# Patient Record
Sex: FEMALE | Race: WHITE | NOT HISPANIC OR LATINO | URBAN - METROPOLITAN AREA
[De-identification: names, ages, dates, MRNs, and addresses within clinical notes are randomized per-mention and may not be internally consistent; named-entity substitution may affect disease eponyms.]

---

## 2022-04-10 ENCOUNTER — HISTORICAL (OUTPATIENT)
Dept: ADMINISTRATIVE | Facility: HOSPITAL | Age: 52
End: 2022-04-10

## 2022-04-28 VITALS
DIASTOLIC BLOOD PRESSURE: 106 MMHG | WEIGHT: 158 LBS | SYSTOLIC BLOOD PRESSURE: 146 MMHG | BODY MASS INDEX: 29.83 KG/M2 | HEIGHT: 61 IN

## 2023-01-29 ENCOUNTER — HOSPITAL ENCOUNTER (INPATIENT)
Facility: HOSPITAL | Age: 53
LOS: 9 days | Discharge: HOME | DRG: 643 | End: 2023-02-07
Attending: SPECIALIST | Admitting: STUDENT IN AN ORGANIZED HEALTH CARE EDUCATION/TRAINING PROGRAM
Payer: MEDICAID

## 2023-01-29 DIAGNOSIS — I10 ACCELERATED HYPERTENSION: Primary | ICD-10-CM

## 2023-01-29 DIAGNOSIS — R07.9 CHEST PAIN: ICD-10-CM

## 2023-01-29 DIAGNOSIS — E03.9 HYPOTHYROIDISM, UNSPECIFIED TYPE: ICD-10-CM

## 2023-01-29 DIAGNOSIS — N28.9 ACUTE KIDNEY INSUFFICIENCY: ICD-10-CM

## 2023-01-29 DIAGNOSIS — L03.116 CELLULITIS OF LEFT FOOT: ICD-10-CM

## 2023-01-29 LAB
ALBUMIN SERPL-MCNC: 3.5 G/DL (ref 3.5–5)
ALBUMIN/GLOB SERPL: 0.8 RATIO (ref 1.1–2)
ALP SERPL-CCNC: 105 UNIT/L (ref 40–150)
ALT SERPL-CCNC: 36 UNIT/L (ref 0–55)
AST SERPL-CCNC: 31 UNIT/L (ref 5–34)
BASOPHILS # BLD AUTO: 0.03 X10(3)/MCL (ref 0–0.2)
BASOPHILS NFR BLD AUTO: 0.3 %
BILIRUBIN DIRECT+TOT PNL SERPL-MCNC: 0.5 MG/DL
BUN SERPL-MCNC: 24.7 MG/DL (ref 9.8–20.1)
CALCIUM SERPL-MCNC: 9.1 MG/DL (ref 8.4–10.2)
CHLORIDE SERPL-SCNC: 103 MMOL/L (ref 98–107)
CO2 SERPL-SCNC: 25 MMOL/L (ref 22–29)
CREAT SERPL-MCNC: 1.98 MG/DL (ref 0.55–1.02)
EOSINOPHIL # BLD AUTO: 0.45 X10(3)/MCL (ref 0–0.9)
EOSINOPHIL NFR BLD AUTO: 4.3 %
ERYTHROCYTE [DISTWIDTH] IN BLOOD BY AUTOMATED COUNT: 16.1 % (ref 11.5–17)
GFR SERPLBLD CREATININE-BSD FMLA CKD-EPI: 30 MLS/MIN/1.73/M2
GLOBULIN SER-MCNC: 4.2 GM/DL (ref 2.4–3.5)
GLUCOSE SERPL-MCNC: 131 MG/DL (ref 74–100)
HCT VFR BLD AUTO: 39.3 % (ref 37–47)
HGB BLD-MCNC: 11.8 GM/DL (ref 12–16)
IMM GRANULOCYTES # BLD AUTO: 0.09 X10(3)/MCL (ref 0–0.04)
IMM GRANULOCYTES NFR BLD AUTO: 0.9 %
LYMPHOCYTES # BLD AUTO: 2.18 X10(3)/MCL (ref 0.6–4.6)
LYMPHOCYTES NFR BLD AUTO: 21 %
MCH RBC QN AUTO: 25.8 PG
MCHC RBC AUTO-ENTMCNC: 30 MG/DL (ref 33–36)
MCV RBC AUTO: 86 FL (ref 80–94)
MONOCYTES # BLD AUTO: 0.59 X10(3)/MCL (ref 0.1–1.3)
MONOCYTES NFR BLD AUTO: 5.7 %
NEUTROPHILS # BLD AUTO: 7.06 X10(3)/MCL (ref 2.1–9.2)
NEUTROPHILS NFR BLD AUTO: 67.8 %
PLATELET # BLD AUTO: 324 X10(3)/MCL (ref 130–400)
PMV BLD AUTO: 9.6 FL (ref 7.4–10.4)
POTASSIUM SERPL-SCNC: 3.2 MMOL/L (ref 3.5–5.1)
PROT SERPL-MCNC: 7.7 GM/DL (ref 6.4–8.3)
RBC # BLD AUTO: 4.57 X10(6)/MCL (ref 4.2–5.4)
SODIUM SERPL-SCNC: 141 MMOL/L (ref 136–145)
T4 FREE SERPL-MCNC: <0.42 NG/DL (ref 0.7–1.48)
TSH SERPL-ACNC: 138.49 UIU/ML (ref 0.35–4.94)
WBC # SPEC AUTO: 10.4 X10(3)/MCL (ref 4.5–11.5)

## 2023-01-29 PROCEDURE — 25000003 PHARM REV CODE 250: Performed by: SPECIALIST

## 2023-01-29 PROCEDURE — 96375 TX/PRO/DX INJ NEW DRUG ADDON: CPT

## 2023-01-29 PROCEDURE — 63600175 PHARM REV CODE 636 W HCPCS: Performed by: SPECIALIST

## 2023-01-29 PROCEDURE — 82533 TOTAL CORTISOL: CPT | Performed by: SPECIALIST

## 2023-01-29 PROCEDURE — 96365 THER/PROPH/DIAG IV INF INIT: CPT

## 2023-01-29 PROCEDURE — 80053 COMPREHEN METABOLIC PANEL: CPT | Performed by: SPECIALIST

## 2023-01-29 PROCEDURE — 85025 COMPLETE CBC W/AUTO DIFF WBC: CPT | Performed by: SPECIALIST

## 2023-01-29 PROCEDURE — 84443 ASSAY THYROID STIM HORMONE: CPT | Performed by: SPECIALIST

## 2023-01-29 PROCEDURE — 84481 FREE ASSAY (FT-3): CPT | Performed by: SPECIALIST

## 2023-01-29 PROCEDURE — 12000002 HC ACUTE/MED SURGE SEMI-PRIVATE ROOM

## 2023-01-29 PROCEDURE — 96376 TX/PRO/DX INJ SAME DRUG ADON: CPT

## 2023-01-29 PROCEDURE — 84439 ASSAY OF FREE THYROXINE: CPT | Performed by: SPECIALIST

## 2023-01-29 PROCEDURE — G0378 HOSPITAL OBSERVATION PER HR: HCPCS

## 2023-01-29 PROCEDURE — 99285 EMERGENCY DEPT VISIT HI MDM: CPT | Mod: 25

## 2023-01-29 RX ORDER — ALPRAZOLAM 0.5 MG/1
0.5 TABLET ORAL
Status: COMPLETED | OUTPATIENT
Start: 2023-01-29 | End: 2023-01-29

## 2023-01-29 RX ORDER — CEFAZOLIN SODIUM 1 G/3ML
1 INJECTION, POWDER, FOR SOLUTION INTRAMUSCULAR; INTRAVENOUS
Status: DISCONTINUED | OUTPATIENT
Start: 2023-01-29 | End: 2023-01-29

## 2023-01-29 RX ORDER — CEFAZOLIN SODIUM 1 G/3ML
1 INJECTION, POWDER, FOR SOLUTION INTRAMUSCULAR; INTRAVENOUS
Status: DISCONTINUED | OUTPATIENT
Start: 2023-01-30 | End: 2023-01-31

## 2023-01-29 RX ORDER — HYDRALAZINE HYDROCHLORIDE 20 MG/ML
20 INJECTION INTRAMUSCULAR; INTRAVENOUS
Status: COMPLETED | OUTPATIENT
Start: 2023-01-29 | End: 2023-01-29

## 2023-01-29 RX ORDER — CLONIDINE HYDROCHLORIDE 0.1 MG/1
0.1 TABLET ORAL
Status: DISCONTINUED | OUTPATIENT
Start: 2023-01-30 | End: 2023-01-30

## 2023-01-29 RX ORDER — LISINOPRIL AND HYDROCHLOROTHIAZIDE 12.5; 2 MG/1; MG/1
1 TABLET ORAL DAILY
Status: DISCONTINUED | OUTPATIENT
Start: 2023-01-30 | End: 2023-01-30

## 2023-01-29 RX ORDER — TALC
6 POWDER (GRAM) TOPICAL NIGHTLY PRN
Status: DISCONTINUED | OUTPATIENT
Start: 2023-01-30 | End: 2023-01-30

## 2023-01-29 RX ORDER — SODIUM CHLORIDE 0.9 % (FLUSH) 0.9 %
2 SYRINGE (ML) INJECTION EVERY 6 HOURS PRN
Status: DISCONTINUED | OUTPATIENT
Start: 2023-01-30 | End: 2023-01-30

## 2023-01-29 RX ORDER — ONDANSETRON 4 MG/1
8 TABLET, ORALLY DISINTEGRATING ORAL EVERY 8 HOURS PRN
Status: DISCONTINUED | OUTPATIENT
Start: 2023-01-30 | End: 2023-01-30

## 2023-01-29 RX ORDER — FAMOTIDINE 20 MG/1
20 TABLET, FILM COATED ORAL DAILY
Status: DISCONTINUED | OUTPATIENT
Start: 2023-01-30 | End: 2023-02-07 | Stop reason: HOSPADM

## 2023-01-29 RX ORDER — ACETAMINOPHEN 325 MG/1
650 TABLET ORAL EVERY 8 HOURS PRN
Status: DISCONTINUED | OUTPATIENT
Start: 2023-01-30 | End: 2023-01-30

## 2023-01-29 RX ORDER — HYDRALAZINE HYDROCHLORIDE 20 MG/ML
10 INJECTION INTRAMUSCULAR; INTRAVENOUS
Status: COMPLETED | OUTPATIENT
Start: 2023-01-29 | End: 2023-01-29

## 2023-01-29 RX ORDER — CLONIDINE HYDROCHLORIDE 0.2 MG/1
0.2 TABLET ORAL
Status: COMPLETED | OUTPATIENT
Start: 2023-01-29 | End: 2023-01-29

## 2023-01-29 RX ADMIN — SODIUM CHLORIDE 1000 ML: 9 INJECTION, SOLUTION INTRAVENOUS at 11:01

## 2023-01-29 RX ADMIN — CLONIDINE HYDROCHLORIDE 0.2 MG: 0.2 TABLET ORAL at 09:01

## 2023-01-29 RX ADMIN — DEXTROSE MONOHYDRATE 1 G: 50 INJECTION, SOLUTION INTRAVENOUS at 08:01

## 2023-01-29 RX ADMIN — ALPRAZOLAM 0.5 MG: 0.5 TABLET ORAL at 10:01

## 2023-01-29 RX ADMIN — HYDRALAZINE HYDROCHLORIDE 10 MG: 20 INJECTION, SOLUTION INTRAMUSCULAR; INTRAVENOUS at 09:01

## 2023-01-29 RX ADMIN — HYDRALAZINE HYDROCHLORIDE 20 MG: 20 INJECTION, SOLUTION INTRAMUSCULAR; INTRAVENOUS at 08:01

## 2023-01-30 PROBLEM — I10 ESSENTIAL HYPERTENSION: Status: ACTIVE | Noted: 2023-01-30

## 2023-01-30 PROBLEM — E03.9 HYPOTHYROIDISM: Status: ACTIVE | Noted: 2023-01-30

## 2023-01-30 PROBLEM — N28.9 ACUTE KIDNEY INSUFFICIENCY: Status: ACTIVE | Noted: 2023-01-30

## 2023-01-30 PROBLEM — I63.9 STROKE: Status: ACTIVE | Noted: 2023-01-30

## 2023-01-30 PROBLEM — R41.82 AMS (ALTERED MENTAL STATUS): Status: ACTIVE | Noted: 2023-01-30

## 2023-01-30 PROBLEM — L03.116 CELLULITIS OF LEFT FOOT: Status: ACTIVE | Noted: 2023-01-30

## 2023-01-30 LAB
AMPHET UR QL SCN: POSITIVE
ANION GAP SERPL CALC-SCNC: 11 MEQ/L
BARBITURATE SCN PRESENT UR: NEGATIVE
BASOPHILS # BLD AUTO: 0.03 X10(3)/MCL (ref 0–0.2)
BASOPHILS NFR BLD AUTO: 0.3 %
BENZODIAZ UR QL SCN: NEGATIVE
BUN SERPL-MCNC: 21.5 MG/DL (ref 9.8–20.1)
CALCIUM SERPL-MCNC: 8.5 MG/DL (ref 8.4–10.2)
CANNABINOIDS UR QL SCN: NEGATIVE
CHLORIDE SERPL-SCNC: 106 MMOL/L (ref 98–107)
CO2 SERPL-SCNC: 25 MMOL/L (ref 22–29)
COCAINE UR QL SCN: NEGATIVE
CORTIS SERPL-SCNC: 13.9 UG/DL
CREAT SERPL-MCNC: 1.46 MG/DL (ref 0.55–1.02)
CREAT/UREA NIT SERPL: 15
EOSINOPHIL # BLD AUTO: 0.29 X10(3)/MCL (ref 0–0.9)
EOSINOPHIL NFR BLD AUTO: 2.6 %
ERYTHROCYTE [DISTWIDTH] IN BLOOD BY AUTOMATED COUNT: 16.3 % (ref 11.5–17)
FOLATE SERPL-MCNC: 5.2 NG/ML (ref 7–31.4)
GFR SERPLBLD CREATININE-BSD FMLA CKD-EPI: 43 MLS/MIN/1.73/M2
GLUCOSE SERPL-MCNC: 126 MG/DL (ref 74–100)
HCT VFR BLD AUTO: 36.6 % (ref 37–47)
HGB BLD-MCNC: 11.3 GM/DL (ref 12–16)
IMM GRANULOCYTES # BLD AUTO: 0.07 X10(3)/MCL (ref 0–0.04)
IMM GRANULOCYTES NFR BLD AUTO: 0.6 %
LYMPHOCYTES # BLD AUTO: 2.13 X10(3)/MCL (ref 0.6–4.6)
LYMPHOCYTES NFR BLD AUTO: 19.4 %
MAGNESIUM SERPL-MCNC: 1.8 MG/DL (ref 1.6–2.6)
MCH RBC QN AUTO: 26.3 PG
MCHC RBC AUTO-ENTMCNC: 30.9 MG/DL (ref 33–36)
MCV RBC AUTO: 85.1 FL (ref 80–94)
MONOCYTES # BLD AUTO: 0.59 X10(3)/MCL (ref 0.1–1.3)
MONOCYTES NFR BLD AUTO: 5.4 %
NEUTROPHILS # BLD AUTO: 7.88 X10(3)/MCL (ref 2.1–9.2)
NEUTROPHILS NFR BLD AUTO: 71.7 %
OPIATES UR QL SCN: NEGATIVE
PCP UR QL: NEGATIVE
PH UR: 7 [PH] (ref 3–11)
PLATELET # BLD AUTO: 315 X10(3)/MCL (ref 130–400)
PMV BLD AUTO: 9.9 FL (ref 7.4–10.4)
POTASSIUM SERPL-SCNC: 3.3 MMOL/L (ref 3.5–5.1)
RBC # BLD AUTO: 4.3 X10(6)/MCL (ref 4.2–5.4)
SODIUM SERPL-SCNC: 142 MMOL/L (ref 136–145)
SPECIFIC GRAVITY, URINE AUTO (.000) (OHS): 1.02 (ref 1–1.03)
T3FREE SERPL-MCNC: 1.35 PG/ML (ref 1.57–3.91)
VIT B12 SERPL-MCNC: 226 PG/ML (ref 213–816)
WBC # SPEC AUTO: 11 X10(3)/MCL (ref 4.5–11.5)

## 2023-01-30 PROCEDURE — 80048 BASIC METABOLIC PNL TOTAL CA: CPT | Performed by: STUDENT IN AN ORGANIZED HEALTH CARE EDUCATION/TRAINING PROGRAM

## 2023-01-30 PROCEDURE — 83735 ASSAY OF MAGNESIUM: CPT | Performed by: STUDENT IN AN ORGANIZED HEALTH CARE EDUCATION/TRAINING PROGRAM

## 2023-01-30 PROCEDURE — 25000003 PHARM REV CODE 250: Performed by: SPECIALIST

## 2023-01-30 PROCEDURE — 85025 COMPLETE CBC W/AUTO DIFF WBC: CPT | Performed by: STUDENT IN AN ORGANIZED HEALTH CARE EDUCATION/TRAINING PROGRAM

## 2023-01-30 PROCEDURE — 82607 VITAMIN B-12: CPT | Performed by: STUDENT IN AN ORGANIZED HEALTH CARE EDUCATION/TRAINING PROGRAM

## 2023-01-30 PROCEDURE — G0378 HOSPITAL OBSERVATION PER HR: HCPCS

## 2023-01-30 PROCEDURE — 87389 HIV-1 AG W/HIV-1&-2 AB AG IA: CPT | Performed by: STUDENT IN AN ORGANIZED HEALTH CARE EDUCATION/TRAINING PROGRAM

## 2023-01-30 PROCEDURE — 63600175 PHARM REV CODE 636 W HCPCS: Performed by: SPECIALIST

## 2023-01-30 PROCEDURE — 86780 TREPONEMA PALLIDUM: CPT | Performed by: STUDENT IN AN ORGANIZED HEALTH CARE EDUCATION/TRAINING PROGRAM

## 2023-01-30 PROCEDURE — 92523 SPEECH SOUND LANG COMPREHEN: CPT

## 2023-01-30 PROCEDURE — 82746 ASSAY OF FOLIC ACID SERUM: CPT | Performed by: STUDENT IN AN ORGANIZED HEALTH CARE EDUCATION/TRAINING PROGRAM

## 2023-01-30 PROCEDURE — 97162 PT EVAL MOD COMPLEX 30 MIN: CPT

## 2023-01-30 PROCEDURE — 63600175 PHARM REV CODE 636 W HCPCS: Performed by: STUDENT IN AN ORGANIZED HEALTH CARE EDUCATION/TRAINING PROGRAM

## 2023-01-30 PROCEDURE — 80307 DRUG TEST PRSMV CHEM ANLYZR: CPT | Performed by: STUDENT IN AN ORGANIZED HEALTH CARE EDUCATION/TRAINING PROGRAM

## 2023-01-30 PROCEDURE — 25000003 PHARM REV CODE 250: Performed by: STUDENT IN AN ORGANIZED HEALTH CARE EDUCATION/TRAINING PROGRAM

## 2023-01-30 PROCEDURE — 97166 OT EVAL MOD COMPLEX 45 MIN: CPT

## 2023-01-30 PROCEDURE — 11000001 HC ACUTE MED/SURG PRIVATE ROOM

## 2023-01-30 PROCEDURE — 96372 THER/PROPH/DIAG INJ SC/IM: CPT | Performed by: STUDENT IN AN ORGANIZED HEALTH CARE EDUCATION/TRAINING PROGRAM

## 2023-01-30 PROCEDURE — A4216 STERILE WATER/SALINE, 10 ML: HCPCS | Performed by: SPECIALIST

## 2023-01-30 PROCEDURE — A4216 STERILE WATER/SALINE, 10 ML: HCPCS | Performed by: STUDENT IN AN ORGANIZED HEALTH CARE EDUCATION/TRAINING PROGRAM

## 2023-01-30 PROCEDURE — 94760 N-INVAS EAR/PLS OXIMETRY 1: CPT

## 2023-01-30 RX ORDER — HYDROCHLOROTHIAZIDE 12.5 MG/1
12.5 TABLET ORAL DAILY
Status: DISCONTINUED | OUTPATIENT
Start: 2023-01-30 | End: 2023-02-01

## 2023-01-30 RX ORDER — ENOXAPARIN SODIUM 100 MG/ML
40 INJECTION SUBCUTANEOUS EVERY 24 HOURS
Status: DISCONTINUED | OUTPATIENT
Start: 2023-01-30 | End: 2023-02-07 | Stop reason: HOSPADM

## 2023-01-30 RX ORDER — ONDANSETRON 2 MG/ML
4 INJECTION INTRAMUSCULAR; INTRAVENOUS EVERY 8 HOURS PRN
Status: DISCONTINUED | OUTPATIENT
Start: 2023-01-30 | End: 2023-02-07 | Stop reason: HOSPADM

## 2023-01-30 RX ORDER — ATORVASTATIN CALCIUM 40 MG/1
40 TABLET, FILM COATED ORAL NIGHTLY
Status: DISCONTINUED | OUTPATIENT
Start: 2023-01-30 | End: 2023-02-07 | Stop reason: HOSPADM

## 2023-01-30 RX ORDER — MAGNESIUM SULFATE HEPTAHYDRATE 40 MG/ML
2 INJECTION, SOLUTION INTRAVENOUS ONCE
Status: COMPLETED | OUTPATIENT
Start: 2023-01-30 | End: 2023-01-30

## 2023-01-30 RX ORDER — NALOXONE HCL 0.4 MG/ML
0.02 VIAL (ML) INJECTION
Status: DISCONTINUED | OUTPATIENT
Start: 2023-01-30 | End: 2023-02-07 | Stop reason: HOSPADM

## 2023-01-30 RX ORDER — IPRATROPIUM BROMIDE AND ALBUTEROL SULFATE 2.5; .5 MG/3ML; MG/3ML
3 SOLUTION RESPIRATORY (INHALATION) EVERY 6 HOURS PRN
Status: DISCONTINUED | OUTPATIENT
Start: 2023-01-30 | End: 2023-02-07 | Stop reason: HOSPADM

## 2023-01-30 RX ORDER — POLYETHYLENE GLYCOL 3350 17 G/17G
17 POWDER, FOR SOLUTION ORAL 3 TIMES DAILY PRN
Status: DISCONTINUED | OUTPATIENT
Start: 2023-01-30 | End: 2023-02-07 | Stop reason: HOSPADM

## 2023-01-30 RX ORDER — LABETALOL HCL 20 MG/4 ML
10 SYRINGE (ML) INTRAVENOUS 4 TIMES DAILY PRN
Status: DISCONTINUED | OUTPATIENT
Start: 2023-01-30 | End: 2023-02-07 | Stop reason: HOSPADM

## 2023-01-30 RX ORDER — SIMETHICONE 80 MG
1 TABLET,CHEWABLE ORAL 4 TIMES DAILY PRN
Status: DISCONTINUED | OUTPATIENT
Start: 2023-01-30 | End: 2023-02-07 | Stop reason: HOSPADM

## 2023-01-30 RX ORDER — TALC
9 POWDER (GRAM) TOPICAL NIGHTLY PRN
Status: DISCONTINUED | OUTPATIENT
Start: 2023-01-30 | End: 2023-02-07 | Stop reason: HOSPADM

## 2023-01-30 RX ORDER — SODIUM CHLORIDE 0.9 % (FLUSH) 0.9 %
10 SYRINGE (ML) INJECTION
Status: DISCONTINUED | OUTPATIENT
Start: 2023-01-30 | End: 2023-02-07 | Stop reason: HOSPADM

## 2023-01-30 RX ORDER — ONDANSETRON 4 MG/1
8 TABLET, ORALLY DISINTEGRATING ORAL EVERY 8 HOURS PRN
Status: DISCONTINUED | OUTPATIENT
Start: 2023-01-30 | End: 2023-02-07 | Stop reason: HOSPADM

## 2023-01-30 RX ORDER — ACETAMINOPHEN 325 MG/1
650 TABLET ORAL EVERY 4 HOURS PRN
Status: DISCONTINUED | OUTPATIENT
Start: 2023-01-30 | End: 2023-02-07 | Stop reason: HOSPADM

## 2023-01-30 RX ORDER — MAG HYDROX/ALUMINUM HYD/SIMETH 200-200-20
30 SUSPENSION, ORAL (FINAL DOSE FORM) ORAL 4 TIMES DAILY PRN
Status: DISCONTINUED | OUTPATIENT
Start: 2023-01-30 | End: 2023-02-07 | Stop reason: HOSPADM

## 2023-01-30 RX ORDER — HYDROCODONE BITARTRATE AND ACETAMINOPHEN 5; 325 MG/1; MG/1
1 TABLET ORAL EVERY 6 HOURS PRN
Status: DISCONTINUED | OUTPATIENT
Start: 2023-01-30 | End: 2023-02-07 | Stop reason: HOSPADM

## 2023-01-30 RX ORDER — IBUPROFEN 200 MG
16 TABLET ORAL
Status: DISCONTINUED | OUTPATIENT
Start: 2023-01-30 | End: 2023-02-07 | Stop reason: HOSPADM

## 2023-01-30 RX ORDER — BISACODYL 10 MG
10 SUPPOSITORY, RECTAL RECTAL DAILY PRN
Status: DISCONTINUED | OUTPATIENT
Start: 2023-01-30 | End: 2023-02-07 | Stop reason: HOSPADM

## 2023-01-30 RX ORDER — MORPHINE SULFATE 4 MG/ML
2 INJECTION, SOLUTION INTRAMUSCULAR; INTRAVENOUS EVERY 6 HOURS PRN
Status: DISCONTINUED | OUTPATIENT
Start: 2023-01-30 | End: 2023-02-07 | Stop reason: HOSPADM

## 2023-01-30 RX ORDER — IBUPROFEN 200 MG
24 TABLET ORAL
Status: DISCONTINUED | OUTPATIENT
Start: 2023-01-30 | End: 2023-02-07 | Stop reason: HOSPADM

## 2023-01-30 RX ORDER — LISINOPRIL 10 MG/1
20 TABLET ORAL DAILY
Status: DISCONTINUED | OUTPATIENT
Start: 2023-01-30 | End: 2023-02-07 | Stop reason: HOSPADM

## 2023-01-30 RX ORDER — GLUCAGON 1 MG
1 KIT INJECTION
Status: DISCONTINUED | OUTPATIENT
Start: 2023-01-30 | End: 2023-02-07 | Stop reason: HOSPADM

## 2023-01-30 RX ORDER — ASPIRIN 81 MG/1
81 TABLET ORAL DAILY
Status: DISCONTINUED | OUTPATIENT
Start: 2023-01-31 | End: 2023-02-07 | Stop reason: HOSPADM

## 2023-01-30 RX ADMIN — CEFAZOLIN 1 G: 330 INJECTION, POWDER, FOR SOLUTION INTRAMUSCULAR; INTRAVENOUS at 03:01

## 2023-01-30 RX ADMIN — MAGNESIUM SULFATE HEPTAHYDRATE 2 G: 40 INJECTION, SOLUTION INTRAVENOUS at 11:01

## 2023-01-30 RX ADMIN — ENOXAPARIN SODIUM 40 MG: 40 INJECTION SUBCUTANEOUS at 04:01

## 2023-01-30 RX ADMIN — ATORVASTATIN CALCIUM 40 MG: 40 TABLET, FILM COATED ORAL at 08:01

## 2023-01-30 RX ADMIN — CEFAZOLIN 1 G: 330 INJECTION, POWDER, FOR SOLUTION INTRAMUSCULAR; INTRAVENOUS at 06:01

## 2023-01-30 RX ADMIN — CEFAZOLIN 1 G: 330 INJECTION, POWDER, FOR SOLUTION INTRAMUSCULAR; INTRAVENOUS at 10:01

## 2023-01-30 RX ADMIN — FAMOTIDINE 20 MG: 20 TABLET, FILM COATED ORAL at 08:01

## 2023-01-30 RX ADMIN — Medication 2 ML: at 06:01

## 2023-01-30 RX ADMIN — LABETALOL HYDROCHLORIDE 10 MG: 5 INJECTION, SOLUTION INTRAVENOUS at 08:01

## 2023-01-30 RX ADMIN — LISINOPRIL 20 MG: 10 TABLET ORAL at 08:01

## 2023-01-30 RX ADMIN — LEVOTHYROXINE SODIUM 150 MCG: 0.1 TABLET ORAL at 06:01

## 2023-01-30 RX ADMIN — HYDROCHLOROTHIAZIDE 12.5 MG: 12.5 TABLET ORAL at 08:01

## 2023-01-30 NOTE — ASSESSMENT & PLAN NOTE
-metabolic encephalopathy secondary to profound hypothyroidism   -record review reveals patient has had an inpatient psychiatric admission previously however no notes available  -pending HIV, syphilis, B12, folate

## 2023-01-30 NOTE — ASSESSMENT & PLAN NOTE
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

## 2023-01-30 NOTE — PROGRESS NOTES
Nursing and PT tried to wake pt up, but she continued to fall asleep during eating and conversation. Unable to arouse for participation. PT to return in AM

## 2023-01-30 NOTE — ASSESSMENT & PLAN NOTE
-continue with lisinopril, hydrochlorothiazide  -patient had permissive hypertension for 24 hours, okay to control now

## 2023-01-30 NOTE — PLAN OF CARE
Problem: Adult Inpatient Plan of Care  Goal: Plan of Care Review  Outcome: Ongoing, Progressing  Goal: Patient-Specific Goal (Individualized)  Outcome: Ongoing, Progressing  Flowsheets (Taken 1/30/2023 1658)  Anxieties, Fears or Concerns: being in the hospital  Individualized Care Needs: controlling blood pressure  Goal: Absence of Hospital-Acquired Illness or Injury  Outcome: Ongoing, Progressing  Intervention: Identify and Manage Fall Risk  Flowsheets (Taken 1/30/2023 1658)  Safety Promotion/Fall Prevention:   assistive device/personal item within reach   bed alarm set   nonskid shoes/socks when out of bed   side rails raised x 2  Intervention: Prevent Skin Injury  Flowsheets (Taken 1/30/2023 1658)  Body Position: supine  Skin Protection:   adhesive use limited   incontinence pads utilized   tubing/devices free from skin contact  Intervention: Prevent and Manage VTE (Venous Thromboembolism) Risk  Flowsheets (Taken 1/30/2023 1658)  Activity Management: Rolling - L1  VTE Prevention/Management:   bleeding risk assessed   fluids promoted  Goal: Optimal Comfort and Wellbeing  Outcome: Ongoing, Progressing  Goal: Readiness for Transition of Care  Outcome: Ongoing, Progressing     Problem: Skin Injury Risk Increased  Goal: Skin Health and Integrity  Outcome: Ongoing, Progressing     Problem: Fall Injury Risk  Goal: Absence of Fall and Fall-Related Injury  Outcome: Ongoing, Progressing  Intervention: Identify and Manage Contributors  Flowsheets (Taken 1/30/2023 1658)  Medication Review/Management: medications reviewed     Problem: Infection  Goal: Absence of Infection Signs and Symptoms  Outcome: Ongoing, Progressing  Intervention: Prevent or Manage Infection  Flowsheets (Taken 1/30/2023 1658)  Isolation Precautions: protective     Problem: Fluid and Electrolyte Imbalance (Acute Kidney Injury/Impairment)  Goal: Fluid and Electrolyte Balance  Outcome: Ongoing, Progressing  Intervention: Monitor and Manage Fluid and  Electrolyte Balance  Flowsheets (Taken 1/30/2023 1658)  Fluid/Electrolyte Management: fluids provided     Problem: Oral Intake Inadequate (Acute Kidney Injury/Impairment)  Goal: Optimal Nutrition Intake  Outcome: Ongoing, Progressing  Intervention: Promote and Optimize Nutrition  Flowsheets (Taken 1/30/2023 1658)  Oral Nutrition Promotion: safe use of adaptive equipment encouraged     Problem: Renal Function Impairment (Acute Kidney Injury/Impairment)  Goal: Effective Renal Function  Outcome: Ongoing, Progressing  Intervention: Monitor and Support Renal Function  Flowsheets (Taken 1/30/2023 1658)  Medication Review/Management: medications reviewed

## 2023-01-30 NOTE — PLAN OF CARE
Problem: Occupational Therapy  Goal: Occupational Therapy Goal  Description: Goals to be met by: 2/6/23     Patient will increase functional independence with ADLs by performing:    LE Dressing with Minimal Assistance.  Grooming while standing at sink with Stand-by Assistance.  Toileting from toilet with Minimal Assistance for hygiene and clothing management.   Bathing from  shower chair/bench with Minimal Assistance.  Toilet transfer to toilet with Contact Guard Assistance.  Increased functional strength to 5/5 for ADL.    Outcome: Ongoing, Progressing

## 2023-01-30 NOTE — SUBJECTIVE & OBJECTIVE
History reviewed. No pertinent past medical history.    History reviewed. No pertinent surgical history.    Review of patient's allergies indicates:  No Known Allergies    No current facility-administered medications on file prior to encounter.     No current outpatient medications on file prior to encounter.     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   Objective:     Vital Signs (Most Recent):  Temp: 97.7 °F (36.5 °C) (01/30/23 1138)  Pulse: 79 (01/30/23 1138)  Resp: 18 (01/30/23 0327)  BP: (!) 163/83 (01/30/23 1138)  SpO2: 98 % (01/30/23 1138)   Vital Signs (24h Range):  Temp:  [96.2 °F (35.7 °C)-98.3 °F (36.8 °C)] 97.7 °F (36.5 °C)  Pulse:  [69-92] 79  Resp:  [18-29] 18  SpO2:  [96 %-98 %] 98 %  BP: (103-265)/() 163/83     Weight: 85.5 kg (188 lb 7.9 oz)  Body mass index is 30.42 kg/m².    Physical Exam  Constitutional:       General: She is not in acute distress.     Appearance: Normal appearance. She is obese.   HENT:      Mouth/Throat:      Mouth: Mucous membranes are moist.      Pharynx: Oropharynx is clear. No oropharyngeal exudate or posterior oropharyngeal erythema.   Eyes:      Extraocular Movements: Extraocular movements intact.      Conjunctiva/sclera: Conjunctivae normal.      Pupils: Pupils are equal, round, and reactive to light.   Neck:      Thyroid: No thyromegaly.   Cardiovascular:      Rate and Rhythm: Normal rate and regular rhythm.      Heart sounds: No murmur heard.    No friction rub. No gallop.   Pulmonary:      Breath sounds: Normal breath sounds.   Abdominal:      General: Bowel sounds are normal. There is no distension.      Palpations: Abdomen is soft.      Tenderness: There is no abdominal tenderness.   Lymphadenopathy:       Cervical: No cervical adenopathy.   Skin:     General: Skin is warm.      Findings: No rash.   Neurological:      General: No focal deficit present.      Mental Status: She is oriented to person, place, and time.      Cranial Nerves: No cranial nerve deficit.   Psychiatric:         Mood and Affect: Mood is not anxious or depressed. Affect is not inappropriate.         Speech: She is noncommunicative. Speech is delayed.         Behavior: Behavior is slowed.      Comments: Pseudobulbar affect         CRANIAL NERVES     CN III, IV, VI   Pupils are equal, round, and reactive to light.     Significant Labs: All pertinent labs within the past 24 hours have been reviewed.    Significant Imaging: I have reviewed all pertinent imaging results/findings within the past 24 hours.

## 2023-01-30 NOTE — ED PROVIDER NOTES
Encounter Date: 1/29/2023       History     Chief Complaint   Patient presents with    Foot Swelling     Presents with redness, swelling and pain to L foot that is worsening over the last few months.       Patient presents complaining of redness, swelling and pain to her left foot that has worsened over the last few  months; patient is here with her boyfriend who states she has not been taking her medication for years; patient's blood pressure with significant elevation; she denies chest pain or shortness of breath but her boyfriend notes that she is been acting differently lately with sluggishness and sometimes seems confused    The history is provided by the patient and a friend.   Review of patient's allergies indicates:  No Known Allergies  History reviewed. No pertinent past medical history.  History reviewed. No pertinent surgical history.  History reviewed. No pertinent family history.     Review of Systems   Constitutional:  Positive for fatigue.   HENT: Negative.     Respiratory: Negative.     Cardiovascular: Negative.    Gastrointestinal: Negative.    Musculoskeletal: Negative.    Skin: Negative.    Neurological: Negative.    All other systems reviewed and are negative.    Physical Exam     Initial Vitals [01/29/23 1955]   BP Pulse Resp Temp SpO2   (!) 214/142 89 20 98.3 °F (36.8 °C) 96 %      MAP       --         Physical Exam    Nursing note and vitals reviewed.  Constitutional:   Disheveled, well-developed; patient is oriented to person and place but has trouble stating the date or year and is slow to answer questions   HENT:   Head: Normocephalic and atraumatic.   Eyes: EOM are normal. Pupils are equal, round, and reactive to light.   Neck: Neck supple.   Normal range of motion.  Cardiovascular:  Normal rate, regular rhythm, normal heart sounds and intact distal pulses.           Pulmonary/Chest: Breath sounds normal.   Abdominal: Abdomen is soft. She exhibits no distension.   Protuberant    Musculoskeletal:         General: Normal range of motion.      Cervical back: Normal range of motion and neck supple.      Comments: Swelling and redness to the dorsum of the right foot     Neurological: She is alert. She has normal strength. No cranial nerve deficit. GCS score is 15. GCS eye subscore is 4. GCS verbal subscore is 5. GCS motor subscore is 6.   Skin: Skin is warm and dry.       ED Course   Procedures  Labs Reviewed   COMPREHENSIVE METABOLIC PANEL - Abnormal; Notable for the following components:       Result Value    Potassium Level 3.2 (*)     Glucose Level 131 (*)     Blood Urea Nitrogen 24.7 (*)     Creatinine 1.98 (*)     Globulin 4.2 (*)     Albumin/Globulin Ratio 0.8 (*)     All other components within normal limits   TSH - Abnormal; Notable for the following components:    Thyroid Stimulating Hormone 138.490 (*)     All other components within normal limits   CBC WITH DIFFERENTIAL - Abnormal; Notable for the following components:    Hgb 11.8 (*)     MCHC 30.0 (*)     IG# 0.09 (*)     All other components within normal limits   T4, FREE - Abnormal; Notable for the following components:    Thyroxine Free <0.42 (*)     All other components within normal limits   CBC W/ AUTO DIFFERENTIAL    Narrative:     The following orders were created for panel order CBC auto differential.  Procedure                               Abnormality         Status                     ---------                               -----------         ------                     CBC with Differential[[id number]]        Abnormal            Final result                 Please view results for these tests on the individual orders.   T3,FREE ([facility] ONLY)   CORTISOL, RANDOM          Imaging Results              CT Head Without Contrast (Preliminary result)  Result time 01/29/23 23:54:32      Preliminary result by Ortiz Vazquez MD (01/29/23 23:54:32)                   Narrative:    START OF REPORT:  Technique: CT of the head was  performed without intravenous contrast with axial as well as coronal and sagittal images.    Comparison: None.    Dosage Information: Automated Exposure Control was utilized 1562.06 mGy.cm.    Clinical history: Altered mental status-swelling and pain to foot.    Findings:  Hemorrhage: No acute intracranial hemorrhage is seen.  CSF spaces: The ventricles, sulci and basal cisterns all appear somewhat prominent suggesting an element of global cerebral atrophy.  Brain parenchyma: Pronounced microvascular change is seen in portions of the periventricular and deep white matter tracts.  Vascular territory infarcts:  Lacunar infarcts: There is a â5.7 mmâ lacunar infarct seen on âImage 24, Series 601â in the right. Corona radiata and adjacent capsuloganglionic region.  Cerebellum: Unremarkable.  Sella and skull base: The sella appears to be within normal limits for age.  Intracranial calcifications: Incidental note is made of bilateral choroid plexus calcification. Incidental note is made of some pineal region calcification.  Calvarium: No acute linear or depressed skull fracture is seen.    Maxillofacial Structures:  Paranasal sinuses: The visualized paranasal sinuses appear clear with no significant mucoperiosteal thickening or air fluid levels identified.  Orbits: The orbits appear unremarkable.  Zygomatic arches: The zygomatic arches are intact and unremarkable.  Temporal bones and mastoids: The temporal bones and mastoids appear unremarkable.  TMJ: The mandibular condyles appear normally placed with respect to the mandibular fossa.      Impression:  1. Pronounced microvascular change is seen in portions of the periventricular and deep white matter tracts. Correlate clinically as regards additional evaluation and follow-up possibly with MRI.  2. No acute intracranial process identified. Details and other findings as noted above.                          Preliminary result by Interface, Rad Results In (01/29/23  23:54:32)                   Narrative:    START OF REPORT:  Technique: CT of the head was performed without intravenous contrast with axial as well as coronal and sagittal images.    Comparison: None.    Dosage Information: Automated Exposure Control was utilized 1562.06 mGy.cm.    Clinical history: Altered mental status-swelling and pain to foot.    Findings:  Hemorrhage: No acute intracranial hemorrhage is seen.  CSF spaces: The ventricles, sulci and basal cisterns all appear somewhat prominent suggesting an element of global cerebral atrophy.  Brain parenchyma: Pronounced microvascular change is seen in portions of the periventricular and deep white matter tracts.  Vascular territory infarcts:  Lacunar infarcts: There is a â5.7 mmâ lacunar infarct seen on âImage 24, Series 601â in the right. Corona radiata and adjacent capsuloganglionic region.  Cerebellum: Unremarkable.  Sella and skull base: The sella appears to be within normal limits for age.  Intracranial calcifications: Incidental note is made of bilateral choroid plexus calcification. Incidental note is made of some pineal region calcification.  Calvarium: No acute linear or depressed skull fracture is seen.    Maxillofacial Structures:  Paranasal sinuses: The visualized paranasal sinuses appear clear with no significant mucoperiosteal thickening or air fluid levels identified.  Orbits: The orbits appear unremarkable.  Zygomatic arches: The zygomatic arches are intact and unremarkable.  Temporal bones and mastoids: The temporal bones and mastoids appear unremarkable.  TMJ: The mandibular condyles appear normally placed with respect to the mandibular fossa.      Impression:  1. Pronounced microvascular change is seen in portions of the periventricular and deep white matter tracts. Correlate clinically as regards additional evaluation and follow-up possibly with MRI.  2. No acute intracranial process identified. Details and other findings as noted  above.                                         Medications   sodium chloride 0.9% bolus 1,000 mL 1,000 mL (1,000 mLs Intravenous New Bag 1/29/23 2349)   sodium chloride 0.9% flush 2 mL (has no administration in time range)   melatonin tablet 6 mg (has no administration in time range)   acetaminophen tablet 650 mg (has no administration in time range)   famotidine tablet 20 mg (has no administration in time range)   ondansetron disintegrating tablet 8 mg (has no administration in time range)   ceFAZolin injection 1 g (has no administration in time range)   levothyroxine tablet 150 mcg (has no administration in time range)   cloNIDine tablet 0.1 mg (has no administration in time range)   lisinopriL tablet 20 mg (has no administration in time range)     And   hydroCHLOROthiazide tablet 12.5 mg (has no administration in time range)   hydrALAZINE injection 20 mg (20 mg Intravenous Given 1/29/23 2046)   ceFAZolin (ANCEF) 1 g in dextrose 5 % in water (D5W) 5 % 50 mL IVPB (MB+) (0 g Intravenous Stopped 1/29/23 2134)   hydrALAZINE injection 10 mg (10 mg Intravenous Given 1/29/23 2139)   cloNIDine tablet 0.2 mg (0.2 mg Oral Given 1/29/23 2117)   ALPRAZolam tablet 0.5 mg (0.5 mg Oral Given 1/29/23 2238)     Medical Decision Making:   Initial Assessment:    Patient appears unkempt in somewhat disheveled answering questions slowly; she has a very high blood pressure; she is been noncompliant with her medicatio  Differential Diagnosis:   Anemia; Viral illness; Dehydration; Electrolyte abnormality, cellulitis of foot, hypothyroidism, hyperthyroidism   Clinical Tests:   Lab Tests: Ordered and Reviewed  Radiological Study: Ordered and Reviewed  ED Management:   Patient was given hydralazine 20 mg IV for her blood pressure and Ancef 1 g for the cellulitis in her  right foot; patient had a TSH which was above 100 and I added a T4 level as well as cortisol; also ordered CT of the head due to the confusion; CT of the head revealed  "pronounce microvascular changes and will order MRI for the morning; patient will be started on Synthroid for her hypothyroidism and possibly early signs of myxedema               Patient Vitals for the past 24 hrs:   BP Temp Temp src Pulse Resp SpO2 Height Weight   01/30/23 0018 (!) 173/106 97.7 °F (36.5 °C) Oral 88 18 97 % 5' 6" (1.676 m) 85.5 kg (188 lb 7.9 oz)   01/29/23 2300 (!) 164/74 -- -- 85 -- 96 % -- --   01/29/23 2142 (!) 184/108 -- -- 92 20 97 % -- --   01/29/23 2110 (!) 175/141 -- -- 90 -- -- -- --   01/29/23 2057 (!) 185/145 -- -- 81 (!) 27 -- -- --   01/29/23 2018 (!) 265/154 -- -- 90 -- 97 % -- --   01/29/23 2016 -- -- -- 81 (!) 29 -- -- --   01/29/23 1955 (!) 214/142 98.3 °F (36.8 °C) Oral 89 20 96 % 5' 6" (1.676 m) 68 kg (150 lb)    Discussed admission with Dr. Reyes           Clinical Impression:   Final diagnoses:  [L03.116] Cellulitis of left foot  [E03.9] Hypothyroidism, unspecified type  [N28.9] Acute kidney insufficiency  [I10] Accelerated hypertension (Primary)        ED Disposition Condition    Observation Stable                Kang Martin MD  01/30/23 0109    "

## 2023-01-30 NOTE — ASSESSMENT & PLAN NOTE
-elevated TSH, low T4   -started on levothyroxine 150 mcg   -repeat TSH tomorrow, Q 2 days for 4 days and then subsequently would need repeat TSH in 6 weeks

## 2023-01-30 NOTE — PT/OT/SLP EVAL
Speech Language Pathology Evaluation  Cognitive Communication    Patient Name:  Alysia Washington   MRN:  [id number]  Admitting Diagnosis: Hypothyroidism    Recommendations:     Recommendations:                General Recommendations:  Cognitive-linguistic therapy  Diet recommendations:   , Thin liquids - IDDSI Level 0   Aspiration Precautions: Feed only when awake/alert and HOB to 90 degrees   General Precautions: Standard, fall  Communication strategies:  none    History:     History reviewed. No pertinent past medical history.    History reviewed. No pertinent surgical history.    Social History: Patient lives with boy friend.    Prior Intubation HX:      Modified Barium Swallow:     Chest X-Rays: no recent CXR on file    Prior diet: regular/thin    Occupation/hobbies/homemaking: pt denies driving or working. Pt reports she sometimes cooks and does manage her own medications.    Subjective     Pt sitting up in bed following PT and OT evals. Pt awake, though w/ flat affect and slow processing and initiation. Pt did not answer to first few Qs SLP presented.    Patient goals: none stated     Pain/Comfort:       Respiratory Status: Room air    Objective:   Cognitive Status:    Arousal/Alertness Delayed responses    Attention Sustained attention deficit    Orientation Person, Place, and Situation  Memory Immediate Recall WFL, Delayedpt I'ly recalled 2/3 words following a 3 minute filled delay, and long term recall 50% acc  Problem Solving Sequencing impaired, Solutions 75% acc, and Compare/contrast impaired       Receptive Language:   Comprehension:      Questions Complex yes/no 100% acc  Commands  two step basic commands 100% acc  multistep basic commands 0% acc    Pragmatics:    Abnormal affect flat, monotone  , initiation decreased, and Eye contact decreased    Expressive Language:  Verbal:    WH Qs: 100% acc  Automatic Speech  Phrase completion 100% acc        Motor Speech:  WFL    Voice:   WFL    Visual-Spatial:  Did  not assess    Reading:   Did not assess      Written Expression:   Did not assess    Pt falling asleep towards end of eval and unable to answer additional Qs or continue eval despite frequent verbal cueing provided.  Unable to perform bedside swallow eval this date due to pt falling fast asleep during cognitive linguistic eval.Of note, Pt was observed drinking coca cola via straw across eval w/o overt s/s of aspiration.     Treatment: skilled SLP services are warranted to target cognitive linguistic deficits noted.     Assessment:   Alysia Washington is a 52 y.o. female with an SLP diagnosis of Cognitive-Linguistic Impairment.  She presents with impaired long term memory, orientation, multi step command following, sequencing, initiation, and problem solving. Pt would benefit from skilled SLP services to target above stated deficits and promote a return to PLOF.    Goals:   Multidisciplinary Problems       SLP Goals          Problem: SLP    Goal Priority Disciplines Outcome   SLP Goal     SLP Ongoing, Progressing   Description: LTG: Pt will improve cognitive linguistic skills to SBA level to ensure safety upon discharge home.    STG:  Pt will orient x4 [place].  Pt will attend to structured task for 3 minutes given 3 or less redirections/cues.  Pt will follow 3-step commands w/ 80% acc modA.  Pt will provide solutions to common situations w/ 90% acc [place].  Pt will complete 3-4 step sequencing tasks w/ 80% acc modA.                       Plan:   Patient to be seen:   (PRN)   Plan of Care expires:  02/10/23  Plan of Care reviewed with:   (unable as pt fell asleep towards end of eval.)   SLP Follow-Up:  Yes    Pt would benefit from continuing therapy via acute rehab facility.      Discharge recommendations:      Barriers to Discharge:  Level of Skilled Assistance Needed see PT/Ot notes and Safety Awareness impaired    Time Tracking:   SLP Treatment Date:      Speech Start Time:  0935  Speech Stop Time:  1000     Speech Total  Time (min):  25 min    Billable Minutes: Eval 25 speech/language/cognition     Taty Bella M.S., CCC-SLP   01/30/2023

## 2023-01-30 NOTE — PT/OT/SLP EVAL
"Physical Therapy Acute Care Evaluation    Patient Name:  Alysia Washington   MRN:  [id number]    History:     History reviewed. No pertinent past medical history.    History reviewed. No pertinent surgical history.      Subjective     Chief Complaint: Fatigue, "fat" left foot  Patient/Family Comments/goals: Go home  Pain/Comfort:         Living Environment:  Lives with: Alone  Home Environment: No steps to enter, SSH. Tub shower combo.   Previous level of function: MOD I  Equipment used at home: none.  DME owned (not currently used): single point cane.        Objective:     Communicated with Nursing and OT prior to session.  Patient found HOB elevated with PureWick  upon PT entry to room.    General Precautions: Standard,     Orthopedic Precautions:    Braces:    Respiratory Status: Room air     Vitals Value    Room air      Oxygen (L)     Blood pressure     Heart rate         Exams:  Cognitive Exam:  Patient is oriented to Person  RLE Strength: WFL  LLE Strength: Deficits: 3+/5 globally     Functional Mobility:  Bed Mobility:     Rolling Left:  minimum assistance  Supine to Sit: moderate assistance  Sit to Supine: minimum assistance  Transfers:     Sit to Stand:  minimum assistance with rolling walker  Bed to Chair: minimum assistance with  rolling walker  using  Stand Pivot  Gait: x 15 feet using RW at MIN A. Poor coordination and response times demonstrated needing max cues for completion. Pt needed re-direction for attention to task.       Additional information: Nursing asked about L calf size, with reports it is dx'd as cellulitis. No pain was reported from pt, but that it just feels "fat".     Patient left HOB elevated with call button in reach and bed alarm on.      Assessment:     Alysia Washington is a 52 y.o. female admitted with a medical diagnosis of Hypothyroidism.  She presents with the following impairments/functional limitations:  weakness, impaired endurance, decreased safety awareness, impaired " coordination, impaired cognition, impaired functional mobility, edema .    Rehab Prognosis: Good; patient would benefit from acute skilled PT services to address these deficits and reach maximum level of function.    Recent Surgery: * No surgery found *        Recommendations:     Discharge Recommendations:  rehabilitation facility   Discharge Equipment Recommendations: walker, rolling       Plan:     During this hospitalization, patient to be seen 6 x/week to address the identified rehab impairments via gait training, therapeutic activities, therapeutic exercises and progress toward the following goals:    GOALS:   Multidisciplinary Problems       Physical Therapy Goals          Problem: Physical Therapy    Goal Priority Disciplines Outcome Goal Variances Interventions   Physical Therapy Goal     PT, PT/OT Ongoing, Progressing     Description: Goals to be met by: Discharge     Patient will increase functional independence with mobility by performin. Supine to sit with Modified [place] and Sit to supine with Modified [place]  2. Sit to stand transfer with Modified [place]  3. Bed to chair transfer with Modified [place] using Rolling Walker  4. Gait  x 175 feet with Modified [place] using Rolling Walker.   5. Increased functional strength to 5/5 for LLE.                         Time Tracking:       PT Start Time: 0900     PT Stop Time: 0930  PT Total Time (min): 30 min     Billable Minutes: Evaluation Moderate complexity       2023

## 2023-01-30 NOTE — H&P
[facility]  [facility] Medicine  History & Physical    Patient Name: Alysia Washington  MRN: [id number]  Patient Class: OP- Observation  Admission Date: 1/29/2023  Attending Physician: Thairy G Reyes, DO   Primary Care Provider: No primary care provider on file.         Patient information was obtained from past medical records and ER records.     Subjective:     Principal Problem:Hypothyroidism    Chief Complaint:   Chief Complaint   Patient presents with    Foot Swelling     Presents with redness, swelling and pain to L foot that is worsening over the last few months.         HPI: Pt exhibiting selective mutism. No records available thus history obtained from record review. Pt presented to the ED complaining of redness, swelling and pain to her left foot that has worsened over the last few months. Boyfriend stated she has not been taking her medication for years and notes that she is been acting differently lately with sluggishness and sometimes seems confused.  In the ED patient with hypertensive urgency as high as 265/154, and profound hypothyroidism with a TSH of 138, T4 less than 0.42 and T3 1.35.  Patient with no concomitant hemodynamic instability or hyponatremia.  Low suspicion for myxedema coma given cortisol level. CT head found pronounced microvascular change and MRI showed left parietal lobe acute to subacute nonhemorrhagic infarct and old lacunar infarcts and severe microangiopathic edema.      History reviewed. No pertinent past medical history.    History reviewed. No pertinent surgical history.    Review of patient's allergies indicates:  No Known Allergies    No current facility-administered medications on file prior to encounter.     No current outpatient medications on file prior to encounter.     Family History    None       Tobacco Use    Smoking status: Not on file    Smokeless tobacco: Not on file   Substance and Sexual Activity    Alcohol use: Not on file     Drug use: Not on file    Sexual activity: Not on file     Review of Systems   Reason unable to perform ROS: selective mutism.   Objective:     Vital Signs (Most Recent):  Temp: 97.7 °F (36.5 °C) (01/30/23 1138)  Pulse: 79 (01/30/23 1138)  Resp: 18 (01/30/23 0327)  BP: (!) 163/83 (01/30/23 1138)  SpO2: 98 % (01/30/23 1138)   Vital Signs (24h Range):  Temp:  [96.2 °F (35.7 °C)-98.3 °F (36.8 °C)] 97.7 °F (36.5 °C)  Pulse:  [69-92] 79  Resp:  [18-29] 18  SpO2:  [96 %-98 %] 98 %  BP: (103-265)/() 163/83     Weight: 85.5 kg (188 lb 7.9 oz)  Body mass index is 30.42 kg/m².    Physical Exam  Constitutional:       General: She is not in acute distress.     Appearance: Normal appearance. She is obese.   HENT:      Mouth/Throat:      Mouth: Mucous membranes are moist.      Pharynx: Oropharynx is clear. No oropharyngeal exudate or posterior oropharyngeal erythema.   Eyes:      Extraocular Movements: Extraocular movements intact.      Conjunctiva/sclera: Conjunctivae normal.      Pupils: Pupils are equal, round, and reactive to light.   Neck:      Thyroid: No thyromegaly.   Cardiovascular:      Rate and Rhythm: Normal rate and regular rhythm.      Heart sounds: No murmur heard.    No friction rub. No gallop.   Pulmonary:      Breath sounds: Normal breath sounds.   Abdominal:      General: Bowel sounds are normal. There is no distension.      Palpations: Abdomen is soft.      Tenderness: There is no abdominal tenderness.   Lymphadenopathy:      Cervical: No cervical adenopathy.   Skin:     General: Skin is warm.      Findings: No rash.   Neurological:      General: No focal deficit present.      Mental Status: She is oriented to person, place, and time.      Cranial Nerves: No cranial nerve deficit.   Psychiatric:         Mood and Affect: Mood is not anxious or depressed. Affect is not inappropriate.         Speech: She is noncommunicative. Speech is delayed.         Behavior: Behavior is slowed.      Comments:  Pseudobulbar affect         CRANIAL NERVES     CN III, IV, VI   Pupils are equal, round, and reactive to light.     Significant Labs: All pertinent labs within the past 24 hours have been reviewed.    Significant Imaging: I have reviewed all pertinent imaging results/findings within the past 24 hours.    Assessment/Plan:     * Hypothyroidism  -elevated TSH, low T4   -started on levothyroxine 150 mcg   -repeat TSH tomorrow, Q 2 days for 4 days and then subsequently would need repeat TSH in 6 weeks    AMS (altered mental status)  -metabolic encephalopathy secondary to profound hypothyroidism   -record review reveals patient has had an inpatient psychiatric admission previously however no notes available  -pending HIV, syphilis, B12, folate      Acute kidney insufficiency  -likely prerenal   -repeat BMP    Essential hypertension  -continue with lisinopril, hydrochlorothiazide  -patient had permissive hypertension for 24 hours, okay to control now    Stroke  -CT head found pronounced microvascular change  -MRI found small parietal lobe acute to subacute nonhemorrhagic infarct  -aspirin, atorvastatin, BP control    Admit to observation status under my care  VTE Risk Mitigation (From admission, onward)         Ordered     enoxaparin injection 40 mg  Daily         01/30/23 0718     IP VTE HIGH RISK PATIENT  Once         01/30/23 0718     Place sequential compression device  Until discontinued         01/29/23 4206                   Thairy G Reyes, DO  Department of Hospital Medicine   [facility]

## 2023-01-30 NOTE — PLAN OF CARE
Goals to be met by: Discharge     Patient will increase functional independence with mobility by performin. Supine to sit with Modified [place]  . Sit to supine with Modified [place]  2. Sit to stand transfer with Modified [place]  3. Bed to chair transfer with Modified [place] using Rolling Walker  4. Gait  x 175 feet with Modified [place] using Rolling Walker.   5. Increased functional strength to 5/5 for LLE.

## 2023-01-30 NOTE — PLAN OF CARE
Problem: SLP  Goal: SLP Goal  Description: LTG: Pt will improve cognitive linguistic skills to SBA level to ensure safety upon discharge home.    STG:  Pt will orient x4 [place].  Pt will attend to structured task for 3 minutes given 3 or less redirections/cues.  Pt will follow 3-step commands w/ 80% acc modA.  Pt will provide solutions to common situations w/ 90% acc [place].  Pt will complete 3-4 step sequencing tasks w/ 80% acc modA.  Outcome: Ongoing, Progressing

## 2023-01-30 NOTE — PT/OT/SLP EVAL
Occupational Therapy Evaluation and Treatment    Name: Alysia Washington  MRN: [id number]  Admitting Diagnosis: Hypothyroidism  Recent Surgery: * No surgery found *      Recommendations:     Discharge Recommendations: nursing facility, skilled, rehabilitation facility  Level of Assistance Recommended: 24 hours significant assistance  Discharge Equipment Recommendations: walker, rolling, bedside commode, bath bench  Barriers to discharge: Decreased caregiver support    Assessment:     Alysia Washington is a 52 y.o. female with a medical diagnosis of Hypothyroidism. She presents with performance deficits affecting function including weakness, impaired endurance, impaired self care skills, impaired functional mobility, impaired balance, decreased lower extremity function, decreased safety awareness, impaired coordination, edema. Pt noted with possible slow processing and coordination with functional mobility in room and when responding to questions.     Rehab Prognosis: Good; patient would benefit from acute skilled OT services to address these deficits and reach maximum level of function.    Plan:     Patient to be seen 6 x/week to address the above listed problems via therapeutic exercises, therapeutic activities, self-care/home management  Plan of Care Expires: 02/06/23  Plan of Care Reviewed with: patient    Subjective     Chief Complaint: Heaviness of L foot  Patient Comments/Goals: Return home at [facility]  Pain/Comfort:  Pain Rating 1: 0/10    Patients cultural, spiritual, Sikhism conflicts given the current situation: no    Social History:  Living Environment: Patient  lives alone but has a friend that stops by  in a single story home, number of outside stairs: 0, tub-shower combo.  Prior Level of Function: Prior to admission, patient was independent with ADLs.  Roles and Routines:   Equipment Used at Home: cane, straight  DME owned (not currently used):  cane  Assistance Upon Discharge: friend.    Objective:      Communicated with PT prior to session. Patient found HOB elevated with bed alarm, blood pressure cuff, PureWick, telemetry upon OT entry to room.    General Precautions: Standard, fall   Orthopedic Precautions:    Braces: N/A  Respiratory Status: Room air    Occupational Performance    Bed Mobility:  Rolling/Turning to Right with moderate assistance  Scooting anteriorly to EOB to have both feet planted on floor: contact guard assistance  Supine to sit from right side of bed with moderate assistance    Functional Mobility/Transfers:  Sit <> Stand Transfer with minimum assistance with rolling walker  Functional Mobility: 5-10ft in room using RW for support and requiring moderate verbal cues for guidance    Activities of Daily Living:  Feeding:  set up assistance    Grooming: minimum assistance in standing  Bathing: moderate assistance    Upper Body Dressing: set up assistance    Lower Body Dressing: moderate assistance    Toileting: moderate assistance      Cognitive/Visual Perceptual:  Cognitive/Psychosocial Skills:     -       Oriented to: Person, Place, Time, and Situation   -       Follows Commands/attention:possible slow processing requiring extra time to carry out commands    Physical Exam:  Upper Extremity Range of Motion:     -       Right Upper Extremity: WFL  -       Left Upper Extremity: WFL  Upper Extremity Strength:    -       Right Upper Extremity: Deficits: 3+/5  -       Left Upper Extremity: Deficits: 3+/5   Strength:    -       Right Upper Extremity: WFL  -       Left Upper Extremity: WFL  Gross motor coordination:   WFL    AMPAC 6 Click ADL:  AMPAC Total Score: 20    Treatment & Education:  Patient educated on role of OT, POC and goals for therapy      Patient clear to ambulate to/from bathroom with RN/PCT, assist xmin A, gait belt, and RW .    Patient left HOB elevated with all lines intact, call button in reach, and bed alarm on.    GOALS:   Multidisciplinary Problems       Occupational  Therapy Goals          Problem: Occupational Therapy    Goal Priority Disciplines Outcome Interventions   Occupational Therapy Goal     OT, PT/OT Ongoing, Progressing    Description: Goals to be met by: 2/6/23     Patient will increase functional independence with ADLs by performing:    LE Dressing with Minimal Assistance.  Grooming while standing at sink with Stand-by Assistance.  Toileting from toilet with Minimal Assistance for hygiene and clothing management.   Bathing from  shower chair/bench with Minimal Assistance.  Toilet transfer to toilet with Contact Guard Assistance.  Increased functional strength to 5/5 for ADL.                         History:     History reviewed. No pertinent past medical history.    History reviewed. No pertinent surgical history.    Time Tracking:     OT Date of Treatment: 01/30/23  OT Start Time: 0902  OT Stop Time: 0930  OT Total Time (min): 28 min    Billable Minutes: Evaluation 28 1/30/2023

## 2023-01-30 NOTE — ED NOTES
Assumed care from Libertad SHELLEY. Patient has bizarre behavior and trying to get out of the bed. She is able to follow simple commands. She has swelling and redness to left foot.

## 2023-01-30 NOTE — PLAN OF CARE
Problem: Fall Injury Risk  Goal: Absence of Fall and Fall-Related Injury  Outcome: Ongoing, Progressing  Intervention: Identify and Manage Contributors  Flowsheets (Taken 1/30/2023 0434)  Self-Care Promotion: independence encouraged  Medication Review/Management: infusion initiated     Problem: Infection  Goal: Absence of Infection Signs and Symptoms  Outcome: Ongoing, Progressing  Intervention: Prevent or Manage Infection  Flowsheets (Taken 1/30/2023 0434)  Fever Reduction/Comfort Measures: fluid intake increased  Infection Management: aseptic technique maintained  Isolation Precautions: protective     Problem: Renal Function Impairment (Acute Kidney Injury/Impairment)  Goal: Effective Renal Function  Outcome: Ongoing, Progressing  Intervention: Monitor and Support Renal Function  Flowsheets (Taken 1/30/2023 0434)  Medication Review/Management: infusion initiated

## 2023-01-30 NOTE — PLAN OF CARE
[facility]  Initial Discharge Assessment       Primary Care Provider: No primary care provider on file.    Admission Diagnosis: Accelerated hypertension [I10]  Acute kidney insufficiency [N28.9]  Cellulitis of left foot [L03.116]  Hypothyroidism, unspecified type [E03.9]    Admission Date: 1/29/2023  Expected Discharge Date:     Discharge Barriers Identified: None    Payor: MEDICAID / Plan: [place]UE [place] / Product Type: Managed Medicaid /     No emergency contact information on file.    Discharge Plan A: Home with family, Home Health         [id number] - [place], [place] - [address] AT [facility] [address]  [address]  [facility] [address]  Phone: [phone] Fax: [phone]      Initial Assessment (most recent)       Adult Discharge Assessment - 01/30/23 1717          Discharge Assessment    Assessment Type Discharge Planning Assessment     Confirmed/corrected address, phone number and insurance Yes     Confirmed Demographics Correct on Facesheet     Source of Information patient     If unable to respond/provide information was family/caregiver contacted? No Contact Information Available     Does patient/caregiver understand observation status Yes     Communicated LUIS ANTONIO with patient/caregiver Date not available/Unable to determine     Reason For Admission AMS     People in Home alone     Facility Arrived From: Home     Do you expect to return to your current living situation? Yes     Do you have help at home or someone to help you manage your care at home? No     Prior to hospitilization cognitive status: Alert/Oriented     Current cognitive status: Not Oriented to Person     Home Layout Able to live on 1st floor     Equipment Currently Used at Home none     Readmission within 30 days? No     Patient currently being followed by outpatient case management? No     Do you currently have service(s) that help you manage your care at home? No      Do you take prescription medications? Yes     Do you have prescription coverage? Yes     Coverage Medicaid     Do you have any problems affording any of your prescribed medications? TBD     How do you get to doctors appointments? family or friend will provide     Are you on dialysis? No     Do you take coumadin? No     Discharge Plan A Home with family;Home Health     DME Needed Upon Discharge  none     Discharge Barriers Identified None        Physical Activity    On average, how many days per week do you engage in moderate to strenuous exercise (like a brisk walk)? 0 days     On average, how many minutes do you engage in exercise at this level? 0 min        Financial Resource Strain    How hard is it for you to pay for the very basics like food, housing, medical care, and heating? Not hard at all        Housing Stability    In the last 12 months, was there a time when you were not able to pay the mortgage or rent on time? No     In the last 12 months, how many places have you lived? 1     In the last 12 months, was there a time when you did not have a steady place to sleep or slept in a shelter (including now)? No        Transportation Needs    In the past 12 months, has lack of transportation kept you from medical appointments or from getting medications? No     In the past 12 months, has lack of transportation kept you from meetings, work, or from getting things needed for daily living? No        Food Insecurity    Within the past 12 months, you worried that your food would run out before you got the money to buy more. Never true     Within the past 12 months, the food you bought just didn't last and you didn't have money to get more. Never true        Stress    Do you feel stress - tense, restless, nervous, or anxious, or unable to sleep at night because your mind is troubled all the time - these days? Only a little        Social Connections    In a typical week, how many times do you talk on the phone with  family, friends, or neighbors? More than three times a week     How often do you get together with friends or relatives? More than three times a week     How often do you attend Protestant or Gnosticist services? More than 4 times per year     Do you belong to any clubs or organizations such as Protestant groups, unions, fraternal or athletic groups, or school groups? No     How often do you attend meetings of the clubs or organizations you belong to? Never     Are you , , , , never , or living with a partner? Never         Alcohol Use    Q1: How often do you have a drink containing alcohol? Never     Q2: How many drinks containing alcohol do you have on a typical day when you are drinking? Patient does not drink     Q3: How often do you have six or more drinks on one occasion? Never

## 2023-01-30 NOTE — PROGRESS NOTES
[facility]  Wound Care    Patient Name:  Alysia Washington   MRN:  [id number]  Date: 1/30/2023  Diagnosis: Hypothyroidism    History:     History reviewed. No pertinent past medical history.    Social History     Socioeconomic History    Marital status: Unknown       Precautions:     Allergies as of 01/29/2023    (No Known Allergies)       WO Assessment Details/Treatment   Consult obtained for L foot cellulitis.  Assessment performed.   No erythema, no warmth or pain present with palpation.  Recommend pressure prevention measures while she is hospitalized due to altered mental status at time of assessment.         01/30/23 1204   WOCN Assessment   WOCN Total Time (mins) 10   Visit Date 01/30/23   Visit Time 1204   Consult Type New   WOCN Speciality Wound   Number of Wounds 0   Intervention assessed         Recommendations made to primary team for pressure prevention measures while hospitalized and assistance with hygiene.  Orders placed.     01/30/2023

## 2023-01-30 NOTE — HPI
Pt exhibiting selective mutism. No records available thus history obtained from record review. Pt presented to the ED complaining of redness, swelling and pain to her left foot that has worsened over the last few months. Boyfriend stated she has not been taking her medication for years and notes that she is been acting differently lately with sluggishness and sometimes seems confused.  In the ED patient with hypertensive urgency as high as 265/154, and profound hypothyroidism with a TSH of 138, T4 less than 0.42 and T3 1.35.  Patient with no concomitant hemodynamic instability or hyponatremia.  Low suspicion for myxedema coma given cortisol level. CT head found pronounced microvascular change and MRI showed left parietal lobe acute to subacute nonhemorrhagic infarct and old lacunar infarcts and severe microangiopathic edema.

## 2023-01-31 PROBLEM — F19.90 ILLICIT DRUG USE: Status: ACTIVE | Noted: 2023-01-31

## 2023-01-31 PROBLEM — E87.8 ELECTROLYTE ABNORMALITY: Status: ACTIVE | Noted: 2023-01-31

## 2023-01-31 LAB
ANION GAP SERPL CALC-SCNC: 12 MEQ/L
BASOPHILS # BLD AUTO: 0.05 X10(3)/MCL (ref 0–0.2)
BASOPHILS NFR BLD AUTO: 0.5 %
BUN SERPL-MCNC: 22.6 MG/DL (ref 9.8–20.1)
CALCIUM SERPL-MCNC: 8.8 MG/DL (ref 8.4–10.2)
CHLORIDE SERPL-SCNC: 106 MMOL/L (ref 98–107)
CHOLEST SERPL-MCNC: 280 MG/DL
CHOLEST/HDLC SERPL: 7 {RATIO} (ref 0–5)
CO2 SERPL-SCNC: 24 MMOL/L (ref 22–29)
CREAT SERPL-MCNC: 1.64 MG/DL (ref 0.55–1.02)
CREAT/UREA NIT SERPL: 14
EOSINOPHIL # BLD AUTO: 0.42 X10(3)/MCL (ref 0–0.9)
EOSINOPHIL NFR BLD AUTO: 3.8 %
ERYTHROCYTE [DISTWIDTH] IN BLOOD BY AUTOMATED COUNT: 16.8 % (ref 11.5–17)
EST. AVERAGE GLUCOSE BLD GHB EST-MCNC: 151.3 MG/DL
GFR SERPLBLD CREATININE-BSD FMLA CKD-EPI: 37 MLS/MIN/1.73/M2
GLUCOSE SERPL-MCNC: 122 MG/DL (ref 74–100)
HBA1C MFR BLD: 6.9 %
HCT VFR BLD AUTO: 37.5 % (ref 37–47)
HDLC SERPL-MCNC: 42 MG/DL (ref 35–60)
HGB BLD-MCNC: 11.3 GM/DL (ref 12–16)
HIV 1+2 AB+HIV1 P24 AG SERPL QL IA: NONREACTIVE
IMM GRANULOCYTES # BLD AUTO: 0.09 X10(3)/MCL (ref 0–0.04)
IMM GRANULOCYTES NFR BLD AUTO: 0.8 %
LDLC SERPL CALC-MCNC: 205 MG/DL (ref 50–140)
LYMPHOCYTES # BLD AUTO: 2.88 X10(3)/MCL (ref 0.6–4.6)
LYMPHOCYTES NFR BLD AUTO: 26.3 %
MAGNESIUM SERPL-MCNC: 2.3 MG/DL (ref 1.6–2.6)
MCH RBC QN AUTO: 26 PG
MCHC RBC AUTO-ENTMCNC: 30.1 MG/DL (ref 33–36)
MCV RBC AUTO: 86.2 FL (ref 80–94)
MONOCYTES # BLD AUTO: 0.6 X10(3)/MCL (ref 0.1–1.3)
MONOCYTES NFR BLD AUTO: 5.5 %
NEUTROPHILS # BLD AUTO: 6.92 X10(3)/MCL (ref 2.1–9.2)
NEUTROPHILS NFR BLD AUTO: 63.1 %
PHOSPHATE SERPL-MCNC: 4.2 MG/DL (ref 2.3–4.7)
PLATELET # BLD AUTO: 329 X10(3)/MCL (ref 130–400)
PMV BLD AUTO: 10.7 FL (ref 7.4–10.4)
POTASSIUM SERPL-SCNC: 3.3 MMOL/L (ref 3.5–5.1)
RBC # BLD AUTO: 4.35 X10(6)/MCL (ref 4.2–5.4)
SODIUM SERPL-SCNC: 142 MMOL/L (ref 136–145)
T PALLIDUM AB SER QL: NONREACTIVE
TRIGL SERPL-MCNC: 163 MG/DL (ref 37–140)
TSH SERPL-ACNC: 149.63 UIU/ML (ref 0.35–4.94)
VLDLC SERPL CALC-MCNC: 33 MG/DL
WBC # SPEC AUTO: 11 X10(3)/MCL (ref 4.5–11.5)

## 2023-01-31 PROCEDURE — 11000001 HC ACUTE MED/SURG PRIVATE ROOM

## 2023-01-31 PROCEDURE — 85025 COMPLETE CBC W/AUTO DIFF WBC: CPT | Performed by: STUDENT IN AN ORGANIZED HEALTH CARE EDUCATION/TRAINING PROGRAM

## 2023-01-31 PROCEDURE — 63600175 PHARM REV CODE 636 W HCPCS: Performed by: STUDENT IN AN ORGANIZED HEALTH CARE EDUCATION/TRAINING PROGRAM

## 2023-01-31 PROCEDURE — 97130 THER IVNTJ EA ADDL 15 MIN: CPT

## 2023-01-31 PROCEDURE — 84443 ASSAY THYROID STIM HORMONE: CPT | Performed by: STUDENT IN AN ORGANIZED HEALTH CARE EDUCATION/TRAINING PROGRAM

## 2023-01-31 PROCEDURE — 25000003 PHARM REV CODE 250: Performed by: STUDENT IN AN ORGANIZED HEALTH CARE EDUCATION/TRAINING PROGRAM

## 2023-01-31 PROCEDURE — 80048 BASIC METABOLIC PNL TOTAL CA: CPT | Performed by: STUDENT IN AN ORGANIZED HEALTH CARE EDUCATION/TRAINING PROGRAM

## 2023-01-31 PROCEDURE — 83735 ASSAY OF MAGNESIUM: CPT | Performed by: STUDENT IN AN ORGANIZED HEALTH CARE EDUCATION/TRAINING PROGRAM

## 2023-01-31 PROCEDURE — 97129 THER IVNTJ 1ST 15 MIN: CPT

## 2023-01-31 PROCEDURE — 84100 ASSAY OF PHOSPHORUS: CPT | Performed by: STUDENT IN AN ORGANIZED HEALTH CARE EDUCATION/TRAINING PROGRAM

## 2023-01-31 PROCEDURE — 83036 HEMOGLOBIN GLYCOSYLATED A1C: CPT | Performed by: STUDENT IN AN ORGANIZED HEALTH CARE EDUCATION/TRAINING PROGRAM

## 2023-01-31 PROCEDURE — 94760 N-INVAS EAR/PLS OXIMETRY 1: CPT

## 2023-01-31 PROCEDURE — 80061 LIPID PANEL: CPT | Performed by: STUDENT IN AN ORGANIZED HEALTH CARE EDUCATION/TRAINING PROGRAM

## 2023-01-31 RX ORDER — NIFEDIPINE 30 MG/1
60 TABLET, EXTENDED RELEASE ORAL DAILY
Status: DISCONTINUED | OUTPATIENT
Start: 2023-02-01 | End: 2023-02-07 | Stop reason: HOSPADM

## 2023-01-31 RX ORDER — NIFEDIPINE 30 MG/1
30 TABLET, EXTENDED RELEASE ORAL DAILY
Status: DISCONTINUED | OUTPATIENT
Start: 2023-01-31 | End: 2023-01-31

## 2023-01-31 RX ORDER — POTASSIUM CHLORIDE 20 MEQ/1
40 TABLET, EXTENDED RELEASE ORAL 2 TIMES DAILY
Status: COMPLETED | OUTPATIENT
Start: 2023-01-31 | End: 2023-02-01

## 2023-01-31 RX ORDER — FOLIC ACID 1 MG/1
1 TABLET ORAL DAILY
Status: DISCONTINUED | OUTPATIENT
Start: 2023-01-31 | End: 2023-02-07 | Stop reason: HOSPADM

## 2023-01-31 RX ORDER — SODIUM CHLORIDE, SODIUM LACTATE, POTASSIUM CHLORIDE, CALCIUM CHLORIDE 600; 310; 30; 20 MG/100ML; MG/100ML; MG/100ML; MG/100ML
INJECTION, SOLUTION INTRAVENOUS CONTINUOUS
Status: ACTIVE | OUTPATIENT
Start: 2023-01-31 | End: 2023-02-01

## 2023-01-31 RX ADMIN — HYDROCHLOROTHIAZIDE 12.5 MG: 12.5 TABLET ORAL at 09:01

## 2023-01-31 RX ADMIN — NIFEDIPINE 30 MG: 30 TABLET, FILM COATED, EXTENDED RELEASE ORAL at 09:01

## 2023-01-31 RX ADMIN — LEVOTHYROXINE SODIUM 150 MCG: 0.1 TABLET ORAL at 05:01

## 2023-01-31 RX ADMIN — LABETALOL HYDROCHLORIDE 10 MG: 5 INJECTION, SOLUTION INTRAVENOUS at 03:01

## 2023-01-31 RX ADMIN — POTASSIUM CHLORIDE 40 MEQ: 1500 TABLET, EXTENDED RELEASE ORAL at 09:01

## 2023-01-31 RX ADMIN — CEFAZOLIN 1 G: 330 INJECTION, POWDER, FOR SOLUTION INTRAMUSCULAR; INTRAVENOUS at 06:01

## 2023-01-31 RX ADMIN — ENOXAPARIN SODIUM 40 MG: 40 INJECTION SUBCUTANEOUS at 04:01

## 2023-01-31 RX ADMIN — LABETALOL HYDROCHLORIDE 10 MG: 5 INJECTION, SOLUTION INTRAVENOUS at 12:01

## 2023-01-31 RX ADMIN — FOLIC ACID 1 MG: 1 TABLET ORAL at 03:01

## 2023-01-31 RX ADMIN — FAMOTIDINE 20 MG: 20 TABLET, FILM COATED ORAL at 09:01

## 2023-01-31 RX ADMIN — ATORVASTATIN CALCIUM 40 MG: 40 TABLET, FILM COATED ORAL at 09:01

## 2023-01-31 RX ADMIN — LABETALOL HYDROCHLORIDE 10 MG: 5 INJECTION, SOLUTION INTRAVENOUS at 11:01

## 2023-01-31 RX ADMIN — SODIUM CHLORIDE, POTASSIUM CHLORIDE, SODIUM LACTATE AND CALCIUM CHLORIDE: 600; 310; 30; 20 INJECTION, SOLUTION INTRAVENOUS at 03:01

## 2023-01-31 RX ADMIN — LISINOPRIL 20 MG: 10 TABLET ORAL at 09:01

## 2023-01-31 RX ADMIN — ASPIRIN 81 MG: 81 TABLET, COATED ORAL at 09:01

## 2023-01-31 NOTE — PT/OT/SLP PROGRESS
Name: Alysia Washington    : 1970 (52 y.o.)  MRN: [id number]           Patient Unavailable      Patient unable to be seen at this time secondary to: pt refused treatment at this time- will attempt again later

## 2023-01-31 NOTE — ASSESSMENT & PLAN NOTE
-elevated TSH, low T4   -started on levothyroxine 150 mcg   -repeat TSH Q 2 days for 4 days and then subsequently would need repeat TSH in 6 weeks

## 2023-01-31 NOTE — HOSPITAL COURSE
Patient admitted for altered mentation secondary to toxic and metabolic causes. She was started on levothyroxine for profound hypothyrodism. Also exhibiting selective mutism. She was evaluated by tele psych who recommend risperidone, sertraline and inpatient psych. Dose of risperidone decreased 2/2 due to somnolence. Poorly controlled hypertension, titrating medications.

## 2023-01-31 NOTE — ASSESSMENT & PLAN NOTE
-continue with lisinopril, hydrochlorothiazide, added nifedipine  -patient had permissive hypertension for 24 hours, okay to control now

## 2023-01-31 NOTE — PLAN OF CARE
Problem: Adult Inpatient Plan of Care  Goal: Plan of Care Review  Outcome: Ongoing, Progressing  Flowsheets (Taken 1/30/2023 2316)  Plan of Care Reviewed With: patient  Goal: Patient-Specific Goal (Individualized)  Outcome: Ongoing, Progressing  Flowsheets (Taken 1/30/2023 2316)  Anxieties, Fears or Concerns: length of hospital stay  Individualized Care Needs: continue to monitor and tx bp     Problem: Fluid and Electrolyte Imbalance (Acute Kidney Injury/Impairment)  Goal: Fluid and Electrolyte Balance  Outcome: Ongoing, Progressing  Intervention: Monitor and Manage Fluid and Electrolyte Balance  Flowsheets (Taken 1/30/2023 2316)  Fluid/Electrolyte Management: electrolyte supplement initiated

## 2023-01-31 NOTE — PLAN OF CARE
Patient requesting acute rehab services. Referral sent to [facility], [place] [facility]

## 2023-01-31 NOTE — PROGRESS NOTES
Upon arrival to room, pt was laying in bed. Pt stated she was not ready to get up and wanted to rest longer. Will attempt to continue with POC later this AM if schedule allows.

## 2023-01-31 NOTE — PROGRESS NOTES
"Inpatient Nutrition Evaluation    Admit Date: 1/29/2023   Total duration of encounter: 2 days    Nutrition Recommendation/Prescription     Continue low sodium diet.     Nutrition Assessment     Chart Review    Reason Seen: length of stay    Malnutrition Screening Tool Results   Have you recently lost weight without trying?: No  Have you been eating poorly because of a decreased appetite?: No   MST Score: 0     Diagnosis:  Hypothyroidism 1/30/2023      Acute kidney insufficiency 1/30/2023      Essential hypertension 1/30/2023      AMS (altered mental status) 1/30/2023      Stroke        Relevant Medical History:     Nutrition-Related Medications: atorvastatin, levothyroxine, hydrochlorothiazide,     Nutrition-Related Labs:   Latest Reference Range & Units 01/31/23 03:35   Sodium 136 - 145 mmol/L 142   Potassium 3.5 - 5.1 mmol/L 3.3 (L)   Chloride 98 - 107 mmol/L 106   CO2 22 - 29 mmol/L 24   Anion Gap mEq/L 12.0   BUN 9.8 - 20.1 mg/dL 22.6 (H)   Creatinine 0.55 - 1.02 mg/dL 1.64 (H)   BUN/CREAT RATIO  14   eGFR mls/min/1.73/m2 37   Glucose 74 - 100 mg/dL 122 (H)   Calcium 8.4 - 10.2 mg/dL 8.8   Phosphorus 2.3 - 4.7 mg/dL 4.2   Magnesium 1.60 - 2.60 mg/dL 2.30   (L): Data is abnormally low  (H): Data is abnormally high    Diet Order: Diet Low Sodium, 2gm  Oral Supplement Order: none  Appetite/Oral Intake: good/% of meals  Factors Affecting Nutritional Intake: none identified  Food/Lutheran/Cultural Preferences: none reported  Food Allergies: none reported    Skin Integrity: rash  Wound(s):         Comments    Pt reports intake is good. Discussed food preferences and aleah menus. Will monitor.     Anthropometrics    Height: 5' 6" (167.6 cm) Height Method: Stated  Last Weight: 85.5 kg (188 lb 7.9 oz) (01/30/23 0018) Weight Method: Bed Scale  BMI (Calculated): 30.4  BMI Classification: obese grade I (BMI 30-34.9)     Ideal Body Weight (IBW), Female: 130 lb     % Ideal Body Weight, Female (lb): 145 %               "               Usual Weight Provided By: unable to obtain usual weight    Wt Readings from Last 3 Encounters:   01/30/23 0018 85.5 kg (188 lb 7.9 oz)   01/29/23 1955 68 kg (150 lb)   05/04/15 1403 71.7 kg (157 lb 15.7 oz)      Weight Change(s) Since Admission:  Admit Weight: 68 kg (150 lb) (01/29/23 1955)      Patient Education    Not applicable.    Monitoring & Evaluation     Dietitian will monitor food and beverage intake, weight, weight change, electrolyte/renal panel, glucose/endocrine profile, and gastrointestinal profile.  Nutrition Risk/Follow-Up: low (follow-up in 5-7 days)  Patients assigned 'low nutrition risk' status do not qualify for a full nutritional assessment but will be monitored and re-evaluated in a 5-7 day time period. Please consult if re-evaluation needed sooner.

## 2023-01-31 NOTE — PLAN OF CARE
Problem: Adult Inpatient Plan of Care  Goal: Plan of Care Review  Outcome: Ongoing, Progressing  Goal: Patient-Specific Goal (Individualized)  Outcome: Ongoing, Progressing  Flowsheets (Taken 1/31/2023 1728)  Anxieties, Fears or Concerns: length of hospital stay  Individualized Care Needs: monitoring bp  Goal: Absence of Hospital-Acquired Illness or Injury  Outcome: Ongoing, Progressing  Intervention: Identify and Manage Fall Risk  Flowsheets (Taken 1/31/2023 1728)  Safety Promotion/Fall Prevention:   bed alarm set   assistive device/personal item within reach   nonskid shoes/socks when out of bed   side rails raised x 2  Intervention: Prevent Skin Injury  Flowsheets (Taken 1/31/2023 1728)  Skin Protection:   adhesive use limited   incontinence pads utilized   tubing/devices free from skin contact  Intervention: Prevent and Manage VTE (Venous Thromboembolism) Risk  Flowsheets (Taken 1/31/2023 1728)  Activity Management: Rolling - L1  VTE Prevention/Management: bleeding precations maintained  Goal: Optimal Comfort and Wellbeing  Outcome: Ongoing, Progressing  Goal: Readiness for Transition of Care  Outcome: Ongoing, Progressing     Problem: Skin Injury Risk Increased  Goal: Skin Health and Integrity  Outcome: Ongoing, Progressing  Intervention: Optimize Skin Protection  Flowsheets (Taken 1/31/2023 1728)  Pressure Reduction Techniques: frequent weight shift encouraged  Skin Protection:   adhesive use limited   incontinence pads utilized   tubing/devices free from skin contact  Head of Bed (HOB) Positioning: HOB at 30 degrees     Problem: Fall Injury Risk  Goal: Absence of Fall and Fall-Related Injury  Outcome: Ongoing, Progressing  Intervention: Identify and Manage Contributors  Flowsheets (Taken 1/31/2023 1728)  Self-Care Promotion:   safe use of adaptive equipment encouraged   independence encouraged   BADL personal objects within reach   BADL personal routines maintained   meal set-up provided  Medication  Review/Management: medications reviewed     Problem: Infection  Goal: Absence of Infection Signs and Symptoms  Outcome: Ongoing, Progressing  Intervention: Prevent or Manage Infection  Flowsheets (Taken 1/31/2023 1728)  Isolation Precautions: protective     Problem: Fluid and Electrolyte Imbalance (Acute Kidney Injury/Impairment)  Goal: Fluid and Electrolyte Balance  Outcome: Ongoing, Progressing     Problem: Oral Intake Inadequate (Acute Kidney Injury/Impairment)  Goal: Optimal Nutrition Intake  Outcome: Ongoing, Progressing  Intervention: Promote and Optimize Nutrition  Flowsheets (Taken 1/31/2023 1728)  Oral Nutrition Promotion: safe use of adaptive equipment encouraged     Problem: Renal Function Impairment (Acute Kidney Injury/Impairment)  Goal: Effective Renal Function  Outcome: Ongoing, Progressing  Intervention: Monitor and Support Renal Function  Flowsheets (Taken 1/31/2023 1728)  Medication Review/Management: medications reviewed

## 2023-01-31 NOTE — ASSESSMENT & PLAN NOTE
-CT head found pronounced microvascular change  -MRI found small parietal lobe acute to subacute nonhemorrhagic infarct  -aspirin, atorvastatin, BP control  -ascvd risk of 72.3%

## 2023-01-31 NOTE — CONSULTS
"1/31/2023 5:23 PM   Alysia Washington   1970   [id number]            Psychiatry Inpatient Admission Note    Date of Admission: 1/29/2023  7:58 PM    Current Legal Status: Uncontested  Principal Problems: Depression  Encounter : Per Telemedicine.   SUBJECTIVE:   History of Present Illness:   Alysia Washington is a 52 y.o. female who presented to the ED complaining of redness, swelling  and pain to her left foot that has worsened  over the last few months. According to ED notes, patient's boyfriend stated patient had stopped taking her medications for years and she was acting confused. Her blood pressure on arrival was 265/154.  CT head found pronounced microvascular change and MRI showed left parietal lobe acute to subacute nonhemorrhagic infarct and old lacunar infarcts and severe microangiopathic edema.  Patient is awake in bed and eating. She is a poor historian. She is tearful. Ms Rao informs me that she arrived at the hospital "a few days ago". She admits feeling depressed. When asked about the presence of perceptual disturbances, she denies auditory or visual hallucinations. She denies suicidal ideations. She denies anger and recent impulsive behavior. Nursing staff reports that patient was previously non verbal and had been crying and sometimes laughing frequently.     Collateral  I called Mr Jaimes, patient's boyfriend. He provided substantial information about her past psychiatric history. He reports  progressive worsening of her mood symptoms. He reports she had stopped taking care of herself. Mr Jaimes explains that patient was  admitted to [facility] "a few years ago" to stabilize  her "thyroid". He adds that she was on antidepressant but had stopped taking the medications. He had recently observed patient "talking out of her head and laughing" and she once asked him to "cut my throat when I am sleeping". Mr Jaimes is concerned that patient is going through some "nervous breakdown". He further " "explains that her 2 kids and other family member had stopped talking to her  and that "was upsetting to her".      Previous Psychiatric Hospitalizations: [facility]  Previous Medication Trials: Unknown  Previous Suicide Attempts: Denies   Outpatient psychiatrist: Unknown    Past Medical/Surgical History:   History reviewed. No pertinent past medical history.  History reviewed. No pertinent surgical history.    Family Psychiatric History:   Depression     Allergies:   Review of patient's allergies indicates:  No Known Allergies    Substance Abuse History:   Tobacco: Yes  Alcohol: Denies  Illicit Substances: Denies      Current Medications:   Home Psychiatric Meds: NA    Scheduled Meds:    aspirin  81 mg Oral Daily    atorvastatin  40 mg Oral QHS    enoxaparin  40 mg Subcutaneous Daily    famotidine  20 mg Oral Daily    folic acid  1 mg Oral Daily    lisinopriL  20 mg Oral Daily    And    hydroCHLOROthiazide  12.5 mg Oral Daily    levothyroxine  150 mcg Oral Before breakfast    [START ON 2/1/2023] NIFEdipine  60 mg Oral Daily    potassium chloride  40 mEq Oral BID      PRN Meds: acetaminophen, albuterol-ipratropium, aluminum-magnesium hydroxide-simethicone, bisacodyL, dextrose 10%, dextrose 10%, glucagon (human recombinant), glucose, glucose, HYDROcodone-acetaminophen, labetalol, melatonin, morphine, naloxone, ondansetron, ondansetron, polyethylene glycol, simethicone, sodium chloride 0.9%   Psychotherapeutics (From admission, onward)      None            Social History:  Housing Status: Lives alone  Relationship Status/Sexual Orientation:    Children: 2, both estranged  Education: Trade school   Employment Status/Info: Disable    history: No  history  History of physical/sexual abuse: Denies   Access to gun: Unknown       Legal History:   Past Charges/Incarcerations: Unknown   Pending charges: NA      OBJECTIVE:   Medical Review Of Systems:  A comprehensive review of systems was " negative except for: Cardiovascular: positive for hypertension  Neurological: positive for gait problems, weakness, and Strojke  Behavioral/Psych: positive for anxiety, depression, obesity, and psychotic symptoms : Hallucinations    Vitals   Vitals:    01/31/23 1136   BP: (!) 168/96   Pulse: 78   Resp:    Temp:         Labs/Imaging/Studies:   Recent Results (from the past 48 hour(s))   Comprehensive metabolic panel    Collection Time: 01/29/23  8:38 PM   Result Value Ref Range    Sodium Level 141 136 - 145 mmol/L    Potassium Level 3.2 (L) 3.5 - 5.1 mmol/L    Chloride 103 98 - 107 mmol/L    Carbon Dioxide 25 22 - 29 mmol/L    Glucose Level 131 (H) 74 - 100 mg/dL    Blood Urea Nitrogen 24.7 (H) 9.8 - 20.1 mg/dL    Creatinine 1.98 (H) 0.55 - 1.02 mg/dL    Calcium Level Total 9.1 8.4 - 10.2 mg/dL    Protein Total 7.7 6.4 - 8.3 gm/dL    Albumin Level 3.5 3.5 - 5.0 g/dL    Globulin 4.2 (H) 2.4 - 3.5 gm/dL    Albumin/Globulin Ratio 0.8 (L) 1.1 - 2.0 ratio    Bilirubin Total 0.5 <=1.5 mg/dL    Alkaline Phosphatase 105 40 - 150 unit/L    Alanine Aminotransferase 36 0 - 55 unit/L    Aspartate Aminotransferase 31 5 - 34 unit/L    eGFR 30 mls/min/1.73/m2   TSH    Collection Time: 01/29/23  8:38 PM   Result Value Ref Range    Thyroid Stimulating Hormone 138.490 (H) 0.350 - 4.940 uIU/mL   CBC with Differential    Collection Time: 01/29/23  8:38 PM   Result Value Ref Range    WBC 10.4 4.5 - 11.5 x10(3)/mcL    RBC 4.57 4.20 - 5.40 x10(6)/mcL    Hgb 11.8 (L) 12.0 - 16.0 gm/dL    Hct 39.3 37.0 - 47.0 %    MCV 86.0 80.0 - 94.0 fL    MCH 25.8 pg    MCHC 30.0 (L) 33.0 - 36.0 mg/dL    RDW 16.1 11.5 - 17.0 %    Platelet 324 130 - 400 x10(3)/mcL    MPV 9.6 7.4 - 10.4 fL    Neut % 67.8 %    Lymph % 21.0 %    Mono % 5.7 %    Eos % 4.3 %    Basophil % 0.3 %    Lymph # 2.18 0.6 - 4.6 x10(3)/mcL    Neut # 7.06 2.1 - 9.2 x10(3)/mcL    Mono # 0.59 0.1 - 1.3 x10(3)/mcL    Eos # 0.45 0 - 0.9 x10(3)/mcL    Baso # 0.03 0 - 0.2 x10(3)/mcL    IG# 0.09  (H) 0 - 0.04 x10(3)/mcL    IG% 0.9 %   T4, Free    Collection Time: 01/29/23 10:11 PM   Result Value Ref Range    Thyroxine Free <0.42 (L) 0.70 - 1.48 ng/dL   T3, Free (OLG)    Collection Time: 01/29/23 10:11 PM   Result Value Ref Range    T3 Free 1.35 (L) 1.57 - 3.91 pg/mL   Cortisol    Collection Time: 01/29/23 10:34 PM   Result Value Ref Range    Cortisol Level 13.9 ug/dL   Basic Metabolic Panel    Collection Time: 01/30/23  7:11 AM   Result Value Ref Range    Sodium Level 142 136 - 145 mmol/L    Potassium Level 3.3 (L) 3.5 - 5.1 mmol/L    Chloride 106 98 - 107 mmol/L    Carbon Dioxide 25 22 - 29 mmol/L    Glucose Level 126 (H) 74 - 100 mg/dL    Blood Urea Nitrogen 21.5 (H) 9.8 - 20.1 mg/dL    Creatinine 1.46 (H) 0.55 - 1.02 mg/dL    BUN/Creatinine Ratio 15     Calcium Level Total 8.5 8.4 - 10.2 mg/dL    Anion Gap 11.0 mEq/L    eGFR 43 mls/min/1.73/m2   Magnesium    Collection Time: 01/30/23  7:11 AM   Result Value Ref Range    Magnesium Level 1.80 1.60 - 2.60 mg/dL   CBC with Differential    Collection Time: 01/30/23  7:11 AM   Result Value Ref Range    WBC 11.0 4.5 - 11.5 x10(3)/mcL    RBC 4.30 4.20 - 5.40 x10(6)/mcL    Hgb 11.3 (L) 12.0 - 16.0 gm/dL    Hct 36.6 (L) 37.0 - 47.0 %    MCV 85.1 80.0 - 94.0 fL    MCH 26.3 pg    MCHC 30.9 (L) 33.0 - 36.0 mg/dL    RDW 16.3 11.5 - 17.0 %    Platelet 315 130 - 400 x10(3)/mcL    MPV 9.9 7.4 - 10.4 fL    Neut % 71.7 %    Lymph % 19.4 %    Mono % 5.4 %    Eos % 2.6 %    Basophil % 0.3 %    Lymph # 2.13 0.6 - 4.6 x10(3)/mcL    Neut # 7.88 2.1 - 9.2 x10(3)/mcL    Mono # 0.59 0.1 - 1.3 x10(3)/mcL    Eos # 0.29 0 - 0.9 x10(3)/mcL    Baso # 0.03 0 - 0.2 x10(3)/mcL    IG# 0.07 (H) 0 - 0.04 x10(3)/mcL    IG% 0.6 %   Vitamin B12    Collection Time: 01/30/23  7:11 AM   Result Value Ref Range    Vitamin B12 Level 226 213 - 816 pg/mL   Drug Screen, Urine    Collection Time: 01/30/23  2:46 PM   Result Value Ref Range    Amphetamines, Urine Positive (A) Negative    Barbituates, Urine  Negative Negative    Benzodiazepine, Urine Negative Negative    Cannabinoids, Urine Negative Negative    Cocaine, Urine Negative Negative    Opiates, Urine Negative Negative    Phencyclidine, Urine Negative Negative    pH, Urine 7.0 3.0 - 11.0    Specific Gravity, Urine Auto 1.020 1.001 - 1.035   Folate    Collection Time: 01/30/23  4:02 PM   Result Value Ref Range    Folate Level 5.2 (L) 7.0 - 31.4 ng/mL   SYPHILIS ANTIBODY (WITH REFLEX RPR)    Collection Time: 01/30/23  4:02 PM   Result Value Ref Range    Syphilis Antibody Nonreactive Nonreactive, Equivocal   Basic Metabolic Panel    Collection Time: 01/31/23  3:35 AM   Result Value Ref Range    Sodium Level 142 136 - 145 mmol/L    Potassium Level 3.3 (L) 3.5 - 5.1 mmol/L    Chloride 106 98 - 107 mmol/L    Carbon Dioxide 24 22 - 29 mmol/L    Glucose Level 122 (H) 74 - 100 mg/dL    Blood Urea Nitrogen 22.6 (H) 9.8 - 20.1 mg/dL    Creatinine 1.64 (H) 0.55 - 1.02 mg/dL    BUN/Creatinine Ratio 14     Calcium Level Total 8.8 8.4 - 10.2 mg/dL    Anion Gap 12.0 mEq/L    eGFR 37 mls/min/1.73/m2   Magnesium    Collection Time: 01/31/23  3:35 AM   Result Value Ref Range    Magnesium Level 2.30 1.60 - 2.60 mg/dL   Phosphorus    Collection Time: 01/31/23  3:35 AM   Result Value Ref Range    Phosphorus Level 4.2 2.3 - 4.7 mg/dL   TSH    Collection Time: 01/31/23  3:35 AM   Result Value Ref Range    Thyroid Stimulating Hormone 149.628 (H) 0.350 - 4.940 uIU/mL   Hemoglobin A1C    Collection Time: 01/31/23  3:35 AM   Result Value Ref Range    Hemoglobin A1c 6.9 <=7.0 %    Estimated Average Glucose 151.3 mg/dL   Lipid Panel    Collection Time: 01/31/23  3:35 AM   Result Value Ref Range    Cholesterol Total 280 (H) <=200 mg/dL    HDL Cholesterol 42 35 - 60 mg/dL    Triglyceride 163 (H) 37 - 140 mg/dL    Cholesterol/HDL Ratio 7 (H) 0 - 5    Very Low Density Lipoprotein 33     LDL Cholesterol 205.00 (H) 50.00 - 140.00 mg/dL   CBC with Differential    Collection Time: 01/31/23  3:35  AM   Result Value Ref Range    WBC 11.0 4.5 - 11.5 x10(3)/mcL    RBC 4.35 4.20 - 5.40 x10(6)/mcL    Hgb 11.3 (L) 12.0 - 16.0 gm/dL    Hct 37.5 37.0 - 47.0 %    MCV 86.2 80.0 - 94.0 fL    MCH 26.0 pg    MCHC 30.1 (L) 33.0 - 36.0 mg/dL    RDW 16.8 11.5 - 17.0 %    Platelet 329 130 - 400 x10(3)/mcL    MPV 10.7 (H) 7.4 - 10.4 fL    Neut % 63.1 %    Lymph % 26.3 %    Mono % 5.5 %    Eos % 3.8 %    Basophil % 0.5 %    Lymph # 2.88 0.6 - 4.6 x10(3)/mcL    Neut # 6.92 2.1 - 9.2 x10(3)/mcL    Mono # 0.60 0.1 - 1.3 x10(3)/mcL    Eos # 0.42 0 - 0.9 x10(3)/mcL    Baso # 0.05 0 - 0.2 x10(3)/mcL    IG# 0.09 (H) 0 - 0.04 x10(3)/mcL    IG% 0.8 %      No results found for: PHENYTOIN, PHENOBARB, VALPROATE, CBMZ        Psychiatric Mental Status Exam:  General Appearance: dressed in hospital garb, lying in bed, obese, disheveled  Arousal:  awake, distracted  Behavior: reluctant to participate, minimal responses, poor eye contact  Movements and Motor Activity: no abnormal involuntary movements noted; no tics, no tremors, no akathisia, no dystonia, no evidence of tardive dyskinesia; no psychomotor agitation or retardation  Orientation: oriented to person and place  Speech: delayed, +articulation errors  Mood: Depressed and Dysphoric  Affect: mood-congruent, constricted, dysphoric, tearful  Thought Process: disorganized  Associations: +derailment  Thought Content and Perceptions: + auditory hallucinations  Recent and Remote Memory: Unable to Assess; per interview/observation with patient  Attention and Concentration: poor effort given during testing; per interview/observation with patient  Fund of Knowledge: poor effort given during testing; based on history, vocabulary, fund of knowledge, syntax, grammar, and content  Insight: impaired; based on understanding of severity of illness and HPI  Judgment: questionable; based on patient's behavior and HPI      Patient Strengths:  Access to care      Patient Liabilities:  Medication  non-compliance, Complicated medical illness, Physical impairment, Depression, Lack of social support, Psychosis, and Chronic psychiatric illness      Discharge Criteria:  Improved mood and Improved thought process      ASSESSMENT/PLAN:   Problems Addressed/Diagnoses:  MOOD DISORDERS; Major Depressive Disorder, 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 No pertinent past medical history.     Recommendations  1.Start Sertraline 25 mg PO daily for depressive mood if tolerating oral medications  2. Add Risperdal 0.5 mg PO  BID for psychotic symptoms if tolerating oral medications  3. Attempt to obtain outside psychiatric records if available  4. Recommend In patient psych placement when medically stable:The patient is gravely disabled due to  psychiatric condition.        On this date, I have reviewed the medical history and Nursing Assessment, as well as records from referral source.  I have evaluated the mental status of the above named person and concur with the findings of all assessments.  I have provided medical direction for the development of the Treatment Plan.    I conclude that this patient meets admission criteria for inpatient treatment.  I certify that this patient poses a danger to self or others, or would otherwise be considered gravely disabled based on this assessment and/or provided collateral information.     I have provided medical direction for the development of the Treatment plan.  These services will be provided while this patient is under my care and will be based on an individualized plan of care.  The patient can demonstrate a reasonable expectation of improvement in his/her disorder as a result of the active treatment being provided.      aJckie Hinojosa

## 2023-01-31 NOTE — PROGRESS NOTES
[facility]  [facility] Medicine  Progress Note    Patient Name: Alysia Washington  MRN: [id number]  Patient Class: IP- Inpatient   Admission Date: 1/29/2023  Length of Stay: 0 days  Attending Physician: Thairy G Reyes, DO  Primary Care Provider: No primary care provider on file.        Subjective:     Principal Problem:Hypothyroidism        HPI:  Pt exhibiting selective mutism. No records available thus history obtained from record review. Pt presented to the ED complaining of redness, swelling and pain to her left foot that has worsened over the last few months. Boyfriend stated she has not been taking her medication for years and notes that she is been acting differently lately with sluggishness and sometimes seems confused.  In the ED patient with hypertensive urgency as high as 265/154, and profound hypothyroidism with a TSH of 138, T4 less than 0.42 and T3 1.35.  Patient with no concomitant hemodynamic instability or hyponatremia.  Low suspicion for myxedema coma given cortisol level. CT head found pronounced microvascular change and MRI showed left parietal lobe acute to subacute nonhemorrhagic infarct and old lacunar infarcts and severe microangiopathic edema.      Overview/Hospital Course:  Patient admitted for altered mentation secondary to toxic and metabolic causes. Also exhibiting selective mutism therefore she is pending a psychiatric evaluation.  Poorly controlled hypertension, titrating medications.      Interval History:  Min assist x 15 ft    Review of Systems   Reason unable to perform ROS: selective mutism.   Objective:     Vital Signs (Most Recent):  Temp: 98.1 °F (36.7 °C) (01/31/23 0300)  Pulse: 78 (01/31/23 1136)  Resp: 16 (01/31/23 0300)  BP: (!) 168/96 (01/31/23 1136)  SpO2: 95 % (01/31/23 1132)   Vital Signs (24h Range):  Temp:  [97.6 °F (36.4 °C)-98.4 °F (36.9 °C)] 98.1 °F (36.7 °C)  Pulse:  [70-92] 78  Resp:  [16] 16  SpO2:  [94 %-100 %] 95 %  BP:  (155-179)/(45-96) 168/96     Weight: 85.5 kg (188 lb 7.9 oz)  Body mass index is 30.42 kg/m².    Intake/Output Summary (Last 24 hours) at 1/31/2023 1459  Last data filed at 1/31/2023 0802  Gross per 24 hour   Intake 240 ml   Output 900 ml   Net -660 ml      Physical Exam  Constitutional:       General: She is not in acute distress.     Appearance: She is obese.   HENT:      Head: Normocephalic and atraumatic.      Nose: No congestion.   Cardiovascular:      Rate and Rhythm: Normal rate and regular rhythm.      Heart sounds: No murmur heard.  Pulmonary:      Effort: Pulmonary effort is normal.      Breath sounds: Normal breath sounds.   Abdominal:      General: Bowel sounds are normal. There is no distension.      Palpations: Abdomen is soft. There is no mass.      Tenderness: There is no abdominal tenderness.   Musculoskeletal:         General: Normal range of motion.   Skin:     General: Skin is warm.      Findings: No lesion or rash.   Neurological:      General: No focal deficit present.      Cranial Nerves: No cranial nerve deficit.   Psychiatric:         Attention and Perception: Attention normal.         Mood and Affect: Affect is flat.         Behavior: Behavior is slowed and withdrawn.       Significant Labs: All pertinent labs within the past 24 hours have been reviewed.    Significant Imaging: I have reviewed all pertinent imaging results/findings within the past 24 hours.      Assessment/Plan:      * Hypothyroidism  -elevated TSH, low T4   -started on levothyroxine 150 mcg   -repeat TSH Q 2 days for 4 days and then subsequently would need repeat TSH in 6 weeks    AMS (altered mental status)  -metabolic encephalopathy secondary to profound hypothyroidism, illicit drug use and folic acid deficiency  -record review reveals patient has had an inpatient psychiatric admission previously however no notes available  -pending psych eval  -pending HIV  -nonreactive syphilis, B12 wnl  -folate low-->  replete      Acute kidney insufficiency  -likely prerenal, IVF  -repeat BMP    Essential hypertension  -continue with lisinopril, hydrochlorothiazide, added nifedipine  -patient had permissive hypertension for 24 hours, okay to control now    Stroke  -CT head found pronounced microvascular change  -MRI found small parietal lobe acute to subacute nonhemorrhagic infarct  -aspirin, atorvastatin, BP control  -ascvd risk of 72.3%    Electrolyte abnormality  hypokalemia; replete as required       Illicit drug use  -UDS + for amphetamines         VTE Risk Mitigation (From admission, onward)         Ordered     enoxaparin injection 40 mg  Daily         01/30/23 0718     IP VTE HIGH RISK PATIENT  Once         01/30/23 0718     Place sequential compression device  Until discontinued         01/29/23 0381                Discharge Planning   LUIS ANTONIO:      Code Status: Full Code   Is the patient medically ready for discharge?:     Reason for patient still in hospital (select all that apply): Treatment and PT / OT recommendations  Discharge Plan A: Home with family, Home Health                  Thairy G Reyes, DO  Department of Hospital Medicine   [facility]

## 2023-01-31 NOTE — PT/OT/SLP PROGRESS
Name: Alysia Washington    : 1970 (52 y.o.)  MRN: [id number]           Patient Unavailable      Patient unable to be seen at this time secondary to:pt refused treatment this PM- will attempt again tomorrow- nurse informed

## 2023-01-31 NOTE — PT/OT/SLP PROGRESS
"Speech Language Pathology Treatment    Patient Name:  Alysia Washington   MRN:  [id number]  Admitting Diagnosis: Hypothyroidism    Recommendations:                 General Recommendations:  Cognitive-linguistic therapy  Diet recommendations:  low sodium, Liquid Diet Level: Thin liquids - IDDSI Level 0   Aspiration Precautions: HOB to 90 degrees   General Precautions: Standard, fall  Communication strategies:  provide increased time to answer    Subjective     Pt in bed upon SLP arrival w/ nurse present administering medications. Pt seen at bedside, alert and conversing slightly more initially as compared to eval on previous date.    Patient goals:      Pain/Comfort:       Respiratory Status: Room air    Objective:     Has the patient been evaluated by SLP for swallowing?      Keep patient NPO?     Current Respiratory Status:        Pt oriented to place and situation I'ly.  Pt unable to orient to month despite maxA and unable to orient to year despite verbal BC provided.  SLP presented common household situations and pt unable to provide solutions despite maxA provided. Pt stating, "I don't know" or not responding despite verbal cueing provided.  Frequent verbal and visual cueing required during attempt at bed mobility to sit up in bed. Decreased initiation and sequencing noted across functional task.  Pt tearful at end of session and unable to verbalize concerns. Nurse notified.     Cont SLP POC.    Assessment:     Alysia Washington is a 52 y.o. female with an SLP diagnosis of Cognitive-Linguistic Impairment.  She presents with impaired orientation, delayed recall, problem solving, sequencing, attention, and initiation. Pt would benefit from continued SLP services to target above stated deficits in order to promote a return to PLOF.    Goals:   Multidisciplinary Problems       SLP Goals          Problem: SLP    Goal Priority Disciplines Outcome   SLP Goal     SLP Ongoing, Progressing   Description: LTG: Pt will improve " cognitive linguistic skills to SBA level to ensure safety upon discharge home.    STG:  Pt will orient x4 [place].  Pt will attend to structured task for 3 minutes given 3 or less redirections/cues.  Pt will follow 3-step commands w/ 80% acc modA.  Pt will provide solutions to common situations w/ 90% acc [place].  Pt will complete 3-4 step sequencing tasks w/ 80% acc modA.                       Plan:     Patient to be seen:   (PRN)   Plan of Care expires:  02/10/23  Plan of Care reviewed with:   (unable as pt fell asleep towards end of eval.)   SLP Follow-Up:  Yes       Discharge recommendations:      Barriers to Discharge:  Decreased Care Giver Support    Time Tracking:     SLP Treatment Date:   1/31/23  Speech Start Time:  9:25  Speech Stop Time:  9:50     Speech Total Time (min):  25 min    Billable Minutes: Speech Therapy Individual 25 cognition    Taty Bella M.S., CCC-SLP   01/31/2023

## 2023-01-31 NOTE — SUBJECTIVE & OBJECTIVE
Interval History:  Min assist x 15 ft    Review of Systems   Reason unable to perform ROS: selective mutism.   Objective:     Vital Signs (Most Recent):  Temp: 98.1 °F (36.7 °C) (01/31/23 0300)  Pulse: 78 (01/31/23 1136)  Resp: 16 (01/31/23 0300)  BP: (!) 168/96 (01/31/23 1136)  SpO2: 95 % (01/31/23 1132)   Vital Signs (24h Range):  Temp:  [97.6 °F (36.4 °C)-98.4 °F (36.9 °C)] 98.1 °F (36.7 °C)  Pulse:  [70-92] 78  Resp:  [16] 16  SpO2:  [94 %-100 %] 95 %  BP: (155-179)/(45-96) 168/96     Weight: 85.5 kg (188 lb 7.9 oz)  Body mass index is 30.42 kg/m².    Intake/Output Summary (Last 24 hours) at 1/31/2023 1459  Last data filed at 1/31/2023 0802  Gross per 24 hour   Intake 240 ml   Output 900 ml   Net -660 ml      Physical Exam  Constitutional:       General: She is not in acute distress.     Appearance: She is obese.   HENT:      Head: Normocephalic and atraumatic.      Nose: No congestion.   Cardiovascular:      Rate and Rhythm: Normal rate and regular rhythm.      Heart sounds: No murmur heard.  Pulmonary:      Effort: Pulmonary effort is normal.      Breath sounds: Normal breath sounds.   Abdominal:      General: Bowel sounds are normal. There is no distension.      Palpations: Abdomen is soft. There is no mass.      Tenderness: There is no abdominal tenderness.   Musculoskeletal:         General: Normal range of motion.   Skin:     General: Skin is warm.      Findings: No lesion or rash.   Neurological:      General: No focal deficit present.      Cranial Nerves: No cranial nerve deficit.   Psychiatric:         Attention and Perception: Attention normal.         Mood and Affect: Affect is flat.         Behavior: Behavior is slowed and withdrawn.       Significant Labs: All pertinent labs within the past 24 hours have been reviewed.    Significant Imaging: I have reviewed all pertinent imaging results/findings within the past 24 hours.

## 2023-01-31 NOTE — ASSESSMENT & PLAN NOTE
-metabolic encephalopathy secondary to profound hypothyroidism, illicit drug use and folic acid deficiency  -record review reveals patient has had an inpatient psychiatric admission previously however no notes available  -pending psych eval  -pending HIV  -nonreactive syphilis, B12 wnl  -folate low--> replete

## 2023-02-01 PROBLEM — F32.9 MDD (MAJOR DEPRESSIVE DISORDER): Status: ACTIVE | Noted: 2023-02-01

## 2023-02-01 LAB
ANION GAP SERPL CALC-SCNC: 14 MEQ/L
BUN SERPL-MCNC: 21 MG/DL (ref 9.8–20.1)
CALCIUM SERPL-MCNC: 9.2 MG/DL (ref 8.4–10.2)
CHLORIDE SERPL-SCNC: 102 MMOL/L (ref 98–107)
CO2 SERPL-SCNC: 26 MMOL/L (ref 22–29)
CREAT SERPL-MCNC: 1.56 MG/DL (ref 0.55–1.02)
CREAT/UREA NIT SERPL: 13
GFR SERPLBLD CREATININE-BSD FMLA CKD-EPI: 39 MLS/MIN/1.73/M2
GLUCOSE SERPL-MCNC: 118 MG/DL (ref 74–100)
POTASSIUM SERPL-SCNC: 3.6 MMOL/L (ref 3.5–5.1)
SODIUM SERPL-SCNC: 142 MMOL/L (ref 136–145)

## 2023-02-01 PROCEDURE — 94760 N-INVAS EAR/PLS OXIMETRY 1: CPT

## 2023-02-01 PROCEDURE — 63600175 PHARM REV CODE 636 W HCPCS: Performed by: STUDENT IN AN ORGANIZED HEALTH CARE EDUCATION/TRAINING PROGRAM

## 2023-02-01 PROCEDURE — 97530 THERAPEUTIC ACTIVITIES: CPT

## 2023-02-01 PROCEDURE — 97116 GAIT TRAINING THERAPY: CPT

## 2023-02-01 PROCEDURE — 97535 SELF CARE MNGMENT TRAINING: CPT

## 2023-02-01 PROCEDURE — 11000001 HC ACUTE MED/SURG PRIVATE ROOM

## 2023-02-01 PROCEDURE — 80048 BASIC METABOLIC PNL TOTAL CA: CPT | Performed by: STUDENT IN AN ORGANIZED HEALTH CARE EDUCATION/TRAINING PROGRAM

## 2023-02-01 PROCEDURE — 25000003 PHARM REV CODE 250: Performed by: STUDENT IN AN ORGANIZED HEALTH CARE EDUCATION/TRAINING PROGRAM

## 2023-02-01 RX ORDER — RISPERIDONE 1 MG/1
1 TABLET ORAL 2 TIMES DAILY
Status: DISCONTINUED | OUTPATIENT
Start: 2023-02-01 | End: 2023-02-02

## 2023-02-01 RX ORDER — SERTRALINE HYDROCHLORIDE 25 MG/1
25 TABLET, FILM COATED ORAL DAILY
Status: DISCONTINUED | OUTPATIENT
Start: 2023-02-01 | End: 2023-02-07 | Stop reason: HOSPADM

## 2023-02-01 RX ORDER — LABETALOL 200 MG/1
200 TABLET, FILM COATED ORAL EVERY 12 HOURS
Status: DISCONTINUED | OUTPATIENT
Start: 2023-02-01 | End: 2023-02-07 | Stop reason: HOSPADM

## 2023-02-01 RX ADMIN — RISPERIDONE 1 MG: 1 TABLET ORAL at 08:02

## 2023-02-01 RX ADMIN — LISINOPRIL 20 MG: 10 TABLET ORAL at 08:02

## 2023-02-01 RX ADMIN — ENOXAPARIN SODIUM 40 MG: 40 INJECTION SUBCUTANEOUS at 04:02

## 2023-02-01 RX ADMIN — RISPERIDONE 1 MG: 1 TABLET ORAL at 09:02

## 2023-02-01 RX ADMIN — LABETALOL HYDROCHLORIDE 200 MG: 200 TABLET, FILM COATED ORAL at 09:02

## 2023-02-01 RX ADMIN — FAMOTIDINE 20 MG: 20 TABLET, FILM COATED ORAL at 08:02

## 2023-02-01 RX ADMIN — LEVOTHYROXINE SODIUM 150 MCG: 0.1 TABLET ORAL at 05:02

## 2023-02-01 RX ADMIN — LABETALOL HYDROCHLORIDE 200 MG: 200 TABLET, FILM COATED ORAL at 08:02

## 2023-02-01 RX ADMIN — POTASSIUM CHLORIDE 40 MEQ: 1500 TABLET, EXTENDED RELEASE ORAL at 08:02

## 2023-02-01 RX ADMIN — FOLIC ACID 1 MG: 1 TABLET ORAL at 08:02

## 2023-02-01 RX ADMIN — ASPIRIN 81 MG: 81 TABLET, COATED ORAL at 08:02

## 2023-02-01 RX ADMIN — ATORVASTATIN CALCIUM 40 MG: 40 TABLET, FILM COATED ORAL at 08:02

## 2023-02-01 RX ADMIN — SERTRALINE 25 MG: 25 TABLET, FILM COATED ORAL at 09:02

## 2023-02-01 RX ADMIN — NIFEDIPINE 60 MG: 30 TABLET, FILM COATED, EXTENDED RELEASE ORAL at 09:02

## 2023-02-01 NOTE — PT/OT/SLP PROGRESS
Occupational Therapy  Treatment    Name: Alysia Washington    : 1970 (52 y.o.)  MRN: [id number]           TREATMENT SUMMARY AND RECOMMENDATIONS:      OT Date of Treatment: 23  OT Start Time: 1320  OT Stop Time: 1335  OT Total Time (min): 15 min      Subjective Assessment:   No complaints  Lethargic    Awake, alert, cooperative  Impulsive    Uncooperative   Flat affect    Agitated  c/o pain    Appropriate  c/o fatigue    Confused x Treated at bedside    x Emotionally labile  Treated in gym/dept.      Other:        Therapy Precautions:    Cognitive deficits  Spinal precautions    Collar - hard  Sternal precautions    Collar - soft   TLSO   x Fall risk  LSO    Hip precautions - posterior  Knee immobilizer    Hip precautions - anterior  WBAT    Impaired communication  Partial weightbearing    Oxygen  TTWB    PEG tube  NWB    Visual deficits      Hearing deficits   Other:        Treatment Objectives:     Mobility Training:    Mobility task Assist level Comments    Bed mobility Max Ax2 Supine<EOB; assistance to scoot BLE of EOB and transition trunk into upright position    Transfer     Sit to stands transitions     Functional mobility     Sitting balance Poor Mod-max A to maintain sitting balance   Standing balance      Other:        ADL Training:    ADL Assist level Comments    Feeding     Grooming/hygiene     Bathing Max A Sponge bath sitting EOB; assistance to wash BLE and BUEs; Pt washed her face and chest   Upper body dressing     Lower body dressing     Toileting     Toilet transfer     Adaptive equipment training     Other:           Therapeutic Exercise:   Exercise Sets Reps Comments                               Additional Comments:      Assessment: Patient tolerated session poor. Pt did not engage with OTR or tech. Pt required mod verbal cues to initiate washing face and chest. OTR encouraged pt to wash more of her body, but pt did not respond. Pt very emotional labile as evident of crying at the end of  the session.     OT Plan: Continue with POC  Revisions made to plan of care: No    GOALS:   Multidisciplinary Problems       Occupational Therapy Goals          Problem: Occupational Therapy    Goal Priority Disciplines Outcome Interventions   Occupational Therapy Goal     OT, PT/OT Ongoing, Progressing    Description: Goals to be met by: 2/6/23     Patient will increase functional independence with ADLs by performing:    LE Dressing with Minimal Assistance.  Grooming while standing at sink with Stand-by Assistance.  Toileting from toilet with Minimal Assistance for hygiene and clothing management.   Bathing from  shower chair/bench with Minimal Assistance.  Toilet transfer to toilet with Contact Guard Assistance.  Increased functional strength to 5/5 for ADL.                         Skilled OT Minutes Provided: 15  Communication with Treatment Team:     Equipment recommendations:       At end of treatment, patient remained:   Up in chair     Up in wheelchair in room   x In bed   x With alarm activated   x Bed rails up   x Call bell in reach     Family/friends present    Restraints secured properly    In bathroom with CNA/RN notified    In gym with PT/PTA/tech    Nurse aware    Other:

## 2023-02-01 NOTE — PLAN OF CARE
Problem: Adult Inpatient Plan of Care  Goal: Plan of Care Review  Outcome: Ongoing, Progressing  Goal: Patient-Specific Goal (Individualized)  Outcome: Ongoing, Progressing  Flowsheets (Taken 2/1/2023 1557)  Anxieties, Fears or Concerns: length of hospital stay  Individualized Care Needs: monitoring bp and participating in therapy  Goal: Absence of Hospital-Acquired Illness or Injury  Outcome: Ongoing, Progressing  Intervention: Identify and Manage Fall Risk  Flowsheets (Taken 2/1/2023 1557)  Safety Promotion/Fall Prevention:   assistive device/personal item within reach   bed alarm set   nonskid shoes/socks when out of bed   side rails raised x 2  Intervention: Prevent Skin Injury  Flowsheets (Taken 2/1/2023 1557)  Body Position: supine  Skin Protection:   adhesive use limited   incontinence pads utilized   tubing/devices free from skin contact  Intervention: Prevent and Manage VTE (Venous Thromboembolism) Risk  Flowsheets (Taken 2/1/2023 1557)  Activity Management: Rolling - L1  VTE Prevention/Management: bleeding precations maintained  Goal: Optimal Comfort and Wellbeing  Outcome: Ongoing, Progressing  Goal: Readiness for Transition of Care  Outcome: Ongoing, Progressing     Problem: Skin Injury Risk Increased  Goal: Skin Health and Integrity  Outcome: Ongoing, Progressing     Problem: Fall Injury Risk  Goal: Absence of Fall and Fall-Related Injury  Outcome: Ongoing, Progressing  Intervention: Identify and Manage Contributors  Flowsheets (Taken 2/1/2023 1557)  Medication Review/Management: medications reviewed     Problem: Infection  Goal: Absence of Infection Signs and Symptoms  Outcome: Ongoing, Progressing  Intervention: Prevent or Manage Infection  Flowsheets (Taken 2/1/2023 1557)  Isolation Precautions: protective     Problem: Fluid and Electrolyte Imbalance (Acute Kidney Injury/Impairment)  Goal: Fluid and Electrolyte Balance  Outcome: Ongoing, Progressing     Problem: Oral Intake Inadequate (Acute Kidney  Injury/Impairment)  Goal: Optimal Nutrition Intake  Outcome: Ongoing, Progressing  Intervention: Promote and Optimize Nutrition  Flowsheets (Taken 2/1/2023 1557)  Oral Nutrition Promotion: safe use of adaptive equipment encouraged     Problem: Renal Function Impairment (Acute Kidney Injury/Impairment)  Goal: Effective Renal Function  Outcome: Ongoing, Progressing  Intervention: Monitor and Support Renal Function  Flowsheets (Taken 2/1/2023 1557)  Medication Review/Management: medications reviewed

## 2023-02-01 NOTE — PT/OT/SLP PROGRESS
"Name: Alysia Washington    : 1970 (52 y.o.)  MRN: [id number]           Patient Unavailable      Patient unable to be seen at this time secondary to: pt in gerichair w/ eyes closed upon SLP arrival. Pt shaking head, "no" following SLP introduction. Will continue POC as able.          "

## 2023-02-01 NOTE — PLAN OF CARE
Patient agreeable to inpatient psych placement. Referrals sent to [facility], [facility], [facility], [facility] and [facility]

## 2023-02-01 NOTE — PT/OT/SLP PROGRESS
Physical Therapy Treatment Note           Name: Alysia Washington    : 1970 (52 y.o.)  MRN: [id number]           TREATMENT SUMMARY AND RECOMMENDATIONS:    PT Received On: 23  PT Start Time: 910     PT Stop Time: 933  PT Total Time (min): 23 min     Subjective Assessment:   No complaints  Lethargic   x Awake, alert, cooperative  Uncooperative    Agitated  c/o pain    Appropriate x c/o fatigue    Confused x Treated at bedside     Emotionally labile  Treated in gym/dept.    Impulsive  Other:    Flat affect       Therapy Precautions:    Cognitive deficits  Spinal precautions    Collar - hard  Sternal precautions    Collar - soft   TLSO   x Fall risk  LSO    Hip precautions - posterior  Knee immobilizer    Hip precautions - anterior  WBAT    Impaired communication  Partial weightbearing    Oxygen  TTWB    PEG tube  NWB    Visual deficits  Other:    Hearing deficits          Treatment Objectives:     Mobility Training:   Assist level Comments    Bed mobility MIN A Rolling and supine<>Sit   Transfer MIN A Stand pivot using RW   Gait MIN A  X 20 feet using RW   Sit to stand transitions MIN A Using RW from recliner and EOB   Sitting balance     Standing balance      Wheelchair mobility     Car transfer     Other:          Therapeutic Exercise:   Exercise Sets Reps Comments                               Additional Comments:  PT emotional and taking extra time to respond. Verbal and tactile cues needed for proper activity completion and attention to task.     Assessment: Patient tolerated session Fair.    PT Plan: Continue per POC  Revisions made to plan of care: No    GOALS:   Multidisciplinary Problems       Physical Therapy Goals          Problem: Physical Therapy    Goal Priority Disciplines Outcome Goal Variances Interventions   Physical Therapy Goal     PT, PT/OT Ongoing, Progressing     Description: Goals to be met by: Discharge     Patient will increase functional independence with mobility by  performin. Supine to sit with Modified [place] and Sit to supine with Modified [place]  2. Sit to stand transfer with Modified [place]  3. Bed to chair transfer with Modified [place] using Rolling Walker  4. Gait  x 175 feet with Modified [place] using Rolling Walker.   5. Increased functional strength to 5/5 for LLE.                         Skilled PT Minutes Provided: 23   Communication with Treatment Team:     Equipment recommendations:       At end of treatment, patient remained:  x Up in chair     Up in wheelchair in room    In bed   x With alarm activated    Bed rails up   x Call bell in reach     Family/friends present    Restraints secured properly    In bathroom with CNA/RN notified   x Nurse aware    In gym with therapist/tech    Other:

## 2023-02-01 NOTE — PROGRESS NOTES
Upon arrival to room, pt was sitting up in the recliner. Pt stated she didn't want to get up. Will attempt to continue with POC in PM.

## 2023-02-01 NOTE — PT/OT/SLP PROGRESS
Physical Therapy Treatment Note           Name: Alysia Washington    : 1970 (52 y.o.)  MRN: [id number]           TREATMENT SUMMARY AND RECOMMENDATIONS:    PT Received On: 23  PT Start Time: 1255     PT Stop Time: 1310  PT Total Time (min): 15 min     Subjective Assessment:   No complaints  Lethargic   x Awake, alert, cooperative  Uncooperative    Agitated  c/o pain    Appropriate x c/o fatigue    Confused x Treated at bedside     Emotionally labile  Treated in gym/dept.    Impulsive  Other:    Flat affect       Therapy Precautions:    Cognitive deficits  Spinal precautions    Collar - hard  Sternal precautions    Collar - soft   TLSO   x Fall risk  LSO    Hip precautions - posterior  Knee immobilizer    Hip precautions - anterior  WBAT    Impaired communication  Partial weightbearing    Oxygen  TTWB    PEG tube  NWB    Visual deficits  Other:    Hearing deficits          Treatment Objectives:     Mobility Training:   Assist level Comments    Bed mobility SBA Sit>supine and rolling R   Transfer MAX A Stand pivot tried using RW, but was unable. PT completed with HHA and bed rail   Gait MOD A  X 5 feet using RW   Sit to stand transitions MIN/MOD A Using RW from recliner and EOB   Sitting balance     Standing balance      Wheelchair mobility     Car transfer     Other:          Therapeutic Exercise:   Exercise Sets Reps Comments                               Additional Comments:  PT more emotional this afternoon and demonstrating poor movement initiation. Assessment: Patient tolerated session Fair.    PT Plan: Continue per POC  Revisions made to plan of care: No    GOALS:   Multidisciplinary Problems       Physical Therapy Goals          Problem: Physical Therapy    Goal Priority Disciplines Outcome Goal Variances Interventions   Physical Therapy Goal     PT, PT/OT Ongoing, Progressing     Description: Goals to be met by: Discharge     Patient will increase functional independence with mobility by  performin. Supine to sit with Modified [place] and Sit to supine with Modified [place]  2. Sit to stand transfer with Modified [place]  3. Bed to chair transfer with Modified [place] using Rolling Walker  4. Gait  x 175 feet with Modified [place] using Rolling Walker.   5. Increased functional strength to 5/5 for LLE.                         Skilled PT Minutes Provided: 15   Communication with Treatment Team:     Equipment recommendations:       At end of treatment, patient remained:   Up in chair     Up in wheelchair in room   x In bed   x With alarm activated   x Bed rails up   x Call bell in reach     Family/friends present    Restraints secured properly    In bathroom with CNA/RN notified    Nurse aware    In gym with therapist/tech    Other:

## 2023-02-02 LAB — TSH SERPL-ACNC: 135.94 UIU/ML (ref 0.35–4.94)

## 2023-02-02 PROCEDURE — 25000003 PHARM REV CODE 250: Performed by: STUDENT IN AN ORGANIZED HEALTH CARE EDUCATION/TRAINING PROGRAM

## 2023-02-02 PROCEDURE — 63600175 PHARM REV CODE 636 W HCPCS: Performed by: STUDENT IN AN ORGANIZED HEALTH CARE EDUCATION/TRAINING PROGRAM

## 2023-02-02 PROCEDURE — 92610 EVALUATE SWALLOWING FUNCTION: CPT

## 2023-02-02 PROCEDURE — 94760 N-INVAS EAR/PLS OXIMETRY 1: CPT

## 2023-02-02 PROCEDURE — 11000001 HC ACUTE MED/SURG PRIVATE ROOM

## 2023-02-02 PROCEDURE — 84443 ASSAY THYROID STIM HORMONE: CPT | Performed by: STUDENT IN AN ORGANIZED HEALTH CARE EDUCATION/TRAINING PROGRAM

## 2023-02-02 RX ORDER — RISPERIDONE 0.5 MG/1
0.5 TABLET ORAL 2 TIMES DAILY
Status: DISCONTINUED | OUTPATIENT
Start: 2023-02-02 | End: 2023-02-07 | Stop reason: HOSPADM

## 2023-02-02 RX ADMIN — LISINOPRIL 20 MG: 10 TABLET ORAL at 09:02

## 2023-02-02 RX ADMIN — RISPERIDONE 1 MG: 1 TABLET ORAL at 09:02

## 2023-02-02 RX ADMIN — NIFEDIPINE 60 MG: 30 TABLET, FILM COATED, EXTENDED RELEASE ORAL at 09:02

## 2023-02-02 RX ADMIN — ASPIRIN 81 MG: 81 TABLET, COATED ORAL at 09:02

## 2023-02-02 RX ADMIN — ATORVASTATIN CALCIUM 40 MG: 40 TABLET, FILM COATED ORAL at 08:02

## 2023-02-02 RX ADMIN — SERTRALINE 25 MG: 25 TABLET, FILM COATED ORAL at 09:02

## 2023-02-02 RX ADMIN — FOLIC ACID 1 MG: 1 TABLET ORAL at 09:02

## 2023-02-02 RX ADMIN — LEVOTHYROXINE SODIUM 150 MCG: 0.1 TABLET ORAL at 05:02

## 2023-02-02 RX ADMIN — LABETALOL HYDROCHLORIDE 200 MG: 200 TABLET, FILM COATED ORAL at 08:02

## 2023-02-02 RX ADMIN — FAMOTIDINE 20 MG: 20 TABLET, FILM COATED ORAL at 09:02

## 2023-02-02 RX ADMIN — ENOXAPARIN SODIUM 40 MG: 40 INJECTION SUBCUTANEOUS at 04:02

## 2023-02-02 RX ADMIN — RISPERIDONE 0.5 MG: 0.5 TABLET ORAL at 08:02

## 2023-02-02 RX ADMIN — LABETALOL HYDROCHLORIDE 200 MG: 200 TABLET, FILM COATED ORAL at 09:02

## 2023-02-02 NOTE — PROGRESS NOTES
[facility]  [facility] Medicine  Progress Note    Patient Name: Alysia Washington  MRN: [id number]  Patient Class: IP- Inpatient   Admission Date: 1/29/2023  Length of Stay: 1 days  Attending Physician: Thairy G Reyes, DO  Primary Care Provider: No primary care provider on file.        Subjective:     Principal Problem:Hypothyroidism        HPI:  Pt exhibiting selective mutism. No records available thus history obtained from record review. Pt presented to the ED complaining of redness, swelling and pain to her left foot that has worsened over the last few months. Boyfriend stated she has not been taking her medication for years and notes that she is been acting differently lately with sluggishness and sometimes seems confused.  In the ED patient with hypertensive urgency as high as 265/154, and profound hypothyroidism with a TSH of 138, T4 less than 0.42 and T3 1.35.  Patient with no concomitant hemodynamic instability or hyponatremia.  Low suspicion for myxedema coma given cortisol level. CT head found pronounced microvascular change and MRI showed left parietal lobe acute to subacute nonhemorrhagic infarct and old lacunar infarcts and severe microangiopathic edema.      Overview/Hospital Course:  Patient admitted for altered mentation secondary to toxic and metabolic causes. She was started on levothyroxine for profound hypothyrodism. Also exhibiting selective mutism. She was evaluated by tele psych who recommend risperidone, sertraline and inpatient psych. Poorly controlled hypertension, titrating medications.      Interval History:  Min assist x 15 ft    Review of Systems   Reason unable to perform ROS: selective mutism.   Objective:     Vital Signs (Most Recent):  Temp: 98.3 °F (36.8 °C) (02/01/23 1508)  Pulse: 67 (02/01/23 1508)  Resp: 20 (02/01/23 0400)  BP: 131/68 (02/01/23 1508)  SpO2: 99 % (02/01/23 1150)   Vital Signs (24h Range):  Temp:  [97.5 °F (36.4 °C)-98.4 °F (36.9 °C)] 98.3  °F (36.8 °C)  Pulse:  [67-85] 67  Resp:  [18-20] 20  SpO2:  [95 %-100 %] 99 %  BP: (129-179)/() 131/68     Weight: 85.5 kg (188 lb 7.9 oz)  Body mass index is 30.42 kg/m².    Intake/Output Summary (Last 24 hours) at 2/1/2023 1817  Last data filed at 2/1/2023 1427  Gross per 24 hour   Intake 600 ml   Output 1750 ml   Net -1150 ml        Physical Exam  Constitutional:       General: She is not in acute distress.     Appearance: She is obese.   HENT:      Head: Normocephalic and atraumatic.      Nose: No congestion.   Cardiovascular:      Rate and Rhythm: Normal rate and regular rhythm.      Heart sounds: No murmur heard.  Pulmonary:      Effort: Pulmonary effort is normal.      Breath sounds: Normal breath sounds.   Abdominal:      General: Bowel sounds are normal. There is no distension.      Palpations: Abdomen is soft. There is no mass.      Tenderness: There is no abdominal tenderness.   Musculoskeletal:         General: Normal range of motion.   Skin:     General: Skin is warm.      Findings: No lesion or rash.   Neurological:      General: No focal deficit present.      Cranial Nerves: No cranial nerve deficit.   Psychiatric:         Attention and Perception: Attention normal.         Mood and Affect: Affect is flat.         Behavior: Behavior is slowed and withdrawn.       Significant Labs: All pertinent labs within the past 24 hours have been reviewed.    Significant Imaging: I have reviewed all pertinent imaging results/findings within the past 24 hours.      Assessment/Plan:      * Hypothyroidism  -elevated TSH, low T4   -started on levothyroxine 150 mcg   -repeat TSH Q 2 days for 4 days and then subsequently would need repeat TSH in 6 weeks    AMS (altered mental status)  -metabolic encephalopathy secondary to profound hypothyroidism, illicit drug use and folic acid deficiency  -record review reveals patient has had an inpatient psychiatric admission previously however no notes available  -tele psych  recommends inpatient psych  -nonreactive syphilis/HIV, B12 wnl  -folate low--> replete  -risperidone, sertraline      Acute kidney insufficiency  -baseline CKD unknown, suspect pt with baseline CKD III    Essential hypertension  -continue with lisinopril, hydrochlorothiazide, added nifedipine    Stroke  -CT head found pronounced microvascular change  -MRI found small parietal lobe acute to subacute nonhemorrhagic infarct  -aspirin, atorvastatin, BP control  -ascvd risk of 72.3%    Electrolyte abnormality  hypokalemia; replete as required       Illicit drug use  -UDS + for amphetamines       MDD (major depressive disorder)  -risperidone, sertraline  -Psych recommended inpatient psychiatry         VTE Risk Mitigation (From admission, onward)         Ordered     enoxaparin injection 40 mg  Daily         01/30/23 0718     IP VTE HIGH RISK PATIENT  Once         01/30/23 0718     Place sequential compression device  Until discontinued         01/29/23 8298                Discharge Planning   LUIS ANTONIO:      Code Status: Full Code   Is the patient medically ready for discharge?:     Reason for patient still in hospital (select all that apply): Treatment, PT / OT recommendations and Pending disposition  Discharge Plan A: Home with family, Home Health                  Thairy G Reyes, DO  Department of Hospital Medicine   [facility]

## 2023-02-02 NOTE — ASSESSMENT & PLAN NOTE
-metabolic encephalopathy secondary to profound hypothyroidism, illicit drug use and folic acid deficiency  -record review reveals patient has had an inpatient psychiatric admission previously however no notes available  -tele psych recommends inpatient psych  -nonreactive syphilis/HIV, B12 wnl  -folate low--> replete  -risperidone, sertraline

## 2023-02-02 NOTE — PROGRESS NOTES
[facility]  [facility] Medicine  Progress Note    Patient Name: Alysia Washington  MRN: [id number]  Patient Class: IP- Inpatient   Admission Date: 1/29/2023  Length of Stay: 2 days  Attending Physician: Thairy G Reyes, DO  Primary Care Provider: No primary care provider on file.        Subjective:     Principal Problem:Hypothyroidism        HPI:  Pt exhibiting selective mutism. No records available thus history obtained from record review. Pt presented to the ED complaining of redness, swelling and pain to her left foot that has worsened over the last few months. Boyfriend stated she has not been taking her medication for years and notes that she is been acting differently lately with sluggishness and sometimes seems confused.  In the ED patient with hypertensive urgency as high as 265/154, and profound hypothyroidism with a TSH of 138, T4 less than 0.42 and T3 1.35.  Patient with no concomitant hemodynamic instability or hyponatremia.  Low suspicion for myxedema coma given cortisol level. CT head found pronounced microvascular change and MRI showed left parietal lobe acute to subacute nonhemorrhagic infarct and old lacunar infarcts and severe microangiopathic edema.      Overview/Hospital Course:  Patient admitted for altered mentation secondary to toxic and metabolic causes. She was started on levothyroxine for profound hypothyrodism. Also exhibiting selective mutism. She was evaluated by tele psych who recommend risperidone, sertraline and inpatient psych. Dose of risperidone decreased 2/2 due to somnolence. Poorly controlled hypertension, titrating medications.      Interval History:  Min assist x 15 ft. Pt has been refusing therapies.     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   Objective:     Vital Signs (Most Recent):  Temp: 97.8 °F (36.6 °C) (02/02/23 1101)  Pulse: 66 (02/02/23 1101)  Resp: 18 (02/02/23 0740)  BP: 116/80 (02/02/23 1101)  SpO2: 97 %  (02/02/23 1101)   Vital Signs (24h Range):  Temp:  [97.8 °F (36.6 °C)-98.5 °F (36.9 °C)] 97.8 °F (36.6 °C)  Pulse:  [66-98] 66  Resp:  [18-19] 18  SpO2:  [93 %-98 %] 97 %  BP: (116-158)/(67-87) 116/80     Weight: 85.5 kg (188 lb 7.9 oz)  Body mass index is 30.42 kg/m².    Intake/Output Summary (Last 24 hours) at 2/2/2023 1457  Last data filed at 2/2/2023 0902  Gross per 24 hour   Intake 720 ml   Output 1450 ml   Net -730 ml        Physical Exam  Constitutional:       General: She is not in acute distress.     Appearance: She is obese.   HENT:      Head: Normocephalic and atraumatic.      Nose: No congestion.   Cardiovascular:      Rate and Rhythm: Normal rate and regular rhythm.      Heart sounds: No murmur heard.  Pulmonary:      Effort: Pulmonary effort is normal.      Breath sounds: Normal breath sounds.   Abdominal:      General: Bowel sounds are normal. There is no distension.      Palpations: Abdomen is soft. There is no mass.      Tenderness: There is no abdominal tenderness.   Musculoskeletal:         General: Normal range of motion.   Skin:     General: Skin is warm.      Findings: No lesion or rash.   Neurological:      General: No focal deficit present.      Cranial Nerves: No cranial nerve deficit.   Psychiatric:         Attention and Perception: Attention normal.         Mood and Affect: Affect is flat.         Behavior: Behavior is slowed and withdrawn.       Significant Labs: All pertinent labs within the past 24 hours have been reviewed.    Significant Imaging: I have reviewed all pertinent imaging results/findings within the past 24 hours.      Assessment/Plan:      * Hypothyroidism  -elevated TSH, low T4   -started on levothyroxine 150 mcg   -will need repeat TSH in 6 weeks from 1/30 (on or about 3/13/23)    AMS (altered mental status)  -metabolic encephalopathy secondary to profound hypothyroidism, illicit drug use and folic acid deficiency  -record review reveals patient has had an inpatient  psychiatric admission previously however no notes available  -tele psych recommends inpatient psych  -nonreactive syphilis/HIV, B12 wnl  -folate low--> replete  -risperidone, sertraline      Acute kidney insufficiency  -baseline CKD unknown, suspect pt with baseline CKD III    Essential hypertension  -continue with lisinopril, hydrochlorothiazide, added nifedipine    Stroke  -CT head found pronounced microvascular change  -MRI found small parietal lobe acute to subacute nonhemorrhagic infarct  -aspirin, atorvastatin, BP control  -ascvd risk of 72.3%    Electrolyte abnormality  hypokalemia; replete as required       Illicit drug use  -UDS + for amphetamines       MDD (major depressive disorder)  -risperidone, sertraline  -Psych recommended inpatient psychiatry         VTE Risk Mitigation (From admission, onward)         Ordered     enoxaparin injection 40 mg  Daily         01/30/23 0718     IP VTE HIGH RISK PATIENT  Once         01/30/23 0718     Place sequential compression device  Until discontinued         01/29/23 8872                Discharge Planning   LUIS ANTONIO:      Code Status: Full Code   Is the patient medically ready for discharge?:     Reason for patient still in hospital (select all that apply): Treatment and Pending disposition  Discharge Plan A: Psychiatric hospital   Discharge Delays: None known at this time              Thairy G Reyes, DO  Department of Hospital Medicine   [facility]

## 2023-02-02 NOTE — PLAN OF CARE
[facility]  Discharge Reassessment    Primary Care Provider: No primary care provider on file.    Expected Discharge Date:     Reassessment (most recent)       Discharge Reassessment - 02/02/23 1408          Discharge Reassessment    Assessment Type Discharge Planning Reassessment     Did the patient's condition or plan change since previous assessment? No     Communicated LUIS ANTONIO with patient/caregiver Date not available/Unable to determine     Discharge Plan A Psychiatric hospital     DME Needed Upon Discharge  none     Discharge Barriers Identified None        Post-Acute Status    Post-Acute Authorization Placement     Post-Acute Placement Status Referrals Sent     Discharge Delays None known at this time

## 2023-02-02 NOTE — ASSESSMENT & PLAN NOTE
-elevated TSH, low T4   -started on levothyroxine 150 mcg   -will need repeat TSH in 6 weeks from 1/30 (on or about 3/13/23)

## 2023-02-02 NOTE — PROGRESS NOTES
Unable to arouse pt for PT participation, pt would open her eyes but then close them back and not respond. CNA reported pt was not alert during changing tasks and did not help or move at all. PT to attempt again tomorrow.

## 2023-02-02 NOTE — PLAN OF CARE
Sent referral to [facility] and [facility]. [place] of choice on file

## 2023-02-02 NOTE — PROGRESS NOTES
Upon arrival to room pt was in bed sleeping. OTR was unable to arouse pt. Will attempt to continue with POC in PM.

## 2023-02-02 NOTE — PLAN OF CARE
Problem: Adult Inpatient Plan of Care  Goal: Plan of Care Review  2/1/2023 2216 by Katie Osorio RN  Outcome: Ongoing, Progressing  Flowsheets (Taken 2/1/2023 2216)  Plan of Care Reviewed With: patient  2/1/2023 2216 by Katie Osorio RN  Outcome: Ongoing, Progressing  Goal: Patient-Specific Goal (Individualized)  2/1/2023 2216 by Katie Osorio RN  Outcome: Ongoing, Progressing  Flowsheets (Taken 2/1/2023 2216)  Anxieties, Fears or Concerns: none  Individualized Care Needs: neuro checks and monitor b/p  2/1/2023 2216 by Katie Osorio RN  Outcome: Ongoing, Progressing     Problem: Fall Injury Risk  Goal: Absence of Fall and Fall-Related Injury  Outcome: Ongoing, Progressing  Intervention: Identify and Manage Contributors  Flowsheets (Taken 2/1/2023 2216)  Medication Review/Management: medications reviewed

## 2023-02-02 NOTE — PT/OT/SLP EVAL
Speech Language Pathology Evaluation  Bedside Swallow    Patient Name:  Alysia Washington   MRN:  [id number]  Admitting Diagnosis: Hypothyroidism    Recommendations:                 General Recommendations:  Cognitive-linguistic therapy  Diet recommendations:  Regular, Thin liquids - IDDSI Level 0   Aspiration Precautions: Feed only when awake/alert, HOB to 90 degrees, and Meds whole 1 at a time   General Precautions: Standard, fall  Communication strategies:  none    History:     History reviewed. No pertinent past medical history.    History reviewed. No pertinent surgical history.    Social History: Patient lives with self.    Prior Intubation HX:      Modified Barium Swallow:     Chest X-Rays:     Prior diet: regular/thin    Occupation/hobbies/homemaking: pt unable to state. No family present.    Subjective     Pt in bed w/ eyes closed upon SLP arrival.    Patient goals:      Pain/Comfort:       Respiratory Status: Room air    Objective:     Oral Musculature Evaluation  Voice Prior to PO Intake: WNL    Bedside Swallow Eval:   Consistencies Assessed:  Thin liquids via straw    Meds whole 1-2 AAT    Oral Phase:   Slow oral transit time    Pharyngeal Phase:   no overt clinical signs/symptoms of pharyngeal dysphagia    Compensatory Strategies  None    Treatment: skilled services for dysphagia not warranted at this time    Assessment:     Alysia Washington is a 52 y.o. female with an SLP diagnosis of  n/a .  SLP has been seeing pt for cognitive-linguistic deficits since admission. Night shift RN reports pt spitting out meds or holding in oral cavity, therefore SLP consulted to eval for dysphagia this date. CNA reports pt consumed ~50% of breakfast this AM w/o difficulty and reported good initiation following set up assistance. Pt's nurse today stated that pt would not wake up this AM for morning meds. SLP and nurse present for eval this AM to assess swallowing. Pt remained awake across eval. SLP and nurse providing constant  encouragement and education. SLP facilitated meds whole 1 AAT following increased time and verbal and tactile cueing. [employer] utilized in between meds vs water. Increased time was needed and encouragement throughout, but pt did demonstrate oral acceptance for meds whole 1 AAT. Pt demonstrated good toleration across. Multiple straw sips of liquid to ensure meds were swallowed. Pt w/ minimal effort to open oral cavity for SLP and nurse to fully assess clearance. Pt continues to be lethargic and labile as since admission. Administer meds whole 1 AAT as long as pt is alert and awake.     Skilled services were dysphagia not warranted at this time.    Goals:   Multidisciplinary Problems       SLP Goals          Problem: SLP    Goal Priority Disciplines Outcome   SLP Goal     SLP Ongoing, Progressing   Description: LTG: Pt will improve cognitive linguistic skills to SBA level to ensure safety upon discharge home.    STG:  Pt will orient x4 [place].  Pt will attend to structured task for 3 minutes given 3 or less redirections/cues.  Pt will follow 3-step commands w/ 80% acc modA.  Pt will provide solutions to common situations w/ 90% acc [place].  Pt will complete 3-4 step sequencing tasks w/ 80% acc modA.                       Plan:     Patient to be seen:   (PRN)   Plan of Care expires:  02/10/23  Plan of Care reviewed with:   (unable as pt fell asleep towards end of eval.)   SLP Follow-Up:  Yes       Discharge recommendations:      Barriers to Discharge:  Decreased Care Giver Support    Time Tracking:     SLP Treatment Date:      Speech Start Time:  9:40  Speech Stop Time:  1000     Speech Total Time (min):  20 min    Billable Minutes: Eval Swallow and Oral Function 20      Taty Bella M.S., CCC-SLP   02/02/2023

## 2023-02-02 NOTE — SUBJECTIVE & OBJECTIVE
Interval History:  Min assist x 15 ft    Review of Systems   Reason unable to perform ROS: selective mutism.   Objective:     Vital Signs (Most Recent):  Temp: 98.3 °F (36.8 °C) (02/01/23 1508)  Pulse: 67 (02/01/23 1508)  Resp: 20 (02/01/23 0400)  BP: 131/68 (02/01/23 1508)  SpO2: 99 % (02/01/23 1150)   Vital Signs (24h Range):  Temp:  [97.5 °F (36.4 °C)-98.4 °F (36.9 °C)] 98.3 °F (36.8 °C)  Pulse:  [67-85] 67  Resp:  [18-20] 20  SpO2:  [95 %-100 %] 99 %  BP: (129-179)/() 131/68     Weight: 85.5 kg (188 lb 7.9 oz)  Body mass index is 30.42 kg/m².    Intake/Output Summary (Last 24 hours) at 2/1/2023 1817  Last data filed at 2/1/2023 1427  Gross per 24 hour   Intake 600 ml   Output 1750 ml   Net -1150 ml        Physical Exam  Constitutional:       General: She is not in acute distress.     Appearance: She is obese.   HENT:      Head: Normocephalic and atraumatic.      Nose: No congestion.   Cardiovascular:      Rate and Rhythm: Normal rate and regular rhythm.      Heart sounds: No murmur heard.  Pulmonary:      Effort: Pulmonary effort is normal.      Breath sounds: Normal breath sounds.   Abdominal:      General: Bowel sounds are normal. There is no distension.      Palpations: Abdomen is soft. There is no mass.      Tenderness: There is no abdominal tenderness.   Musculoskeletal:         General: Normal range of motion.   Skin:     General: Skin is warm.      Findings: No lesion or rash.   Neurological:      General: No focal deficit present.      Cranial Nerves: No cranial nerve deficit.   Psychiatric:         Attention and Perception: Attention normal.         Mood and Affect: Affect is flat.         Behavior: Behavior is slowed and withdrawn.       Significant Labs: All pertinent labs within the past 24 hours have been reviewed.    Significant Imaging: I have reviewed all pertinent imaging results/findings within the past 24 hours.

## 2023-02-02 NOTE — PLAN OF CARE
Problem: Adult Inpatient Plan of Care  Goal: Plan of Care Review  Outcome: Ongoing, Progressing  Flowsheets (Taken 2/2/2023 1244)  Plan of Care Reviewed With: patient  Goal: Patient-Specific Goal (Individualized)  Outcome: Ongoing, Progressing  Flowsheets (Taken 2/2/2023 1244)  Anxieties, Fears or Concerns: none  Individualized Care Needs: neuro checks and monitor b/p  Goal: Absence of Hospital-Acquired Illness or Injury  Outcome: Ongoing, Progressing  Intervention: Identify and Manage Fall Risk  Flowsheets (Taken 2/2/2023 1244)  Safety Promotion/Fall Prevention:   assistive device/personal item within reach   bed alarm set   nonskid shoes/socks when out of bed   instructed to call staff for mobility  Intervention: Prevent Skin Injury  Flowsheets (Taken 2/2/2023 1244)  Body Position: sitting up in bed  Skin Protection:   adhesive use limited   incontinence pads utilized   tubing/devices free from skin contact  Intervention: Prevent and Manage VTE (Venous Thromboembolism) Risk  Flowsheets (Taken 2/2/2023 1244)  Activity Management: Rolling - L1  VTE Prevention/Management:   bleeding risk assessed   bleeding precations maintained   fluids promoted  Range of Motion: active ROM (range of motion) encouraged  Intervention: Prevent Infection  Flowsheets (Taken 2/2/2023 1244)  Infection Prevention:   equipment surfaces disinfected   hand hygiene promoted  Goal: Optimal Comfort and Wellbeing  Outcome: Ongoing, Progressing  Intervention: Provide Person-Centered Care  Flowsheets (Taken 2/2/2023 1244)  Trust Relationship/Rapport:   care explained   choices provided   questions encouraged  Goal: Readiness for Transition of Care  Outcome: Ongoing, Progressing  Intervention: Mutually Develop Transition Plan  Flowsheets (Taken 2/2/2023 1244)  Communicated LUIS ANTONIO with patient/caregiver: Date not available/Unable to determine  Do you expect to return to your current living situation?: Yes  Do you have help at home or someone to help  you manage your care at home?: No  Readmission within 30 days?: No  Do you currently have service(s) that help you manage your care at home?: No     Problem: Skin Injury Risk Increased  Goal: Skin Health and Integrity  Outcome: Ongoing, Progressing  Intervention: Optimize Skin Protection  Flowsheets (Taken 2/2/2023 1244)  Pressure Reduction Techniques:   frequent weight shift encouraged   pressure points protected   weight shift assistance provided  Pressure Reduction Devices: positioning supports utilized  Skin Protection:   adhesive use limited   incontinence pads utilized   tubing/devices free from skin contact  Head of Bed (HOB) Positioning: HOB at 30-45 degrees  Intervention: Promote and Optimize Oral Intake  Flowsheets (Taken 2/2/2023 1244)  Oral Nutrition Promotion:   calorie-dense foods provided   calorie-dense liquids provided   safe use of adaptive equipment encouraged     Problem: Fall Injury Risk  Goal: Absence of Fall and Fall-Related Injury  Outcome: Ongoing, Progressing  Intervention: Identify and Manage Contributors  Flowsheets (Taken 2/2/2023 1244)  Self-Care Promotion:   safe use of adaptive equipment encouraged   meal set-up provided   BADL personal routines maintained  Medication Review/Management: medications reviewed  Intervention: Promote Injury-Free Environment  Flowsheets (Taken 2/2/2023 1244)  Safety Promotion/Fall Prevention:   assistive device/personal item within reach   bed alarm set   nonskid shoes/socks when out of bed   instructed to call staff for mobility     Problem: Infection  Goal: Absence of Infection Signs and Symptoms  Outcome: Ongoing, Progressing  Intervention: Prevent or Manage Infection  Flowsheets (Taken 2/2/2023 1244)  Fever Reduction/Comfort Measures: fluid intake increased  Infection Management: aseptic technique maintained     Problem: Fluid and Electrolyte Imbalance (Acute Kidney Injury/Impairment)  Goal: Fluid and Electrolyte Balance  Outcome: Ongoing,  Progressing  Intervention: Monitor and Manage Fluid and Electrolyte Balance  Flowsheets (Taken 2/2/2023 1244)  Fluid/Electrolyte Management:   electrolyte supplement initiated   fluids provided     Problem: Oral Intake Inadequate (Acute Kidney Injury/Impairment)  Goal: Optimal Nutrition Intake  Outcome: Ongoing, Progressing  Intervention: Promote and Optimize Nutrition  Flowsheets (Taken 2/2/2023 1244)  Oral Nutrition Promotion:   calorie-dense foods provided   calorie-dense liquids provided   safe use of adaptive equipment encouraged     Problem: Renal Function Impairment (Acute Kidney Injury/Impairment)  Goal: Effective Renal Function  Outcome: Ongoing, Progressing  Intervention: Monitor and Support Renal Function  Flowsheets (Taken 2/2/2023 1244)  Medication Review/Management: medications reviewed

## 2023-02-02 NOTE — SUBJECTIVE & OBJECTIVE
Interval History:  Min assist x 15 ft. Pt has been refusing therapies.     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   Objective:     Vital Signs (Most Recent):  Temp: 97.8 °F (36.6 °C) (02/02/23 1101)  Pulse: 66 (02/02/23 1101)  Resp: 18 (02/02/23 0740)  BP: 116/80 (02/02/23 1101)  SpO2: 97 % (02/02/23 1101)   Vital Signs (24h Range):  Temp:  [97.8 °F (36.6 °C)-98.5 °F (36.9 °C)] 97.8 °F (36.6 °C)  Pulse:  [66-98] 66  Resp:  [18-19] 18  SpO2:  [93 %-98 %] 97 %  BP: (116-158)/(67-87) 116/80     Weight: 85.5 kg (188 lb 7.9 oz)  Body mass index is 30.42 kg/m².    Intake/Output Summary (Last 24 hours) at 2/2/2023 1457  Last data filed at 2/2/2023 0902  Gross per 24 hour   Intake 720 ml   Output 1450 ml   Net -730 ml        Physical Exam  Constitutional:       General: She is not in acute distress.     Appearance: She is obese.   HENT:      Head: Normocephalic and atraumatic.      Nose: No congestion.   Cardiovascular:      Rate and Rhythm: Normal rate and regular rhythm.      Heart sounds: No murmur heard.  Pulmonary:      Effort: Pulmonary effort is normal.      Breath sounds: Normal breath sounds.   Abdominal:      General: Bowel sounds are normal. There is no distension.      Palpations: Abdomen is soft. There is no mass.      Tenderness: There is no abdominal tenderness.   Musculoskeletal:         General: Normal range of motion.   Skin:     General: Skin is warm.      Findings: No lesion or rash.   Neurological:      General: No focal deficit present.      Cranial Nerves: No cranial nerve deficit.   Psychiatric:         Attention and Perception: Attention normal.         Mood and Affect: Affect is flat.         Behavior: Behavior is slowed and withdrawn.       Significant Labs: All pertinent labs within the past 24 hours have been reviewed.    Significant Imaging: I have reviewed all pertinent imaging results/findings within the past 24 hours.

## 2023-02-02 NOTE — PT/OT/SLP PROGRESS
Name: Alysia Washington    : 1970 (52 y.o.)  MRN: [id number]           Patient Unavailable      Patient unable to be seen at this time secondary to: pt refused treatment this AM

## 2023-02-03 PROCEDURE — 97129 THER IVNTJ 1ST 15 MIN: CPT

## 2023-02-03 PROCEDURE — 11000001 HC ACUTE MED/SURG PRIVATE ROOM

## 2023-02-03 PROCEDURE — 97116 GAIT TRAINING THERAPY: CPT

## 2023-02-03 PROCEDURE — 25000003 PHARM REV CODE 250: Performed by: STUDENT IN AN ORGANIZED HEALTH CARE EDUCATION/TRAINING PROGRAM

## 2023-02-03 PROCEDURE — 63600175 PHARM REV CODE 636 W HCPCS: Performed by: STUDENT IN AN ORGANIZED HEALTH CARE EDUCATION/TRAINING PROGRAM

## 2023-02-03 PROCEDURE — 94760 N-INVAS EAR/PLS OXIMETRY 1: CPT

## 2023-02-03 PROCEDURE — 97530 THERAPEUTIC ACTIVITIES: CPT

## 2023-02-03 RX ADMIN — SERTRALINE 25 MG: 25 TABLET, FILM COATED ORAL at 09:02

## 2023-02-03 RX ADMIN — LISINOPRIL 20 MG: 10 TABLET ORAL at 09:02

## 2023-02-03 RX ADMIN — LABETALOL HYDROCHLORIDE 200 MG: 200 TABLET, FILM COATED ORAL at 08:02

## 2023-02-03 RX ADMIN — FAMOTIDINE 20 MG: 20 TABLET, FILM COATED ORAL at 09:02

## 2023-02-03 RX ADMIN — ENOXAPARIN SODIUM 40 MG: 40 INJECTION SUBCUTANEOUS at 05:02

## 2023-02-03 RX ADMIN — LABETALOL HYDROCHLORIDE 200 MG: 200 TABLET, FILM COATED ORAL at 09:02

## 2023-02-03 RX ADMIN — ATORVASTATIN CALCIUM 40 MG: 40 TABLET, FILM COATED ORAL at 08:02

## 2023-02-03 RX ADMIN — LEVOTHYROXINE SODIUM 150 MCG: 0.1 TABLET ORAL at 05:02

## 2023-02-03 RX ADMIN — FOLIC ACID 1 MG: 1 TABLET ORAL at 09:02

## 2023-02-03 RX ADMIN — ASPIRIN 81 MG: 81 TABLET, COATED ORAL at 09:02

## 2023-02-03 RX ADMIN — RISPERIDONE 0.5 MG: 0.5 TABLET ORAL at 08:02

## 2023-02-03 RX ADMIN — NIFEDIPINE 60 MG: 30 TABLET, FILM COATED, EXTENDED RELEASE ORAL at 09:02

## 2023-02-03 RX ADMIN — RISPERIDONE 0.5 MG: 0.5 TABLET ORAL at 09:02

## 2023-02-03 NOTE — PROGRESS NOTES
[facility] Hospital Medicine Progress Note        Chief Complaint: Inpatient Follow-up for     HPI:   Pt exhibiting selective mutism. No records available thus history obtained from record review. Pt presented to the ED complaining of redness, swelling and pain to her left foot that has worsened over the last few months. Boyfriend stated she has not been taking her medication for years and notes that she is been acting differently lately with sluggishness and sometimes seems confused.  In the ED patient with hypertensive urgency as high as 265/154, and profound hypothyroidism with a TSH of 138, T4 less than 0.42 and T3 1.35.  Patient with no concomitant hemodynamic instability or hyponatremia.  Low suspicion for myxedema coma given cortisol level. CT head found pronounced microvascular change and MRI showed left parietal lobe acute to subacute nonhemorrhagic infarct and old lacunar infarcts and severe microangiopathic edema.    Patient admitted for altered mentation secondary to toxic and metabolic causes. She was started on levothyroxine for profound hypothyrodism. Also exhibiting selective mutism. She was evaluated by tele psych who recommend risperidone, sertraline and inpatient psych. Dose of risperidone decreased 2/2 due to somnolence. Poorly controlled hypertension, titrating medications    Min assist x 15 ft. Pt has been refusing therapies.      February 3, 2023, patient is nonverbal, no fever, no shortness for breath      Interval Hx:       Objective/physical exam:  General: In no acute distress, afebrile  Chest: Clear to auscultation bilaterally  Heart: RRR, +S1, S2, no appreciable murmur  Abdomen: Soft, nontender, BS +  MSK: Warm, no lower extremity edema, no clubbing or cyanosis  Neurologic:  Nonverbal    VITAL SIGNS: 24 HRS MIN & MAX LAST   Temp  Min: 97.6 °F (36.4 °C)  Max: 98.7 °F (37.1 °C) 97.6 °F (36.4 °C)   BP  Min: 116/80  Max: 154/80 (!) 154/80     Pulse  Min: 58   Max: 80  71   Resp  Min: 18  Max: 20 18   SpO2  Min: 94 %  Max: 97 % 95 %       Recent Labs   Lab 01/29/23 2038 01/30/23 0711 01/31/23 0335   WBC 10.4 11.0 11.0   RBC 4.57 4.30 4.35   HGB 11.8* 11.3* 11.3*   HCT 39.3 36.6* 37.5   MCV 86.0 85.1 86.2   MCH 25.8 26.3 26.0   MCHC 30.0* 30.9* 30.1*   RDW 16.1 16.3 16.8    315 329   MPV 9.6 9.9 10.7*       Recent Labs   Lab 01/29/23 2038 01/30/23 0711 01/31/23 0335 02/01/23 0330    142 142 142   K 3.2* 3.3* 3.3* 3.6   CO2 25 25 24 26   BUN 24.7* 21.5* 22.6* 21.0*   CREATININE 1.98* 1.46* 1.64* 1.56*   CALCIUM 9.1 8.5 8.8 9.2   MG  --  1.80 2.30  --    ALBUMIN 3.5  --   --   --    ALKPHOS 105  --   --   --    ALT 36  --   --   --    AST 31  --   --   --    BILITOT 0.5  --   --   --           Microbiology Results (last 7 days)       ** No results found for the last 168 hours. **             See below for Radiology    Scheduled Med:   aspirin  81 mg Oral Daily    atorvastatin  40 mg Oral QHS    enoxaparin  40 mg Subcutaneous Daily    famotidine  20 mg Oral Daily    folic acid  1 mg Oral Daily    labetaloL  200 mg Oral Q12H    levothyroxine  150 mcg Oral Before breakfast    lisinopriL  20 mg Oral Daily    NIFEdipine  60 mg Oral Daily    risperiDONE  0.5 mg Oral BID    sertraline  25 mg Oral Daily        Continuous Infusions:       PRN Meds:  acetaminophen, albuterol-ipratropium, aluminum-magnesium hydroxide-simethicone, bisacodyL, dextrose 10%, dextrose 10%, glucagon (human recombinant), glucose, glucose, HYDROcodone-acetaminophen, labetalol, melatonin, morphine, naloxone, ondansetron, ondansetron, polyethylene glycol, simethicone, sodium chloride 0.9%       Assessment/Plan:   Hypothyroidism    -elevated TSH, low T4     -started on levothyroxine 150 mcg     -will need repeat TSH in 6 weeks from 1/30 (on or about 3/13/23)         AMS (altered mental status)    -metabolic encephalopathy secondary to profound hypothyroidism, illicit drug use and folic acid  deficiency    -record review reveals patient has had an inpatient psychiatric admission previously however no notes available    -tele psych recommends inpatient psych    -nonreactive syphilis/HIV, B12 wnl    -folate low--> replete    -risperidone, sertraline              Acute kidney insufficiency    -baseline CKD unknown, suspect pt with baseline CKD III         Essential hypertension    -continue with lisinopril, hydrochlorothiazide, added nifedipine         Stroke    -CT head found pronounced microvascular change    -MRI found small parietal lobe acute to subacute nonhemorrhagic infarct    -aspirin, atorvastatin, BP control    -ascvd risk of 72.3%         Electrolyte abnormality    hypokalemia; replete as required               Illicit drug use    -UDS + for amphetamines               MDD (major depressive disorder)    -risperidone, sertraline    Waiting for inpatient psych acceptance-[facility] denied patient    VTE prophylaxis:     Patient condition:  Stable/Fair/Guarded/ Serious/ Critical    Anticipated discharge and Disposition:         All diagnosis and differential diagnosis have been reviewed; assessment and plan has been documented; I have personally reviewed the labs and test results that are presently available; I have reviewed the patients medication list; I have reviewed the consulting providers response and recommendations. I have reviewed or attempted to review medical records based upon their availability    All of the patient's questions have been  addressed and answered. Patient's is agreeable to the above stated plan. I will continue to monitor closely and make adjustments to medical management as needed.  _____________________________________________________________________    Nutrition Status:    Radiology:  MRI Brain Without Contrast  Narrative: EXAMINATION:  MRI BRAIN WITHOUT CONTRAST    CLINICAL HISTORY:  Mental status change, unknown cause;    TECHNIQUE:  Multiplanar MRI sequences were  performed of the brain without contrast.    COMPARISON:  CT brain without contrast January 29, 2023    FINDINGS:  There are old lacunar infarcts which involve bilateral basal ganglia and the right corona radiata.  There is severe cerebral leukoencephalopathy which likely is caused by chronic microangiopathic ischemia with extensive periventricular and deep white matter T2 FLAIR hyperintense signals.  There are small old hemorrhagic byproducts which involve the mid aspect of the jenn and bilateral basal ganglia with dephasing artifacts on the gradient echo sequence.  There is left parietal lobe deep white matter small diffusion weighted restriction with corresponding signal dropout on ADC map on image 19 series 7 which is consistent with lacunar acute to subacute infarct.  The sella and suprasellar areas are unremarkable.    The cerebellar tonsils are normally positioned. There is no acute intracranial hemorrhage, hydrocephalus, midline shift or mass effect. No acute extra axial fluid collections identified. The mastoid air cells are clear.  Impression: 1.  Left parietal lobe small acute to subacute nonhemorrhagic infarct.    2.  Old lacunar infarcts and severe chronic microangiopathic ischemia.    .    Electronically signed by: Luis Enrique Wright  Date:    01/30/2023  Time:    11:43  CT Head Without Contrast  Narrative: EXAMINATION:  CT HEAD WITHOUT CONTRAST    CLINICAL HISTORY:  Dizziness, persistent/recurrent, cardiac or vascular cause suspected;    TECHNIQUE:  Low dose axial images were obtained through the head.  Coronal and sagittal reformations were also performed. Contrast was not administered.    Automatic exposure control was utilized to reduce the patient's radiation dose.    DLP= 1562    COMPARISON:  07/17/2015    FINDINGS:  Hemorrhage: No acute intracranial hemorrhage is seen.    CSF spaces: The ventricles, sulci and basal cisterns all appear somewhat prominent suggesting an element of global cerebral  atrophy.    Brain parenchyma: Pronounced microvascular change is seen in portions of the periventricular and deep white matter tracts.    Vascular territory infarcts:    Lacunar infarcts: There is a â?O5.7 mmâ?O lacunar infarct seen on â?OImage 24, Series 601â?O in the right. Corona radiata and adjacent capsuloganglionic region.    Cerebellum: Unremarkable.    Sella and skull base: The sella appears to be within normal limits for age.    Intracranial calcifications: Incidental note is made of bilateral choroid plexus calcification. Incidental note is made of some pineal region calcification.    Calvarium: No acute linear or depressed skull fracture is seen.    Maxillofacial Structures:    Paranasal sinuses: The visualized paranasal sinuses appear clear with no significant mucoperiosteal thickening or air fluid levels identified.    Orbits: The orbits appear unremarkable.    Zygomatic arches: The zygomatic arches are intact and unremarkable.    Temporal bones and mastoids: The temporal bones and mastoids appear unremarkable.    TMJ: The mandibular condyles appear normally placed with respect to the mandibular fossa.  Impression: Impression:    1. Pronounced microvascular change is seen in portions of the periventricular and deep white matter tracts. Correlate clinically as regards additional evaluation and follow-up possibly with MRI.    2. No acute intracranial process identified. Details and other findings as noted above.    Electronically signed by: Stephen Perry  Date:    01/30/2023  Time:    06:17      Cristina Bansal MD   02/03/2023

## 2023-02-03 NOTE — PLAN OF CARE
Called all psych facilities that referral was sent to for follow-up    09:50- [facility] -spoke with Jessie and advised that referral was sent on 02/02/2023 for inpatient psyh via [facility]. She stated that they were not able to pull from [facility] and requested it be faxed. Faxed at this time. She verified they received the fax and she would be sending to inpatient team to have them review.     10:00-[facility]-Spoke with Dana and advised that referral was sent on 02/02/2023 for inpatient psyh via [facility]. She requested that it be sent via fax because they were not able to open the referral on careport. Referral faxed at this time.     10:056-[facility]- Spoke with Michelle and advised that referral was sent on 02/01/2023 for inpatient psych via [facility]. She stated that they do not always get the referrals via [facility]port. Referral Faxed. She stated she would have her team review patient.      10:15 [facility]- Spoke with Klaus and advised that referral was sent on 02/01/2023 for inpatient psych via [facility]. She requested that it be resent via fax and that they review referrals for [facility] [place] [facility] [place] [facility]. Referral faxed. Awaiting decision on placement    11:10- spoke with klaus with [facility] who stated that they would not be able to meet the patient's needs and would not be able to accept patient at this time.

## 2023-02-03 NOTE — NURSING
Reached out to Evelyn, Director of Intake at [facility]. Verified that the patient will be rounded on by Tele Psych today.

## 2023-02-03 NOTE — PT/OT/SLP PROGRESS
Name: Alysia Washington    : 1970 (52 y.o.)  MRN: [id number]           Patient Unavailable      Patient unable to be seen at this time secondary to: reported she didn't want to transfer back to bed at this time. Minimal response.

## 2023-02-03 NOTE — PT/OT/SLP PROGRESS
Name: Alysia Washington    : 1970 (52 y.o.)  MRN: [id number]           Patient Unavailable      Patient unable to be seen at this time secondary to: keeping eyes closed upon OT entering room. With encouragement to participate, Pt began falling back asleep. Not appropriate at this time, follow up as schedule allows.

## 2023-02-03 NOTE — PT/OT/SLP PROGRESS
Speech Language Pathology Treatment    Patient Name:  Alysia Washington   MRN:  [id number]  Admitting Diagnosis: Hypothyroidism    Recommendations:                 General Recommendations:  Cognitive-linguistic therapy  Diet recommendations:  low sodium, Liquid Diet Level: Thin liquids - IDDSI Level 0   Aspiration Precautions: HOB to 90 degrees   General Precautions: Standard, fall  Communication strategies:  provide increased time to answer    Subjective     Pt in bed upon SLP arrival. Pt awake and presenting more alert as compared to previous day.    Patient goals:      Pain/Comfort:       Respiratory Status: Room air    Objective:     Has the patient been evaluated by SLP for swallowing?      Keep patient NPO?     Current Respiratory Status:        Pt using gestures upon SLP entry into room (I.e. thumbs up and thumbs down).  Pt oriented to place and situation I'ly. Pt w/ no response to orient to month or year despite BC, MC, extra time and encouragement provided. Pt w/ poor eye contact and looking away at wall w/ SLP standing in front of bed.   Pt continues w/ selective mutism across sessions which hinders ability to track full cognitive abilities.      Cont SLP POC.    Assessment:     Alysia Washington is a 52 y.o. female with an SLP diagnosis of Cognitive-Linguistic Impairment.  She presents with impaired orientation, delayed recall, problem solving, sequencing, attention, and initiation. Pt would benefit from continued SLP services to target above stated deficits in order to promote a return to PLOF.    Goals:   Multidisciplinary Problems       SLP Goals          Problem: SLP    Goal Priority Disciplines Outcome   SLP Goal     SLP Ongoing, Progressing   Description: LTG: Pt will improve cognitive linguistic skills to SBA level to ensure safety upon discharge home.    STG:  Pt will orient x4 [place].  Pt will attend to structured task for 3 minutes given 3 or less redirections/cues.  Pt will follow 3-step commands w/ 80%  acc modA.  Pt will provide solutions to common situations w/ 90% acc [place].  Pt will complete 3-4 step sequencing tasks w/ 80% acc modA.                       Plan:     Patient to be seen:   (PRN)   Plan of Care expires:  02/10/23  Plan of Care reviewed with:    SLP Follow-Up:  Yes       Discharge recommendations:      Barriers to Discharge:  Decreased Care Giver Support    Time Tracking:     SLP Treatment Date:   2/3/23  Speech Start Time:  10:00  Speech Stop Time:  10:10    Speech Total Time (min):  10 min    Billable Minutes: Speech Therapy Individual 25 cognition    Taty Bella M.S., CCC-SLP   02/03/2023

## 2023-02-03 NOTE — PT/OT/SLP PROGRESS
Physical Therapy Treatment Note           Name: Alysia Washington    : 1970 (52 y.o.)  MRN: [id number]           TREATMENT SUMMARY AND RECOMMENDATIONS:    PT Received On: 23  PT Start Time: 1426     PT Stop Time: 1439  PT Total Time (min): 13 min     Subjective Assessment:   No complaints  Lethargic   x Awake, alert, cooperative  Uncooperative    Agitated  c/o pain    Appropriate x c/o fatigue    Confused x Treated at bedside     Emotionally labile  Treated in gym/dept.    Impulsive  Other:    Flat affect       Therapy Precautions:    Cognitive deficits  Spinal precautions    Collar - hard  Sternal precautions    Collar - soft   TLSO   x Fall risk  LSO    Hip precautions - posterior  Knee immobilizer    Hip precautions - anterior  WBAT    Impaired communication  Partial weightbearing    Oxygen  TTWB    PEG tube  NWB    Visual deficits  Other:    Hearing deficits          Treatment Objectives:     Mobility Training:   Assist level Comments    Bed mobility MOD A Sit>supine and rolling R   Transfer MAX A Stand pivot with PT standing in front   Gait     Sit to stand transitions MOD A Using RW from recliner and EOB   Sitting balance     Standing balance      Wheelchair mobility     Car transfer     Other:          Therapeutic Exercise:   Exercise Sets Reps Comments                               Additional Comments:  PT very emotional this afternoon. Pt had refused OT, but then wanted to go back to bed so PT assisted when pt called for help. Boyfriend was in the room.    Assessment: Patient tolerated session Fair.    PT Plan: Continue per POC  Revisions made to plan of care: No    GOALS:   Multidisciplinary Problems       Physical Therapy Goals          Problem: Physical Therapy    Goal Priority Disciplines Outcome Goal Variances Interventions   Physical Therapy Goal     PT, PT/OT Ongoing, Progressing     Description: Goals to be met by: Discharge     Patient will increase functional independence with  mobility by performin. Supine to sit with Modified [place] and Sit to supine with Modified [place]  2. Sit to stand transfer with Modified [place]  3. Bed to chair transfer with Modified [place] using Rolling Walker  4. Gait  x 175 feet with Modified [place] using Rolling Walker.   5. Increased functional strength to 5/5 for LLE.                         Skilled PT Minutes Provided: 14   Communication with Treatment Team:     Equipment recommendations:       At end of treatment, patient remained:   Up in chair     Up in wheelchair in room   x In bed   x With alarm activated   x Bed rails up   x Call bell in reach    x Family/friends present    Restraints secured properly    In bathroom with CNA/RN notified    Nurse aware    In gym with therapist/tech    Other:

## 2023-02-03 NOTE — PLAN OF CARE
Problem: Adult Inpatient Plan of Care  Goal: Plan of Care Review  Outcome: Ongoing, Progressing  Flowsheets (Taken 2/2/2023 2000)  Plan of Care Reviewed With: patient  Goal: Patient-Specific Goal (Individualized)  Outcome: Ongoing, Progressing  Flowsheets (Taken 2/2/2023 2000)  Anxieties, Fears or Concerns: none  Individualized Care Needs: neuro checks and monitor b/p  Goal: Absence of Hospital-Acquired Illness or Injury  Outcome: Ongoing, Progressing  Intervention: Identify and Manage Fall Risk  Flowsheets (Taken 2/2/2023 2000)  Safety Promotion/Fall Prevention:   assistive device/personal item within reach   bed alarm set   nonskid shoes/socks when out of bed   instructed to call staff for mobility  Intervention: Prevent Skin Injury  Flowsheets (Taken 2/2/2023 2000)  Body Position: sitting up in bed  Skin Protection:   adhesive use limited   incontinence pads utilized   tubing/devices free from skin contact  Intervention: Prevent and Manage VTE (Venous Thromboembolism) Risk  Flowsheets (Taken 2/2/2023 2000)  Activity Management: Rolling - L1  VTE Prevention/Management:   bleeding risk assessed   bleeding precations maintained   fluids promoted  Range of Motion: active ROM (range of motion) encouraged  Intervention: Prevent Infection  Flowsheets(Taken 2/2/2023 2000)  Infection Prevention:   equipment surfaces disinfected   hand hygiene promoted  Goal: Optimal Comfort and Wellbeing  Outcome: Ongoing, Progressing  Intervention: Provide Person-Centered Care  Flowsheets (Taken 2/2/2023 2000)  Trust Relationship/Rapport:   care explained   choices provided   questions encouraged  Goal: Readiness for Transition of Care  Outcome: Ongoing, Progressing  Intervention: Mutually Develop Transition Plan  Flowsheets (Taken 2/2/2023 2000)  Communicated LUIS ANTONIO with patient/caregiver: Date not available/Unable to determine  Do you expect to return to your current living situation?: Yes  Do you have help at home or someone to help  you manage your care at home?: No  Readmission within 30 days?: No  Do you currently have service(s) that help you manage your care at home?: No     Problem: Skin Injury Risk Increased  Goal: Skin Health and Integrity  Outcome: Ongoing, Progressing  Intervention: Optimize Skin Protection  Flowsheets(Taken 2/2/2023 2000)  Pressure Reduction Techniques:   frequent weight shift encouraged   pressure points protected   weight shift assistance provided  Pressure Reduction Devices: positioning supports utilized  Skin Protection:   adhesive use limited   incontinence pads utilized   tubing/devices free from skin contact  Head of Bed (HOB) Positioning: HOB at 30-45 degrees  Intervention: Promote and Optimize Oral Intake  Flowsheets (Taken 2/2/2023 2000)  Oral Nutrition Promotion:   calorie-dense foods provided   calorie-dense liquids provided   safe use of adaptive equipment encouraged     Problem: Fall Injury Risk  Goal: Absence of Fall and Fall-Related Injury  Outcome: Ongoing, Progressing  Intervention: Identify and Manage Contributors  Flowsheets (Taken 2/2/2023 2000)  Self-Care Promotion:   safe use of adaptive equipment encouraged   meal set-up provided   BADL personal routines maintained  Medication Review/Management: medications reviewed  Intervention: Promote Injury-Free Environment  Flowsheets (Taken 2/2/2023 2000)  Safety Promotion/Fall Prevention:   assistive device/personal item within reach   bed alarm set   nonskid shoes/socks when out of bed   instructed to call staff for mobility     Problem: Infection  Goal: Absence of Infection Signs and Symptoms  Outcome: Ongoing, Progressing  Intervention: Prevent or Manage Infection  Flowsheets (Taken 2/2/2023 2000)  Fever Reduction/Comfort Measures: fluid intake increased  Infection Management: aseptic technique maintained     Problem: Fluid and Electrolyte Imbalance (Acute Kidney Injury/Impairment)  Goal: Fluid and Electrolyte Balance  Outcome: Ongoing,  Progressing  Intervention: Monitor and Manage Fluid and Electrolyte Balance  Flowsheets (Taken 2/2/2023 2000)  Fluid/Electrolyte Management:   electrolyte supplement initiated   fluids provided     Problem: Oral Intake Inadequate (Acute Kidney Injury/Impairment)  Goal: Optimal Nutrition Intake  Outcome: Ongoing, Progressing  Intervention: Promote and Optimize Nutrition  Flowsheets ((Taken 2/2/2023 2000))  Oral Nutrition Promotion:   calorie-dense foods provided   calorie-dense liquids provided   safe use of adaptive equipment encouraged     Problem: Renal Function Impairment (Acute Kidney Injury/Impairment)  Goal: Effective Renal Function  Outcome: Ongoing, Progressing  Intervention: Monitor and Support Renal Function  Flowsheets (T(Taken 2/2/2023 2000))  Medication Review/Management: medications reviewed

## 2023-02-03 NOTE — PT/OT/SLP PROGRESS
Physical Therapy Treatment Note           Name: Alysia Washington    : 1970 (52 y.o.)  MRN: [id number]           TREATMENT SUMMARY AND RECOMMENDATIONS:    PT Received On: 23  PT Start Time: 1030     PT Stop Time: 1055  PT Total Time (min): 25 min     Subjective Assessment:   No complaints  Lethargic   x Awake, alert, cooperative  Uncooperative    Agitated  c/o pain    Appropriate x c/o fatigue    Confused x Treated at bedside     Emotionally labile  Treated in gym/dept.    Impulsive  Other:    Flat affect       Therapy Precautions:    Cognitive deficits  Spinal precautions    Collar - hard  Sternal precautions    Collar - soft   TLSO   x Fall risk  LSO    Hip precautions - posterior  Knee immobilizer    Hip precautions - anterior  WBAT    Impaired communication  Partial weightbearing    Oxygen  TTWB    PEG tube  NWB    Visual deficits  Other:    Hearing deficits          Treatment Objectives:     Mobility Training:   Assist level Comments    Bed mobility MOD A Supine>Sit and rolling L   Transfer MAX A Stand pivot tried using RW.   Gait MOD A x 2  X 5 feet using RW, pt needing max verbal and tactile cues for completion. Very slow with delayed movement initiation.    Sit to stand transitions MIN/MOD A x 2 Using RW from recliner and EOB   Sitting balance     Standing balance      Wheelchair mobility     Car transfer     Other:          Therapeutic Exercise:   Exercise Sets Reps Comments                               Additional Comments:  PT had recliner set 15 feet away from pt, but pt stopped ambulating at 5 feet and became unresponsive to PT cues needing assistance to chair. Tech got recliner and PT had to assist with transfer into chair because pt would not initiate movement to sit. Fresh brief was donned.    Assessment: Patient tolerated session Fair/Poor.    PT Plan: Continue per POC  Revisions made to plan of care: No    GOALS:   Multidisciplinary Problems       Physical Therapy Goals           Problem: Physical Therapy    Goal Priority Disciplines Outcome Goal Variances Interventions   Physical Therapy Goal     PT, PT/OT Ongoing, Progressing     Description: Goals to be met by: Discharge     Patient will increase functional independence with mobility by performin. Supine to sit with Modified [place] and Sit to supine with Modified [place]  2. Sit to stand transfer with Modified [place]  3. Bed to chair transfer with Modified [place] using Rolling Walker  4. Gait  x 175 feet with Modified [place] using Rolling Walker.   5. Increased functional strength to 5/5 for LLE.                         Skilled PT Minutes Provided: 25   Communication with Treatment Team:     Equipment recommendations:       At end of treatment, patient remained:  x Up in chair     Up in wheelchair in room    In bed   x With alarm activated    Bed rails up   x Call bell in reach     Family/friends present    Restraints secured properly    In bathroom with CNA/RN notified   x Nurse aware    In gym with therapist/tech   x Other:Pure [place] on

## 2023-02-03 NOTE — PLAN OF CARE
Problem: Adult Inpatient Plan of Care  Goal: Plan of Care Review  Outcome: Ongoing, Progressing  Goal: Patient-Specific Goal (Individualized)  Outcome: Ongoing, Progressing  Flowsheets (Taken 2/3/2023 1623)  Anxieties, Fears or Concerns: patient does not answer  Individualized Care Needs: neuro checks  Goal: Absence of Hospital-Acquired Illness or Injury  Outcome: Ongoing, Progressing  Intervention: Prevent Skin Injury  Flowsheets (Taken 2/3/2023 0800)  Body Position: side-lying  Intervention: Prevent Infection  Flowsheets (Taken 2/3/2023 0800)  Infection Prevention:   cohorting utilized   single patient room provided   environmental surveillance performed   hand hygiene promoted   rest/sleep promoted   personal protective equipment utilized   equipment surfaces disinfected  Goal: Optimal Comfort and Wellbeing  Outcome: Ongoing, Progressing  Intervention: Monitor Pain and Promote Comfort  Flowsheets (Taken 2/3/2023 0800)  Pain Management Interventions:   relaxation techniques promoted   quiet environment facilitated  Goal: Readiness for Transition of Care  Outcome: Ongoing, Progressing  Intervention: Mutually Develop Transition Plan  Flowsheets (Taken 2/3/2023 0800)  Communicated LUIS ANTONIO with patient/caregiver: Date not available/Unable to determine     Problem: Skin Injury Risk Increased  Goal: Skin Health and Integrity  Outcome: Ongoing, Progressing  Intervention: Optimize Skin Protection  Flowsheets (Taken 2/3/2023 0800)  Head of Bed (HOB) Positioning: HOB at 30 degrees  Intervention: Promote and Optimize Oral Intake  Flowsheets (Taken 2/3/2023 0800)  Oral Nutrition Promotion:   calorie-dense foods provided   calorie-dense liquids provided   rest periods promoted   referred to outpatient services     Problem: Fall Injury Risk  Goal: Absence of Fall and Fall-Related Injury  Outcome: Ongoing, Progressing  Intervention: Identify and Manage Contributors  Flowsheets (Taken 2/3/2023 0800)  Self-Care Promotion:    independence encouraged   BADL personal objects within reach   BADL personal routines maintained   safe use of adaptive equipment encouraged     Problem: Infection  Goal: Absence of Infection Signs and Symptoms  Outcome: Ongoing, Progressing  Intervention: Prevent or Manage Infection  Flowsheets (Taken 2/3/2023 0800)  Fever Reduction/Comfort Measures:   lightweight bedding   lightweight clothing     Problem: Fluid and Electrolyte Imbalance (Acute Kidney Injury/Impairment)  Goal: Fluid and Electrolyte Balance  Outcome: Ongoing, Progressing  Intervention: Monitor and Manage Fluid and Electrolyte Balance  Flowsheets (Taken 2/3/2023 0800)  Fluid/Electrolyte Management: fluids provided     Problem: Oral Intake Inadequate (Acute Kidney Injury/Impairment)  Goal: Optimal Nutrition Intake  Outcome: Ongoing, Progressing  Intervention: Promote and Optimize Nutrition  Flowsheets (Taken 2/3/2023 0800)  Oral Nutrition Promotion:   calorie-dense foods provided   calorie-dense liquids provided   rest periods promoted   referred to outpatient services     Problem: Renal Function Impairment (Acute Kidney Injury/Impairment)  Goal: Effective Renal Function  Outcome: Ongoing, Progressing

## 2023-02-04 PROCEDURE — 94760 N-INVAS EAR/PLS OXIMETRY 1: CPT

## 2023-02-04 PROCEDURE — 11000001 HC ACUTE MED/SURG PRIVATE ROOM

## 2023-02-04 PROCEDURE — 25000003 PHARM REV CODE 250: Performed by: STUDENT IN AN ORGANIZED HEALTH CARE EDUCATION/TRAINING PROGRAM

## 2023-02-04 RX ADMIN — RISPERIDONE 0.5 MG: 0.5 TABLET ORAL at 09:02

## 2023-02-04 RX ADMIN — LABETALOL HYDROCHLORIDE 200 MG: 200 TABLET, FILM COATED ORAL at 09:02

## 2023-02-04 RX ADMIN — LEVOTHYROXINE SODIUM 150 MCG: 0.1 TABLET ORAL at 05:02

## 2023-02-04 RX ADMIN — ATORVASTATIN CALCIUM 40 MG: 40 TABLET, FILM COATED ORAL at 09:02

## 2023-02-04 NOTE — PLAN OF CARE
Problem: Adult Inpatient Plan of Care  Goal: Plan of Care Review  Outcome: Ongoing, Progressing  Flowsheets (Taken 2/4/2023 0450)  Plan of Care Reviewed With: patient  Goal: Absence of Hospital-Acquired Illness or Injury  Outcome: Ongoing, Progressing  Intervention: Identify and Manage Fall Risk  Flowsheets (Taken 2/4/2023 0450)  Safety Promotion/Fall Prevention:   assistive device/personal item within reach   bed alarm set   side rails raised x 2  Intervention: Prevent Skin Injury  Flowsheets (Taken 2/4/2023 0450)  Body Position: position changed independently  Skin Protection: adhesive use limited     Problem: Fall Injury Risk  Goal: Absence of Fall and Fall-Related Injury  Outcome: Ongoing, Progressing  Intervention: Identify and Manage Contributors  Flowsheets (Taken 2/4/2023 0450)  Self-Care Promotion: BADL personal objects within reach  Medication Review/Management: medications reviewed  Intervention: Promote Injury-Free Environment  Flowsheets (Taken 2/4/2023 0450)  Safety Promotion/Fall Prevention:   assistive device/personal item within reach   bed alarm set   side rails raised x 2

## 2023-02-04 NOTE — PT/OT/SLP PROGRESS
Name: Alysia Washington    : 1970 (52 y.o.)  MRN: [id number]           Patient Unavailable      Patient unable to be seen at this time secondary to: Pt appeared to be awake upon PT entering the room, but was non-responsive to communication attempts.

## 2023-02-04 NOTE — PT/OT/SLP DISCHARGE
"Subjective: n/a    Patient is a 52 y.o. female. Minimally responsive to prompts, mainly answers, mostly nods and sometimes won't answer altogether. Sometimes will point at cokes she wants. Articulated "no" when asked about SI, articulated "no" when asked about HI. Appears to smirk at times when asked certain questions, smiles briefly but then is flat again nearly immediately. Shakes head that "no" when asked if she was aware of date, started crying (several episodes) when asked if she had any visitors since being in the hospital. Most collateral information and information obtained from external and medical records. 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 Presented to ED complaining of redness, swelling and pain to her left foot that has worsened over the last few months. Boyfriend stated she has not been taking her medication for years and notes that she is been acting differently lately with sluggishness and sometimes seems confused. Patient presented in ER with hypertensive urgency as high as 265/154, and profound hypothyroidism with a TSH of 138, T4 less than 0.42 and T3 1.35.  Patient with no concomitant hemodynamic instability or hyponatremia.  Low suspicion for myxedema coma given cortisol level. CT head found pronounced microvascular change and MRI showed left parietal lobe acute to subacute nonhemorrhagic infarct and old lacunar infarcts and severe microangiopathic edema. UDS (+) amphetamines, questionable substance abuse. ETOH not available. Was unable to obtain external pharmacy records.       Past Psychiatric History:   Questionable, pt not responsive to this question  Currently in treatment with n/a, poor compliance per SO  Education:  unavailable    Substance Abuse History:  Recreational drugs: methamphetamines and questionable  Use of Alcohol:  not noted, questionable  Use of Caffeine: denies use  Use of OTC: n/a    Patient Active Problem List    Diagnosis Date Noted    MDD (major depressive " disorder) 02/01/2023    Illicit drug use 01/31/2023    Electrolyte abnormality 01/31/2023    Acute kidney insufficiency 01/30/2023    Hypothyroidism 01/30/2023    Essential hypertension 01/30/2023    AMS (altered mental status) 01/30/2023    Stroke 01/30/2023     History reviewed. No pertinent past medical history.   History reviewed. No pertinent surgical history.   No medications prior to admission.     Review of patient's allergies indicates:  No Known Allergies   Social History     Tobacco Use    Smoking status: Not on file    Smokeless tobacco: Not on file   Substance Use Topics    Alcohol use: Not on file     Psychiatric Review Of Systems:  sleep: yes  appetite changes: yes  weight changes: no  energy/anergy: yes  interest/pleasure/anhedonia: yes  somatic symptoms: yes  libido: no  anxiety/panic: yes  guilty/hopeless: no  S.I.B.s/risky behavior: no  any drugs: no, questionable  alcohol: no, questionable       Objective:  Vital signs:  Temp:  [97.4 °F (36.3 °C)-99.1 °F (37.3 °C)] 99.1 °F (37.3 °C)  Pulse:  [59-80] 76  Resp:  [18-20] 19  SpO2:  [94 %-100 %] 95 %  BP: (116-154)/(70-82) 129/79        Mental Status Evaluation:  Appearance:  unremarkable, age appropriate, lying in bed, overweight   Behavior:  reluctant to participate, eye contact minimal   Speech:  articulation error, mute   Mood:  anxious, sad   Affect:  flat, periods of crying   Thought Process:  blocked, circumstantial   Thought Content:  normal, no suicidality, no homicidality, delusions, or paranoia, questionable poverty of content   Sensorium:  person, place, questionable   Cognition:  memory > able to remember recent events- no, able to remember remote events- no and impaired due to cognition involvement/questionable   Insight:  poor   Judgment:  poor     Assessment/Plan:  Axis I: Adjustment Disorder with Mixed Emotional Features, Depressive Disorder NOS, 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 No pertinent past medical history.  Axis IV: economic problems, housing problems, occupational problems, other psychosocial or environmental problems, problems related to social environment, problems with access to health care services, and problems with primary support group  Axis V: 0 Inadequate information      Recommendations:   1.) cont. Zoloft/Risperdal as RX  2.) cont encourage orientation/communication efforts  3.) appears to no meet physical criteria for inpt psych d/t physical limitations and current debilitation, also questionable psych presentations vs medical compromise  4.) psych sign off/reconsult as necessary

## 2023-02-04 NOTE — PROGRESS NOTES
[facility] Hospital Medicine Progress Note        Chief Complaint: Inpatient Follow-up for     HPI:   Pt exhibiting selective mutism. No records available thus history obtained from record review. Pt presented to the ED complaining of redness, swelling and pain to her left foot that has worsened over the last few months. Boyfriend stated she has not been taking her medication for years and notes that she is been acting differently lately with sluggishness and sometimes seems confused.  In the ED patient with hypertensive urgency as high as 265/154, and profound hypothyroidism with a TSH of 138, T4 less than 0.42 and T3 1.35.  Patient with no concomitant hemodynamic instability or hyponatremia.  Low suspicion for myxedema coma given cortisol level. CT head found pronounced microvascular change and MRI showed left parietal lobe acute to subacute nonhemorrhagic infarct and old lacunar infarcts and severe microangiopathic edema.         Patient admitted for altered mentation secondary to toxic and metabolic causes. She was started on levothyroxine for profound hypothyrodism. Also exhibiting selective mutism. She was evaluated by tele psych who recommend risperidone, sertraline and inpatient psych. Dose of risperidone decreased 2/2 due to somnolence. Poorly controlled hypertension, titrating medications         Min assist x 15 ft. Pt has been refusing therapies.          February 3, 2023, patient is nonverbal, no fever, no shortness for breath    February 4, 2023, patient refused physical therapy, patient refused medication, patient is nonverbal    Interval Hx:       Objective/physical exam:  General: In no acute distress, afebrile  Chest:  Bilateral rhonchi  Heart: RRR, +S1, S2, no appreciable murmur  Abdomen: Soft, nontender, BS +  MSK: Warm, no lower extremity edema, no clubbing or cyanosis  Neurologic:  Patient is nonverbal    VITAL SIGNS: 24 HRS MIN & MAX LAST   Temp  Min: 97.4 °F  (36.3 °C)  Max: 99.1 °F (37.3 °C) 98 °F (36.7 °C)   BP  Min: 106/68  Max: 130/64 106/68   Pulse  Min: 59  Max: 76  64   Resp  Min: 18  Max: 20 20   SpO2  Min: 93 %  Max: 100 % (!) 94 %         Recent Labs   Lab 01/29/23 2038 01/30/23 0711 01/31/23 0335   WBC 10.4 11.0 11.0   RBC 4.57 4.30 4.35   HGB 11.8* 11.3* 11.3*   HCT 39.3 36.6* 37.5   MCV 86.0 85.1 86.2   MCH 25.8 26.3 26.0   MCHC 30.0* 30.9* 30.1*   RDW 16.1 16.3 16.8    315 329   MPV 9.6 9.9 10.7*       Recent Labs   Lab 01/29/23 2038 01/30/23 0711 01/31/23 0335 02/01/23  0330    142 142 142   K 3.2* 3.3* 3.3* 3.6   CO2 25 25 24 26   BUN 24.7* 21.5* 22.6* 21.0*   CREATININE 1.98* 1.46* 1.64* 1.56*   CALCIUM 9.1 8.5 8.8 9.2   MG  --  1.80 2.30  --    ALBUMIN 3.5  --   --   --    ALKPHOS 105  --   --   --    ALT 36  --   --   --    AST 31  --   --   --    BILITOT 0.5  --   --   --           Microbiology Results (last 7 days)       ** No results found for the last 168 hours. **             See below for Radiology    Scheduled Med:   aspirin  81 mg Oral Daily    atorvastatin  40 mg Oral QHS    enoxaparin  40 mg Subcutaneous Daily    famotidine  20 mg Oral Daily    folic acid  1 mg Oral Daily    labetaloL  200 mg Oral Q12H    levothyroxine  150 mcg Oral Before breakfast    lisinopriL  20 mg Oral Daily    NIFEdipine  60 mg Oral Daily    risperiDONE  0.5 mg Oral BID    sertraline  25 mg Oral Daily        Continuous Infusions:       PRN Meds:  acetaminophen, albuterol-ipratropium, aluminum-magnesium hydroxide-simethicone, bisacodyL, dextrose 10%, dextrose 10%, glucagon (human recombinant), glucose, glucose, HYDROcodone-acetaminophen, labetalol, melatonin, morphine, naloxone, ondansetron, ondansetron, polyethylene glycol, simethicone, sodium chloride 0.9%       Assessment/Plan:     Hypothyroidism         -elevated TSH, low T4          -started on levothyroxine 150 mcg          -will need repeat TSH in 6 weeks from 1/30 (on or about 3/13/23)                    AMS (altered mental status)         -metabolic encephalopathy secondary to profound hypothyroidism, illicit drug use and folic acid deficiency         -record review reveals patient has had an inpatient psychiatric admission previously however no notes available         -tele psych recommends inpatient psych         -nonreactive syphilis/HIV, B12 wnl         -folate low--> replete         -risperidone, sertraline                             Acute kidney insufficiency         -baseline CKD unknown, suspect pt with baseline CKD III                   Essential hypertension         -continue with lisinopril, hydrochlorothiazide, added nifedipine                   Stroke         -CT head found pronounced microvascular change         -MRI found small parietal lobe acute to subacute nonhemorrhagic infarct         -aspirin, atorvastatin, BP control         -ascvd risk of 72.3%                   Electrolyte abnormality         hypokalemia; replete as required                              Illicit drug use         -UDS + for amphetamines                              MDD (major depressive disorder)         -risperidone, sertraline         Waiting for inpatient psych acceptance-[facility] denied patient       VTE prophylaxis:     Patient condition:  Stable/Fair/Guarded/ Serious/ Critical    Anticipated discharge and Disposition:         All diagnosis and differential diagnosis have been reviewed; assessment and plan has been documented; I have personally reviewed the labs and test results that are presently available; I have reviewed the patients medication list; I have reviewed the consulting providers response and recommendations. I have reviewed or attempted to review medical records based upon their availability    All of the patient's questions have been  addressed and answered. Patient's is agreeable to the above stated plan. I will continue to monitor closely and make adjustments to medical management as  needed.  _____________________________________________________________________    Nutrition Status:    Radiology:  MRI Brain Without Contrast  Narrative: EXAMINATION:  MRI BRAIN WITHOUT CONTRAST    CLINICAL HISTORY:  Mental status change, unknown cause;    TECHNIQUE:  Multiplanar MRI sequences were performed of the brain without contrast.    COMPARISON:  CT brain without contrast January 29, 2023    FINDINGS:  There are old lacunar infarcts which involve bilateral basal ganglia and the right corona radiata.  There is severe cerebral leukoencephalopathy which likely is caused by chronic microangiopathic ischemia with extensive periventricular and deep white matter T2 FLAIR hyperintense signals.  There are small old hemorrhagic byproducts which involve the mid aspect of the jenn and bilateral basal ganglia with dephasing artifacts on the gradient echo sequence.  There is left parietal lobe deep white matter small diffusion weighted restriction with corresponding signal dropout on ADC map on image 19 series 7 which is consistent with lacunar acute to subacute infarct.  The sella and suprasellar areas are unremarkable.    The cerebellar tonsils are normally positioned. There is no acute intracranial hemorrhage, hydrocephalus, midline shift or mass effect. No acute extra axial fluid collections identified. The mastoid air cells are clear.  Impression: 1.  Left parietal lobe small acute to subacute nonhemorrhagic infarct.    2.  Old lacunar infarcts and severe chronic microangiopathic ischemia.    .    Electronically signed by: Luis Enrique Wright  Date:    01/30/2023  Time:    11:43  CT Head Without Contrast  Narrative: EXAMINATION:  CT HEAD WITHOUT CONTRAST    CLINICAL HISTORY:  Dizziness, persistent/recurrent, cardiac or vascular cause suspected;    TECHNIQUE:  Low dose axial images were obtained through the head.  Coronal and sagittal reformations were also performed. Contrast was not administered.    Automatic exposure  control was utilized to reduce the patient's radiation dose.    DLP= 1562    COMPARISON:  07/17/2015    FINDINGS:  Hemorrhage: No acute intracranial hemorrhage is seen.    CSF spaces: The ventricles, sulci and basal cisterns all appear somewhat prominent suggesting an element of global cerebral atrophy.    Brain parenchyma: Pronounced microvascular change is seen in portions of the periventricular and deep white matter tracts.    Vascular territory infarcts:    Lacunar infarcts: There is a â?O5.7 mmâ?O lacunar infarct seen on â?OImage 24, Series 601â?O in the right. Corona radiata and adjacent capsuloganglionic region.    Cerebellum: Unremarkable.    Sella and skull base: The sella appears to be within normal limits for age.    Intracranial calcifications: Incidental note is made of bilateral choroid plexus calcification. Incidental note is made of some pineal region calcification.    Calvarium: No acute linear or depressed skull fracture is seen.    Maxillofacial Structures:    Paranasal sinuses: The visualized paranasal sinuses appear clear with no significant mucoperiosteal thickening or air fluid levels identified.    Orbits: The orbits appear unremarkable.    Zygomatic arches: The zygomatic arches are intact and unremarkable.    Temporal bones and mastoids: The temporal bones and mastoids appear unremarkable.    TMJ: The mandibular condyles appear normally placed with respect to the mandibular fossa.  Impression: Impression:    1. Pronounced microvascular change is seen in portions of the periventricular and deep white matter tracts. Correlate clinically as regards additional evaluation and follow-up possibly with MRI.    2. No acute intracranial process identified. Details and other findings as noted above.    Electronically signed by: Stephen Perry  Date:    01/30/2023  Time:    06:17      Cristina Bansal MD   02/04/2023

## 2023-02-05 PROCEDURE — 11000001 HC ACUTE MED/SURG PRIVATE ROOM

## 2023-02-05 PROCEDURE — 25000003 PHARM REV CODE 250: Performed by: STUDENT IN AN ORGANIZED HEALTH CARE EDUCATION/TRAINING PROGRAM

## 2023-02-05 PROCEDURE — 94760 N-INVAS EAR/PLS OXIMETRY 1: CPT

## 2023-02-05 RX ADMIN — LABETALOL HYDROCHLORIDE 200 MG: 200 TABLET, FILM COATED ORAL at 08:02

## 2023-02-05 RX ADMIN — SERTRALINE 25 MG: 25 TABLET, FILM COATED ORAL at 07:02

## 2023-02-05 RX ADMIN — RISPERIDONE 0.5 MG: 0.5 TABLET ORAL at 07:02

## 2023-02-05 RX ADMIN — LABETALOL HYDROCHLORIDE 200 MG: 200 TABLET, FILM COATED ORAL at 07:02

## 2023-02-05 RX ADMIN — LISINOPRIL 20 MG: 10 TABLET ORAL at 07:02

## 2023-02-05 RX ADMIN — ASPIRIN 81 MG: 81 TABLET, COATED ORAL at 07:02

## 2023-02-05 RX ADMIN — FAMOTIDINE 20 MG: 20 TABLET, FILM COATED ORAL at 07:02

## 2023-02-05 RX ADMIN — ATORVASTATIN CALCIUM 40 MG: 40 TABLET, FILM COATED ORAL at 08:02

## 2023-02-05 RX ADMIN — LEVOTHYROXINE SODIUM 150 MCG: 0.1 TABLET ORAL at 07:02

## 2023-02-05 RX ADMIN — FOLIC ACID 1 MG: 1 TABLET ORAL at 07:02

## 2023-02-05 RX ADMIN — NIFEDIPINE 60 MG: 30 TABLET, FILM COATED, EXTENDED RELEASE ORAL at 07:02

## 2023-02-05 RX ADMIN — RISPERIDONE 0.5 MG: 0.5 TABLET ORAL at 08:02

## 2023-02-05 NOTE — PROGRESS NOTES
Hospital Medicine  Progress Note    Patient Name: Alysia Washington  MRN: [id number]  Status: IP- Inpatient   Admission Date: 1/29/2023  Length of Stay: 5      CC: hospital follow-up for        SUBJECTIVE   Pt exhibiting selective mutism. No records available thus history obtained from record review. Pt presented to the ED complaining of redness, swelling and pain to her left foot that has worsened over the last few months. Boyfriend stated she has not been taking her medication for years and notes that she is been acting differently lately with sluggishness and sometimes seems confused.  In the ED patient with hypertensive urgency as high as 265/154, and profound hypothyroidism with a TSH of 138, T4 less than 0.42 and T3 1.35.  Patient with no concomitant hemodynamic instability or hyponatremia.  Low suspicion for myxedema coma given cortisol level. CT head found pronounced microvascular change and MRI showed left parietal lobe acute to subacute nonhemorrhagic infarct and old lacunar infarcts and severe microangiopathic edema.           Patient admitted for altered mentation secondary to toxic and metabolic causes. She was started on levothyroxine for profound hypothyrodism. Also exhibiting selective mutism. She was evaluated by tele psych who recommend risperidone, sertraline and inpatient psych. Dose of risperidone decreased 2/2 due to somnolence. Poorly controlled hypertension, titrating medications           Min assist x 15 ft. Pt has been refusing therapies.            February 3, 2023, patient is nonverbal, no fever, no shortness for breath     February 4, 2023, patient refused physical therapy, patient refused medication, patient is nonverbal    02/05/2023 appear stable but not talking today   No major change overnight     Today: Patient seen and examined at bedside, and chart reviewed.       MEDICATIONS   Scheduled   aspirin  81 mg Oral Daily    atorvastatin  40 mg Oral QHS    enoxaparin  40 mg Subcutaneous  Daily    famotidine  20 mg Oral Daily    folic acid  1 mg Oral Daily    labetaloL  200 mg Oral Q12H    levothyroxine  150 mcg Oral Before breakfast    lisinopriL  20 mg Oral Daily    NIFEdipine  60 mg Oral Daily    risperiDONE  0.5 mg Oral BID    sertraline  25 mg Oral Daily     Continuous Infusions        PHYSICAL EXAM   VITALS: T 98 °F (36.7 °C)   /83   P 60   RR 18   O2 (!) 94 %    GENERAL: appear resting comfortably in bed  LUNGS: CTA B/L  CVS: Normal rate  GI/: Soft, NT, bowel sounds positive.  EXTREMITIES: No peripheral edema  NEURO: AAOx3  PSYCH: Cooperative      LABS   CBC  No results for input(s): WBC, RBC, HGB, HCT, MCV, MCH, MCHC, RDW, PLT, RETIC, ESR in the last 72 hours.  CHEM  No results for input(s): NA, K, CHLORIDE, CO2, BUN, CREATININE, GLUCOSE, CALCIUM, MG, PHOS, ALBUMIN, GLOBULIN, ALKPHOS, ALT, AST, BILITOT, LIPASE, CRP, LDH, HAPTOGLOBIN, FERRITIN, IRON, TIBC, TSH, FREET4 in the last 72 hours.      MICROBIOLOGY     Microbiology Results (last 7 days)       ** No results found for the last 168 hours. **              DIAGNOSTICS   MRI Brain Without Contrast  Narrative: EXAMINATION:  MRI BRAIN WITHOUT CONTRAST    CLINICAL HISTORY:  Mental status change, unknown cause;    TECHNIQUE:  Multiplanar MRI sequences were performed of the brain without contrast.    COMPARISON:  CT brain without contrast January 29, 2023    FINDINGS:  There are old lacunar infarcts which involve bilateral basal ganglia and the right corona radiata.  There is severe cerebral leukoencephalopathy which likely is caused by chronic microangiopathic ischemia with extensive periventricular and deep white matter T2 FLAIR hyperintense signals.  There are small old hemorrhagic byproducts which involve the mid aspect of the jenn and bilateral basal ganglia with dephasing artifacts on the gradient echo sequence.  There is left parietal lobe deep white matter small diffusion weighted restriction with corresponding signal dropout on  ADC map on image 19 series 7 which is consistent with lacunar acute to subacute infarct.  The sella and suprasellar areas are unremarkable.    The cerebellar tonsils are normally positioned. There is no acute intracranial hemorrhage, hydrocephalus, midline shift or mass effect. No acute extra axial fluid collections identified. The mastoid air cells are clear.  Impression: 1.  Left parietal lobe small acute to subacute nonhemorrhagic infarct.    2.  Old lacunar infarcts and severe chronic microangiopathic ischemia.    .    Electronically signed by: Luis Enrique Wright  Date:    01/30/2023  Time:    11:43  CT Head Without Contrast  Narrative: EXAMINATION:  CT HEAD WITHOUT CONTRAST    CLINICAL HISTORY:  Dizziness, persistent/recurrent, cardiac or vascular cause suspected;    TECHNIQUE:  Low dose axial images were obtained through the head.  Coronal and sagittal reformations were also performed. Contrast was not administered.    Automatic exposure control was utilized to reduce the patient's radiation dose.    DLP= 1562    COMPARISON:  07/17/2015    FINDINGS:  Hemorrhage: No acute intracranial hemorrhage is seen.    CSF spaces: The ventricles, sulci and basal cisterns all appear somewhat prominent suggesting an element of global cerebral atrophy.    Brain parenchyma: Pronounced microvascular change is seen in portions of the periventricular and deep white matter tracts.    Vascular territory infarcts:    Lacunar infarcts: There is a â?O5.7 mmâ?O lacunar infarct seen on â?OImage 24, Series 601â?O in the right. Corona radiata and adjacent capsuloganglionic region.    Cerebellum: Unremarkable.    Sella and skull base: The sella appears to be within normal limits for age.    Intracranial calcifications: Incidental note is made of bilateral choroid plexus calcification. Incidental note is made of some pineal region calcification.    Calvarium: No acute linear or depressed skull fracture is seen.    Maxillofacial  Structures:    Paranasal sinuses: The visualized paranasal sinuses appear clear with no significant mucoperiosteal thickening or air fluid levels identified.    Orbits: The orbits appear unremarkable.    Zygomatic arches: The zygomatic arches are intact and unremarkable.    Temporal bones and mastoids: The temporal bones and mastoids appear unremarkable.    TMJ: The mandibular condyles appear normally placed with respect to the mandibular fossa.  Impression: Impression:    1. Pronounced microvascular change is seen in portions of the periventricular and deep white matter tracts. Correlate clinically as regards additional evaluation and follow-up possibly with MRI.    2. No acute intracranial process identified. Details and other findings as noted above.    Electronically signed by: Stephen Perry  Date:    01/30/2023  Time:    06:17        ASSESSMENT/PLAN:    Hypothyroidism           -elevated TSH, low T4            -started on levothyroxine 150 mcg            -will need repeat TSH in 6 weeks from 1/30 (on or about 3/13/23)                       AMS (altered mental status)           -metabolic encephalopathy secondary to profound hypothyroidism, illicit drug use and folic acid deficiency           -record review reveals patient has had an inpatient psychiatric admission previously however no notes available           -tele psych recommends inpatient psych           -nonreactive syphilis/HIV, B12 wnl           -folate low--> replete           -risperidone, sertraline                                   Acute kidney insufficiency           -baseline CKD unknown, suspect pt with baseline CKD III                       Essential hypertension           -continue with lisinopril, hydrochlorothiazide, added nifedipine                       Stroke           -CT head found pronounced microvascular change           -MRI found small parietal lobe acute to subacute nonhemorrhagic infarct           -aspirin, atorvastatin, BP  control           -ascvd risk of 72.3%                       Electrolyte abnormality           hypokalemia; replete as required                                    Illicit drug use           -UDS + for amphetamines                                    MDD (major depressive disorder)           -risperidone, sertraline           Waiting for inpatient psych acceptance-[facility] denied patient            Prophylaxis: maya Reyna MD  [facility]

## 2023-02-05 NOTE — PLAN OF CARE
[facility]  Discharge Reassessment    Primary Care Provider: No primary care provider on file.    Expected Discharge Date:     Reassessment (most recent)       Discharge Reassessment - 02/05/23 0827          Discharge Reassessment    Assessment Type Discharge Planning Reassessment     Did the patient's condition or plan change since previous assessment? No     Discharge Plan A Psychiatric hospital     Discharge Plan B Rehab     DME Needed Upon Discharge  wheelchair     Discharge Barriers Identified No family/friends to help     Why the patient remains in the hospital Requires continued medical care        Post-Acute Status    Post-Acute Authorization Placement     Post-Acute Placement Status Referrals Sent     Discharge Delays None known at this time

## 2023-02-05 NOTE — PLAN OF CARE
Problem: Skin Injury Risk Increased  Goal: Skin Health and Integrity  Outcome: Ongoing, Progressing  Intervention: Optimize Skin Protection  Flowsheets (Taken 2/5/2023 0128)  Pressure Reduction Techniques: frequent weight shift encouraged  Pressure Reduction Devices: pressure-redistributing mattress utilized  Skin Protection:   adhesive use limited   incontinence pads utilized  Head of Bed (HOB) Positioning: HOB at 20-30 degrees  Intervention: Promote and Optimize Oral Intake  Flowsheets (Taken 2/5/2023 0128)  Oral Nutrition Promotion: calorie-dense foods provided     Problem: Fall Injury Risk  Goal: Absence of Fall and Fall-Related Injury  Outcome: Ongoing, Progressing  Intervention: Identify and Manage Contributors  Flowsheets (Taken 2/5/2023 0128)  Self-Care Promotion:   BADL personal objects within reach   independence encouraged  Medication Review/Management:   medications reviewed   high-risk medications identified  Intervention: Promote Injury-Free Environment  Flowsheets (Taken 2/5/2023 0128)  Safety Promotion/Fall Prevention:   assistive device/personal item within reach   bed alarm set   side rails raised x 3   nonskid shoes/socks when out of bed     Problem: Infection  Goal: Absence of Infection Signs and Symptoms  Outcome: Ongoing, Progressing  Intervention: Prevent or Manage Infection  Flowsheets (Taken 2/5/2023 0128)  Fever Reduction/Comfort Measures:   lightweight bedding   lightweight clothing  Infection Management: aseptic technique maintained

## 2023-02-05 NOTE — PLAN OF CARE
Problem: Adult Inpatient Plan of Care  Goal: Plan of Care Review  Outcome: Ongoing, Progressing  Flowsheets (Taken 2/5/2023 1724)  Plan of Care Reviewed With: patient  Goal: Patient-Specific Goal (Individualized)  Outcome: Ongoing, Progressing  Goal: Absence of Hospital-Acquired Illness or Injury  Outcome: Ongoing, Progressing  Intervention: Identify and Manage Fall Risk  Flowsheets (Taken 2/5/2023 1724)  Safety Promotion/Fall Prevention:   assistive device/personal item within reach   bed alarm set   nonskid shoes/socks when out of bed   instructed to call staff for mobility  Intervention: Prevent Skin Injury  Flowsheets (Taken 2/5/2023 1724)  Body Position: position changed independently  Skin Protection:   incontinence pads utilized   adhesive use limited  Intervention: Prevent and Manage VTE (Venous Thromboembolism) Risk  Flowsheets (Taken 2/5/2023 1724)  Activity Management: Rolling - L1  Range of Motion: active ROM (range of motion) encouraged  Goal: Optimal Comfort and Wellbeing  Outcome: Ongoing, Progressing  Goal: Readiness for Transition of Care  Outcome: Ongoing, Progressing     Problem: Skin Injury Risk Increased  Goal: Skin Health and Integrity  Outcome: Ongoing, Progressing     Problem: Fall Injury Risk  Goal: Absence of Fall and Fall-Related Injury  Outcome: Ongoing, Progressing     Problem: Infection  Goal: Absence of Infection Signs and Symptoms  Outcome: Ongoing, Progressing     Problem: Fluid and Electrolyte Imbalance (Acute Kidney Injury/Impairment)  Goal: Fluid and Electrolyte Balance  Outcome: Ongoing, Progressing     Problem: Oral Intake Inadequate (Acute Kidney Injury/Impairment)  Goal: Optimal Nutrition Intake  Outcome: Ongoing, Progressing     Problem: Renal Function Impairment (Acute Kidney Injury/Impairment)  Goal: Effective Renal Function  Outcome: Ongoing, Progressing

## 2023-02-06 LAB
ANION GAP SERPL CALC-SCNC: 13 MEQ/L
BASOPHILS # BLD AUTO: 0.03 X10(3)/MCL (ref 0–0.2)
BASOPHILS NFR BLD AUTO: 0.3 %
BUN SERPL-MCNC: 26.3 MG/DL (ref 9.8–20.1)
CALCIUM SERPL-MCNC: 9.3 MG/DL (ref 8.4–10.2)
CHLORIDE SERPL-SCNC: 107 MMOL/L (ref 98–107)
CO2 SERPL-SCNC: 22 MMOL/L (ref 22–29)
CREAT SERPL-MCNC: 1.32 MG/DL (ref 0.55–1.02)
CREAT/UREA NIT SERPL: 20
EOSINOPHIL # BLD AUTO: 0.29 X10(3)/MCL (ref 0–0.9)
EOSINOPHIL NFR BLD AUTO: 2.6 %
ERYTHROCYTE [DISTWIDTH] IN BLOOD BY AUTOMATED COUNT: 17 % (ref 11.5–17)
GFR SERPLBLD CREATININE-BSD FMLA CKD-EPI: 48 MLS/MIN/1.73/M2
GLUCOSE SERPL-MCNC: 118 MG/DL (ref 74–100)
HCT VFR BLD AUTO: 34.5 % (ref 37–47)
HGB BLD-MCNC: 10 GM/DL (ref 12–16)
IMM GRANULOCYTES # BLD AUTO: 0.06 X10(3)/MCL (ref 0–0.04)
IMM GRANULOCYTES NFR BLD AUTO: 0.5 %
LYMPHOCYTES # BLD AUTO: 1.99 X10(3)/MCL (ref 0.6–4.6)
LYMPHOCYTES NFR BLD AUTO: 17.8 %
MCH RBC QN AUTO: 25.8 PG
MCHC RBC AUTO-ENTMCNC: 29 MG/DL (ref 33–36)
MCV RBC AUTO: 88.9 FL (ref 80–94)
MONOCYTES # BLD AUTO: 0.95 X10(3)/MCL (ref 0.1–1.3)
MONOCYTES NFR BLD AUTO: 8.5 %
NEUTROPHILS # BLD AUTO: 7.85 X10(3)/MCL (ref 2.1–9.2)
NEUTROPHILS NFR BLD AUTO: 70.3 %
PLATELET # BLD AUTO: 398 X10(3)/MCL (ref 130–400)
PMV BLD AUTO: 10.6 FL (ref 7.4–10.4)
POTASSIUM SERPL-SCNC: 3.9 MMOL/L (ref 3.5–5.1)
RBC # BLD AUTO: 3.88 X10(6)/MCL (ref 4.2–5.4)
SODIUM SERPL-SCNC: 142 MMOL/L (ref 136–145)
WBC # SPEC AUTO: 11.2 X10(3)/MCL (ref 4.5–11.5)

## 2023-02-06 PROCEDURE — 97110 THERAPEUTIC EXERCISES: CPT

## 2023-02-06 PROCEDURE — 25000003 PHARM REV CODE 250: Performed by: STUDENT IN AN ORGANIZED HEALTH CARE EDUCATION/TRAINING PROGRAM

## 2023-02-06 PROCEDURE — 97130 THER IVNTJ EA ADDL 15 MIN: CPT

## 2023-02-06 PROCEDURE — 11000001 HC ACUTE MED/SURG PRIVATE ROOM

## 2023-02-06 PROCEDURE — 63600175 PHARM REV CODE 636 W HCPCS: Performed by: STUDENT IN AN ORGANIZED HEALTH CARE EDUCATION/TRAINING PROGRAM

## 2023-02-06 PROCEDURE — 85025 COMPLETE CBC W/AUTO DIFF WBC: CPT | Performed by: FAMILY MEDICINE

## 2023-02-06 PROCEDURE — 97530 THERAPEUTIC ACTIVITIES: CPT

## 2023-02-06 PROCEDURE — 94760 N-INVAS EAR/PLS OXIMETRY 1: CPT

## 2023-02-06 PROCEDURE — 97129 THER IVNTJ 1ST 15 MIN: CPT

## 2023-02-06 PROCEDURE — 80048 BASIC METABOLIC PNL TOTAL CA: CPT | Performed by: FAMILY MEDICINE

## 2023-02-06 RX ADMIN — RISPERIDONE 0.5 MG: 0.5 TABLET ORAL at 09:02

## 2023-02-06 RX ADMIN — ASPIRIN 81 MG: 81 TABLET, COATED ORAL at 09:02

## 2023-02-06 RX ADMIN — FOLIC ACID 1 MG: 1 TABLET ORAL at 09:02

## 2023-02-06 RX ADMIN — SERTRALINE 25 MG: 25 TABLET, FILM COATED ORAL at 09:02

## 2023-02-06 RX ADMIN — FAMOTIDINE 20 MG: 20 TABLET, FILM COATED ORAL at 09:02

## 2023-02-06 RX ADMIN — ATORVASTATIN CALCIUM 40 MG: 40 TABLET, FILM COATED ORAL at 08:02

## 2023-02-06 RX ADMIN — LABETALOL HYDROCHLORIDE 200 MG: 200 TABLET, FILM COATED ORAL at 09:02

## 2023-02-06 RX ADMIN — BISACODYL 10 MG: 10 SUPPOSITORY RECTAL at 03:02

## 2023-02-06 RX ADMIN — LABETALOL HYDROCHLORIDE 200 MG: 200 TABLET, FILM COATED ORAL at 08:02

## 2023-02-06 RX ADMIN — LISINOPRIL 20 MG: 10 TABLET ORAL at 09:02

## 2023-02-06 RX ADMIN — ENOXAPARIN SODIUM 40 MG: 40 INJECTION SUBCUTANEOUS at 05:02

## 2023-02-06 RX ADMIN — RISPERIDONE 0.5 MG: 0.5 TABLET ORAL at 08:02

## 2023-02-06 RX ADMIN — NIFEDIPINE 60 MG: 30 TABLET, FILM COATED, EXTENDED RELEASE ORAL at 09:02

## 2023-02-06 RX ADMIN — LEVOTHYROXINE SODIUM 150 MCG: 0.1 TABLET ORAL at 11:02

## 2023-02-06 NOTE — PROGRESS NOTES
Nursing and CNA had just finished giving the pt a bath and assisted pt from the chair back to the bed. PT to return tomorrow.

## 2023-02-06 NOTE — PLAN OF CARE
Spoke with patient's significant other Corby and explained that patient is not participating in physical therapy and not accepted to inpatient behavioral health and is ready for discharge. Will send referral for outpatient behavioral health. Corby verbalized understanding and will pick patient up in the morning.

## 2023-02-06 NOTE — PROGRESS NOTES
Hospital Medicine  Progress Note    Patient Name: Alysia Washington  MRN: [id number]  Status: IP- Inpatient   Admission Date: 1/29/2023  Length of Stay: 6      CC: hospital follow-up for        SUBJECTIVE   Pt exhibiting selective mutism. No records available thus history obtained from record review. Pt presented to the ED complaining of redness, swelling and pain to her left foot that has worsened over the last few months. Boyfriend stated she has not been taking her medication for years and notes that she is been acting differently lately with sluggishness and sometimes seems confused.  In the ED patient with hypertensive urgency as high as 265/154, and profound hypothyroidism with a TSH of 138, T4 less than 0.42 and T3 1.35.  Patient with no concomitant hemodynamic instability or hyponatremia.  Low suspicion for myxedema coma given cortisol level. CT head found pronounced microvascular change and MRI showed left parietal lobe acute to subacute nonhemorrhagic infarct and old lacunar infarcts and severe microangiopathic edema.           Patient admitted for altered mentation secondary to toxic and metabolic causes. She was started on levothyroxine for profound hypothyrodism. Also exhibiting selective mutism. She was evaluated by tele psych who recommend risperidone, sertraline and inpatient psych. Dose of risperidone decreased 2/2 due to somnolence. Poorly controlled hypertension, titrating medications           Min assist x 15 ft. Pt has been refusing therapies.            February 3, 2023, patient is nonverbal, no fever, no shortness for breath     February 4, 2023, patient refused physical therapy, patient refused medication, patient is nonverbal     02/05/2023 appear stable but not talking today   No major change overnight     02/06/2023 talk some today btu still confused  Went to gym today weak but progress  Work on placement     Today: Patient seen and examined at bedside, and chart reviewed.       MEDICATIONS    Scheduled   aspirin  81 mg Oral Daily    atorvastatin  40 mg Oral QHS    enoxaparin  40 mg Subcutaneous Daily    famotidine  20 mg Oral Daily    folic acid  1 mg Oral Daily    labetaloL  200 mg Oral Q12H    levothyroxine  150 mcg Oral Before breakfast    lisinopriL  20 mg Oral Daily    NIFEdipine  60 mg Oral Daily    risperiDONE  0.5 mg Oral BID    sertraline  25 mg Oral Daily     Continuous Infusions        PHYSICAL EXAM   VITALS: T 97.6 °F (36.4 °C)   /75   P 72   RR 18   O2 99 %    GENERAL: Awake and in NAD  LUNGS: CTA B/L  CVS: Normal rate  GI/: Soft, NT, bowel sounds positive.  EXTREMITIES: No peripheral edema  NEURO: AAOx3  PSYCH: Cooperative      LABS   CBC  Recent Labs     02/06/23  0535   WBC 11.2   RBC 3.88*   HGB 10.0*   HCT 34.5*   MCV 88.9   MCH 25.8   MCHC 29.0*   RDW 17.0        CHEM  Recent Labs     02/06/23  0535      K 3.9   CHLORIDE 107   CO2 22   BUN 26.3*   CREATININE 1.32*   GLUCOSE 118*   CALCIUM 9.3         MICROBIOLOGY     Microbiology Results (last 7 days)       ** No results found for the last 168 hours. **              DIAGNOSTICS   MRI Brain Without Contrast  Narrative: EXAMINATION:  MRI BRAIN WITHOUT CONTRAST    CLINICAL HISTORY:  Mental status change, unknown cause;    TECHNIQUE:  Multiplanar MRI sequences were performed of the brain without contrast.    COMPARISON:  CT brain without contrast January 29, 2023    FINDINGS:  There are old lacunar infarcts which involve bilateral basal ganglia and the right corona radiata.  There is severe cerebral leukoencephalopathy which likely is caused by chronic microangiopathic ischemia with extensive periventricular and deep white matter T2 FLAIR hyperintense signals.  There are small old hemorrhagic byproducts which involve the mid aspect of the jenn and bilateral basal ganglia with dephasing artifacts on the gradient echo sequence.  There is left parietal lobe deep white matter small diffusion weighted restriction with  corresponding signal dropout on ADC map on image 19 series 7 which is consistent with lacunar acute to subacute infarct.  The sella and suprasellar areas are unremarkable.    The cerebellar tonsils are normally positioned. There is no acute intracranial hemorrhage, hydrocephalus, midline shift or mass effect. No acute extra axial fluid collections identified. The mastoid air cells are clear.  Impression: 1.  Left parietal lobe small acute to subacute nonhemorrhagic infarct.    2.  Old lacunar infarcts and severe chronic microangiopathic ischemia.    .    Electronically signed by: Luis Enrique Wright  Date:    01/30/2023  Time:    11:43  CT Head Without Contrast  Narrative: EXAMINATION:  CT HEAD WITHOUT CONTRAST    CLINICAL HISTORY:  Dizziness, persistent/recurrent, cardiac or vascular cause suspected;    TECHNIQUE:  Low dose axial images were obtained through the head.  Coronal and sagittal reformations were also performed. Contrast was not administered.    Automatic exposure control was utilized to reduce the patient's radiation dose.    DLP= 1562    COMPARISON:  07/17/2015    FINDINGS:  Hemorrhage: No acute intracranial hemorrhage is seen.    CSF spaces: The ventricles, sulci and basal cisterns all appear somewhat prominent suggesting an element of global cerebral atrophy.    Brain parenchyma: Pronounced microvascular change is seen in portions of the periventricular and deep white matter tracts.    Vascular territory infarcts:    Lacunar infarcts: There is a â?O5.7 mmâ?O lacunar infarct seen on â?OImage 24, Series 601â?O in the right. Corona radiata and adjacent capsuloganglionic region.    Cerebellum: Unremarkable.    Sella and skull base: The sella appears to be within normal limits for age.    Intracranial calcifications: Incidental note is made of bilateral choroid plexus calcification. Incidental note is made of some pineal region calcification.    Calvarium: No acute linear or depressed skull fracture is  seen.    Maxillofacial Structures:    Paranasal sinuses: The visualized paranasal sinuses appear clear with no significant mucoperiosteal thickening or air fluid levels identified.    Orbits: The orbits appear unremarkable.    Zygomatic arches: The zygomatic arches are intact and unremarkable.    Temporal bones and mastoids: The temporal bones and mastoids appear unremarkable.    TMJ: The mandibular condyles appear normally placed with respect to the mandibular fossa.  Impression: Impression:    1. Pronounced microvascular change is seen in portions of the periventricular and deep white matter tracts. Correlate clinically as regards additional evaluation and follow-up possibly with MRI.    2. No acute intracranial process identified. Details and other findings as noted above.    Electronically signed by: Stephen Perry  Date:    01/30/2023  Time:    06:17        ASSESSMENT/PLAN:    Hypothyroidism           -elevated TSH, low T4            -started on levothyroxine 150 mcg            -will need repeat TSH in 6 weeks from 1/30 (on or about 3/13/23)                       AMS (altered mental status)           -metabolic encephalopathy secondary to profound hypothyroidism, illicit drug use and folic acid deficiency           -record review reveals patient has had an inpatient psychiatric admission previously however no notes available           -tele psych recommends inpatient psych           -nonreactive syphilis/HIV, B12 wnl           -folate low--> replete           -risperidone, sertraline                                   Acute kidney insufficiency           -baseline CKD unknown, suspect pt with baseline CKD III                       Essential hypertension           -continue with lisinopril, hydrochlorothiazide, added nifedipine                       Stroke           -CT head found pronounced microvascular change           -MRI found small parietal lobe acute to subacute nonhemorrhagic infarct           -aspirin,  atorvastatin, BP control           -ascvd risk of 72.3%                       Electrolyte abnormality           hypokalemia; replete as required                                    Illicit drug use           -UDS + for amphetamines                                    MDD (major depressive disorder)           -risperidone, sertraline           Waiting for inpatient psych acceptance-[facility] denied patient                 Prophylaxis: maya Reyna, MD  [facility]

## 2023-02-06 NOTE — PT/OT/SLP PROGRESS
Occupational Therapy  Treatment    Name: Alysia Washington    : 1970 (52 y.o.)  MRN: [id number]           TREATMENT SUMMARY AND RECOMMENDATIONS:      OT Date of Treatment: 23  OT Start Time: 900  OT Stop Time: 920  OT Total Time (min): 20 min      Subjective Assessment:   No complaints  Lethargic    Awake, alert, cooperative  Impulsive    Uncooperative  x Flat affect    Agitated  c/o pain    Appropriate  c/o fatigue    Confused x Treated at bedside     Emotionally labile  Treated in gym/dept.      Other:        Therapy Precautions:    Cognitive deficits  Spinal precautions    Collar - hard  Sternal precautions    Collar - soft   TLSO   x Fall risk  LSO    Hip precautions - posterior  Knee immobilizer    Hip precautions - anterior  WBAT    Impaired communication  Partial weightbearing    Oxygen  TTWB    PEG tube  NWB    Visual deficits      Hearing deficits   Other:        Treatment Objectives:     Mobility Training:    Mobility task Assist level Comments    Bed mobility Max A Supine<sidelying<EOB; scooting to EOB for feet to touch the floor   Transfer Max Ax2 Squat pivot t/f from EOB to recliner using GB   Sit to stands transitions Max Ax2 X3reps from EOB; max verbal and tactile cues to facilitate anterior weight shift; unable to transition to full stand   Functional mobility     Sitting balance     Standing balance      Other:        ADL Training:    ADL Assist level Comments    Feeding     Grooming/hygiene     Bathing     Upper body dressing     Lower body dressing     Toileting     Toilet transfer     Adaptive equipment training     Other:           Therapeutic Exercise:   Exercise Sets Reps Comments                               Additional Comments:      Assessment: Patient tolerated session fair. Pt noted with increased alertness this morning.     OT Plan: Continue with POC  Revisions made to plan of care: No    GOALS:   Multidisciplinary Problems       Occupational Therapy Goals          Problem:  Occupational Therapy    Goal Priority Disciplines Outcome Interventions   Occupational Therapy Goal     OT, PT/OT Ongoing, Progressing    Description: Goals to be met by: 2/6/23     Patient will increase functional independence with ADLs by performing:    LE Dressing with Minimal Assistance.  Grooming while standing at sink with Stand-by Assistance.  Toileting from toilet with Minimal Assistance for hygiene and clothing management.   Bathing from  shower chair/bench with Minimal Assistance.  Toilet transfer to toilet with Contact Guard Assistance.  Increased functional strength to 5/5 for ADL.                         Skilled OT Minutes Provided: 20  Communication with Treatment Team:     Equipment recommendations:       At end of treatment, patient remained:  x Up in chair     Up in wheelchair in room    In bed   x With alarm activated    Bed rails up   x Call bell in reach     Family/friends present    Restraints secured properly    In bathroom with CNA/RN notified    In gym with PT/PTA/tech    Nurse aware    Other:

## 2023-02-06 NOTE — PT/OT/SLP PROGRESS
Physical Therapy Treatment Note           Name: Alysia Washington    : 1970 (52 y.o.)  MRN: [id number]           TREATMENT SUMMARY AND RECOMMENDATIONS:    PT Received On: 23  PT Start Time: 935     PT Stop Time: 958  PT Total Time (min): 23 min     Subjective Assessment:   No complaints  Lethargic   x Awake, alert, cooperative  Uncooperative    Agitated  c/o pain    Appropriate x c/o fatigue    Confused  Treated at bedside     Emotionally labile  Treated in gym/dept.    Impulsive  Other:    Flat affect x Treated outside     Therapy Precautions:    Cognitive deficits  Spinal precautions    Collar - hard  Sternal precautions    Collar - soft   TLSO   x Fall risk  LSO    Hip precautions - posterior  Knee immobilizer    Hip precautions - anterior  WBAT    Impaired communication  Partial weightbearing    Oxygen  TTWB    PEG tube  NWB    Visual deficits  Other:    Hearing deficits          Treatment Objectives:     Mobility Training:   Assist level Comments    Bed mobility     Transfer     Gait     Sit to stand transitions MIN  A X 5 reps with PT standing in front, no AD, from recliner.    Sitting balance SBA Lower trunk anterior wt shifting x 5 from edge of recliner   Standing balance      Wheelchair mobility     Car transfer     Other:          Therapeutic Exercise:   Exercise Sets Reps Comments   Seated B LE AROM  10 Hip flexion, hip add isometric, knee ext and ankle PF/DF   Seated thoracic rotation  10    Lumbar/thoracic STM  5'              Additional Comments:  Pt brought outside for therapy with good participation demonstrated. Pt was more alert, cooperative and vocal. ST ended up meeting PT outside to continue tx since pt was demonstrating improved cooperation. PT to continue progressing as able.      Assessment: Patient tolerated session Fair.    PT Plan: Continue per POC  Revisions made to plan of care: No    GOALS:   Multidisciplinary Problems       Physical Therapy Goals          Problem:  Physical Therapy    Goal Priority Disciplines Outcome Goal Variances Interventions   Physical Therapy Goal     PT, PT/OT Ongoing, Progressing     Description: Goals to be met by: Discharge     Patient will increase functional independence with mobility by performin. Supine to sit with Modified [place] and Sit to supine with Modified [place]  2. Sit to stand transfer with Modified [place]  3. Bed to chair transfer with Modified [place] using Rolling Walker  4. Gait  x 175 feet with Modified [place] using Rolling Walker.   5. Increased functional strength to 5/5 for LLE.                         Skilled PT Minutes Provided: 23   Communication with Treatment Team:     Equipment recommendations:       At end of treatment, patient remained:  x Up in chair     Up in wheelchair in room    In bed    With alarm activated    Bed rails up    Call bell in reach     Family/friends present    Restraints secured properly    In bathroom with CNA/RN notified    Nurse aware   x outside with therapist/tech    Other:

## 2023-02-06 NOTE — PLAN OF CARE
Problem: Adult Inpatient Plan of Care  Goal: Plan of Care Review  Outcome: Ongoing, Progressing  Flowsheets (Taken 2/5/2023 2000)  Plan of Care Reviewed With: patient  Goal: Patient-Specific Goal (Individualized)  Outcome: Ongoing, Progressing  Goal: Absence of Hospital-Acquired Illness or Injury  Outcome: Ongoing, Progressing  Intervention: Identify and Manage Fall Risk  Flowsheets (Taken 2/5/2023 2000)  Safety Promotion/Fall Prevention:   assistive device/personal item within reach   bed alarm set   nonskid shoes/socks when out of bed   instructed to call staff for mobility  Intervention: Prevent Skin Injury  Flowsheets(Taken 2/5/2023 2000)  Body Position: position changed independently  Skin Protection:   incontinence pads utilized   adhesive use limited  Intervention: Prevent and Manage VTE (Venous Thromboembolism) Risk  Flowsheets(Taken 2/5/2023 2000)  Activity Management: Rolling - L1  Range of Motion: active ROM (range of motion) encouraged  Goal: Optimal Comfort and Wellbeing  Outcome: Ongoing, Progressing  Goal: Readiness for Transition of Care  Outcome: Ongoing, Progressing     Problem: Skin Injury Risk Increased  Goal: Skin Health and Integrity  Outcome: Ongoing, Progressing     Problem: Fall Injury Risk  Goal: Absence of Fall and Fall-Related Injury  Outcome: Ongoing, Progressing     Problem: Infection  Goal: Absence of Infection Signs and Symptoms  Outcome: Ongoing, Progressing     Problem: Fluid and Electrolyte Imbalance (Acute Kidney Injury/Impairment)  Goal: Fluid and Electrolyte Balance  Outcome: Ongoing, Progressing     Problem: Oral Intake Inadequate (Acute Kidney Injury/Impairment)  Goal: Optimal Nutrition Intake  Outcome: Ongoing, Progressing     Problem: Renal Function Impairment (Acute Kidney Injury/Impairment)  Goal: Effective Renal Function  Outcome: Ongoing, Progressing

## 2023-02-06 NOTE — PT/OT/SLP PROGRESS
"Speech Language Pathology Treatment    Patient Name:  Alysia Washington   MRN:  [id number]  Admitting Diagnosis: Hypothyroidism    Recommendations:                 General Recommendations:  Cognitive-linguistic therapy  Diet recommendations:  low sodium, Liquid Diet Level: Thin liquids - IDDSI Level 0   Aspiration Precautions: HOB to 90 degrees   General Precautions: Standard, fall  Communication strategies:  provide increased time to answer    Subjective     Pt in gerichair outside w/ PT. Pt more alert and cooperative w/ sitting outside. Speech therapy session completed outside.    Patient goals:      Pain/Comfort:       Respiratory Status: Room air    Objective:     Has the patient been evaluated by SLP for swallowing?      Keep patient NPO?     Current Respiratory Status:        Pt providing 1-2 word answered during open ended Qs at beginning of session.  Picture scene recall completed w/ 20% acc SBA, unable to increase acc despite maxA.  Pt easily distracted w/ environmental stimuli while sitting outside( I.e., cars driving in the distance).    SLP verbalized that her weekend was "boring." SLP brought pt 2 word search puzzles at end of session. Pt appreciative.    SLP returned pt back to room via gerichair. Chair locked and chair alarm in place, though batteries appear to be dead. RN present and SLP notified her of findings. RN informed SLP she will look into it and fix it.     Improved mood and participation as compared to previous sessions.  Cont SLP POC.    Assessment:     Alysia Washington is a 52 y.o. female with an SLP diagnosis of Cognitive-Linguistic Impairment.  She presents with impaired orientation, delayed recall, problem solving, sequencing, attention, and initiation. Pt would benefit from continued SLP services to target above stated deficits in order to promote a return to PLOF.    Goals:   Multidisciplinary Problems       SLP Goals          Problem: SLP    Goal Priority Disciplines Outcome   SLP Goal     " SLP Ongoing, Progressing   Description: LTG: Pt will improve cognitive linguistic skills to SBA level to ensure safety upon discharge home.    STG:  Pt will orient x4 [place].  Pt will attend to structured task for 3 minutes given 3 or less redirections/cues.  Pt will follow 3-step commands w/ 80% acc modA.  Pt will provide solutions to common situations w/ 90% acc [place].  Pt will complete 3-4 step sequencing tasks w/ 80% acc modA.                       Plan:     Patient to be seen:   (PRN)   Plan of Care expires:  02/10/23  Plan of Care reviewed with:    SLP Follow-Up:  Yes       Discharge recommendations:      Barriers to Discharge:  Decreased Care Giver Support    Time Tracking:     SLP Treatment Date:   2/6/23  Speech Start Time: 9:55  Speech Stop Time: 10:25    Speech Total Time (min):  30 min    Billable Minutes: Speech Therapy Individual 30 cognition    Taty Bella M.S., CCC-SLP   02/06/2023

## 2023-02-07 VITALS
BODY MASS INDEX: 28.06 KG/M2 | WEIGHT: 174.63 LBS | SYSTOLIC BLOOD PRESSURE: 143 MMHG | DIASTOLIC BLOOD PRESSURE: 83 MMHG | HEART RATE: 71 BPM | RESPIRATION RATE: 18 BRPM | HEIGHT: 66 IN | TEMPERATURE: 98 F | OXYGEN SATURATION: 97 %

## 2023-02-07 LAB
ANION GAP SERPL CALC-SCNC: 13 MEQ/L
BASOPHILS # BLD AUTO: 0.03 X10(3)/MCL (ref 0–0.2)
BASOPHILS NFR BLD AUTO: 0.3 %
BUN SERPL-MCNC: 24.8 MG/DL (ref 9.8–20.1)
CALCIUM SERPL-MCNC: 9.3 MG/DL (ref 8.4–10.2)
CHLORIDE SERPL-SCNC: 109 MMOL/L (ref 98–107)
CO2 SERPL-SCNC: 22 MMOL/L (ref 22–29)
CREAT SERPL-MCNC: 1.29 MG/DL (ref 0.55–1.02)
CREAT/UREA NIT SERPL: 19
EOSINOPHIL # BLD AUTO: 0.38 X10(3)/MCL (ref 0–0.9)
EOSINOPHIL NFR BLD AUTO: 3.2 %
ERYTHROCYTE [DISTWIDTH] IN BLOOD BY AUTOMATED COUNT: 16.8 % (ref 11.5–17)
GFR SERPLBLD CREATININE-BSD FMLA CKD-EPI: 49 MLS/MIN/1.73/M2
GLUCOSE SERPL-MCNC: 118 MG/DL (ref 74–100)
HCT VFR BLD AUTO: 33.1 % (ref 37–47)
HGB BLD-MCNC: 9.7 GM/DL (ref 12–16)
IMM GRANULOCYTES # BLD AUTO: 0.05 X10(3)/MCL (ref 0–0.04)
IMM GRANULOCYTES NFR BLD AUTO: 0.4 %
LYMPHOCYTES # BLD AUTO: 2.35 X10(3)/MCL (ref 0.6–4.6)
LYMPHOCYTES NFR BLD AUTO: 19.8 %
MCH RBC QN AUTO: 25.9 PG
MCHC RBC AUTO-ENTMCNC: 29.3 MG/DL (ref 33–36)
MCV RBC AUTO: 88.5 FL (ref 80–94)
MONOCYTES # BLD AUTO: 0.95 X10(3)/MCL (ref 0.1–1.3)
MONOCYTES NFR BLD AUTO: 8 %
NEUTROPHILS # BLD AUTO: 8.09 X10(3)/MCL (ref 2.1–9.2)
NEUTROPHILS NFR BLD AUTO: 68.3 %
PLATELET # BLD AUTO: 408 X10(3)/MCL (ref 130–400)
PMV BLD AUTO: 10.1 FL (ref 7.4–10.4)
POTASSIUM SERPL-SCNC: 3.9 MMOL/L (ref 3.5–5.1)
RBC # BLD AUTO: 3.74 X10(6)/MCL (ref 4.2–5.4)
SODIUM SERPL-SCNC: 144 MMOL/L (ref 136–145)
WBC # SPEC AUTO: 11.9 X10(3)/MCL (ref 4.5–11.5)

## 2023-02-07 PROCEDURE — 85025 COMPLETE CBC W/AUTO DIFF WBC: CPT | Performed by: FAMILY MEDICINE

## 2023-02-07 PROCEDURE — 25000003 PHARM REV CODE 250: Performed by: STUDENT IN AN ORGANIZED HEALTH CARE EDUCATION/TRAINING PROGRAM

## 2023-02-07 PROCEDURE — 80048 BASIC METABOLIC PNL TOTAL CA: CPT | Performed by: FAMILY MEDICINE

## 2023-02-07 PROCEDURE — 94760 N-INVAS EAR/PLS OXIMETRY 1: CPT

## 2023-02-07 RX ORDER — RISPERIDONE 0.5 MG/1
0.5 TABLET ORAL 2 TIMES DAILY
Qty: 60 TABLET | Refills: 0 | Status: SHIPPED | OUTPATIENT
Start: 2023-02-07 | End: 2024-02-07

## 2023-02-07 RX ORDER — FOLIC ACID 1 MG/1
1 TABLET ORAL DAILY
Qty: 30 TABLET | Refills: 0 | Status: SHIPPED | OUTPATIENT
Start: 2023-02-08 | End: 2024-02-08

## 2023-02-07 RX ORDER — SERTRALINE HYDROCHLORIDE 25 MG/1
25 TABLET, FILM COATED ORAL DAILY
Qty: 30 TABLET | Refills: 0 | Status: SHIPPED | OUTPATIENT
Start: 2023-02-08 | End: 2024-02-08

## 2023-02-07 RX ORDER — LEVOTHYROXINE SODIUM 150 UG/1
150 TABLET ORAL
Qty: 30 TABLET | Refills: 1 | Status: SHIPPED | OUTPATIENT
Start: 2023-02-08 | End: 2024-02-08

## 2023-02-07 RX ORDER — ATORVASTATIN CALCIUM 40 MG/1
40 TABLET, FILM COATED ORAL NIGHTLY
Qty: 90 TABLET | Refills: 1 | Status: SHIPPED | OUTPATIENT
Start: 2023-02-07 | End: 2024-02-07

## 2023-02-07 RX ORDER — LISINOPRIL 20 MG/1
20 TABLET ORAL DAILY
Qty: 90 TABLET | Refills: 0 | Status: SHIPPED | OUTPATIENT
Start: 2023-02-08 | End: 2024-02-08

## 2023-02-07 RX ADMIN — LISINOPRIL 20 MG: 10 TABLET ORAL at 09:02

## 2023-02-07 RX ADMIN — SERTRALINE 25 MG: 25 TABLET, FILM COATED ORAL at 09:02

## 2023-02-07 RX ADMIN — POLYETHYLENE GLYCOL 3350 17 G: 17 POWDER, FOR SOLUTION ORAL at 09:02

## 2023-02-07 RX ADMIN — LEVOTHYROXINE SODIUM 150 MCG: 0.1 TABLET ORAL at 05:02

## 2023-02-07 RX ADMIN — RISPERIDONE 0.5 MG: 0.5 TABLET ORAL at 09:02

## 2023-02-07 RX ADMIN — FOLIC ACID 1 MG: 1 TABLET ORAL at 09:02

## 2023-02-07 RX ADMIN — NIFEDIPINE 60 MG: 30 TABLET, FILM COATED, EXTENDED RELEASE ORAL at 09:02

## 2023-02-07 RX ADMIN — LABETALOL HYDROCHLORIDE 200 MG: 200 TABLET, FILM COATED ORAL at 09:02

## 2023-02-07 RX ADMIN — FAMOTIDINE 20 MG: 20 TABLET, FILM COATED ORAL at 09:02

## 2023-02-07 RX ADMIN — ASPIRIN 81 MG: 81 TABLET, COATED ORAL at 09:02

## 2023-02-07 NOTE — PLAN OF CARE
Problem: Adult Inpatient Plan of Care  Goal: Plan of Care Review  Outcome: Ongoing, Progressing  Flowsheets (Taken 2/6/2023 1907)  Plan of Care Reviewed With: patient  Goal: Patient-Specific Goal (Individualized)  Outcome: Ongoing, Progressing  Flowsheets (Taken 2/6/2023 1907)  Anxieties, Fears or Concerns: pt denies  Individualized Care Needs: assistance with adls  Goal: Absence of Hospital-Acquired Illness or Injury  Outcome: Ongoing, Progressing  Intervention: Identify and Manage Fall Risk  Flowsheets (Taken 2/6/2023 1907)  Safety Promotion/Fall Prevention:   assistive device/personal item within reach   bed alarm set   Fall Risk reviewed with patient/family   side rails raised x 3  Intervention: Prevent Skin Injury  Flowsheets (Taken 2/6/2023 1907)  Body Position:   heels elevated   position changed independently  Skin Protection:   adhesive use limited   incontinence pads utilized  Intervention: Prevent and Manage VTE (Venous Thromboembolism) Risk  Flowsheets (Taken 2/6/2023 1907)  Activity Management: Rolling - L1  VTE Prevention/Management:   ambulation promoted   bleeding risk assessed   bleeding precations maintained   fluids promoted  Range of Motion: active ROM (range of motion) encouraged  Intervention: Prevent Infection  Flowsheets (Taken 2/6/2023 1907)  Infection Prevention:   environmental surveillance performed   personal protective equipment utilized   rest/sleep promoted   single patient room provided   equipment surfaces disinfected   hand hygiene promoted

## 2023-02-07 NOTE — DISCHARGE SUMMARY
Hospital Medicine  Discharge Summary    Patient Name: Alysia Washington  MRN: [id number]  Admit Date: 1/29/2023  Discharge Date:    Status: IP- Inpatient   Length of Stay: 7      PHYSICIANS   Admitting Physician: Thairy G Reyes, DO  Discharging Physician: Celso Reyna MD.  Primary Care Physician: No primary care provider on file.        DISCHARGE DIAGNOSES      Hypothyroidism           -elevated TSH, low T4            -started on levothyroxine 150 mcg            -will need repeat TSH in 6 weeks from 1/30 (on or about 3/13/23)                       AMS (altered mental status)           -metabolic encephalopathy secondary to profound hypothyroidism, illicit drug use and folic acid deficiency           -record review reveals patient has had an inpatient psychiatric admission previously however no notes available           -tele psych recommends inpatient psych           -nonreactive syphilis/HIV, B12 wnl           -folate low--> replete           -risperidone, sertraline                                   Acute kidney insufficiency           -baseline CKD unknown, suspect pt with baseline CKD III                       Essential hypertension           -continue with lisinopril, hydrochlorothiazide, added nifedipine                       Stroke           -CT head found pronounced microvascular change           -MRI found small parietal lobe acute to subacute nonhemorrhagic infarct           -aspirin, atorvastatin, BP control           -ascvd risk of 72.3%                       Electrolyte abnormality           hypokalemia; replete as required                                    Illicit drug use           -UDS + for amphetamines                                    MDD (major depressive disorder)           -risperidone, sertraline     PROCEDURES         HOSPITAL COURSE    Pt exhibiting selective mutism. No records available thus history obtained from record review. Pt presented to the ED complaining of redness, swelling and  pain to her left foot that has worsened over the last few months. Boyfriend stated she has not been taking her medication for years and notes that she is been acting differently lately with sluggishness and sometimes seems confused.  In the ED patient with hypertensive urgency as high as 265/154, and profound hypothyroidism with a TSH of 138, T4 less than 0.42 and T3 1.35.  Patient with no concomitant hemodynamic instability or hyponatremia.  Low suspicion for myxedema coma given cortisol level. CT head found pronounced microvascular change and MRI showed left parietal lobe acute to subacute nonhemorrhagic infarct and old lacunar infarcts and severe microangiopathic edema.           Patient admitted for altered mentation secondary to toxic and metabolic causes. She was started on levothyroxine for profound hypothyrodism. Also exhibiting selective mutism. She was evaluated by tele psych who recommend risperidone, sertraline and inpatient psych. Dose of risperidone decreased 2/2 due to somnolence. Poorly controlled hypertension, titrating medications           Min assist x 15 ft. Pt has been refusing therapies.            February 3, 2023, patient is nonverbal, no fever, no shortness for breath     February 4, 2023, patient refused physical therapy, patient refused medication, patient is nonverbal     02/05/2023 appear stable but not talking today   No major change overnight      02/06/2023 talk some today btu still confused  Went to gym today weak but progress  Work on placement     Synthroid 150 mcg sent to the pharmacy    STATUS  ImprovedStable    DISPOSITION  Discharge to home hh    DIET  Low sodium     ACTIVITY  As tolerated      FOLLOW-UP         DISCHARGE MEDICATION RECONCILIATION        Medication List        START taking these medications      atorvastatin 40 MG tablet  Commonly known as: LIPITOR  Take 1 tablet (40 mg total) by mouth every evening.     folic acid 1 MG tablet  Commonly known as: FOLVITE  Take  1 tablet (1 mg total) by mouth once daily.  Start taking on: February 8, 2023     levothyroxine 150 MCG tablet  Commonly known as: SYNTHROID  Take 1 tablet (150 mcg total) by mouth before breakfast.  Start taking on: February 8, 2023     lisinopriL 20 MG tablet  Commonly known as: PRINIVIL,ZESTRIL  Take 1 tablet (20 mg total) by mouth once daily.  Start taking on: February 8, 2023     risperiDONE 0.5 MG Tab  Commonly known as: RISPERDAL  Take 1 tablet (0.5 mg total) by mouth 2 (two) times daily.     sertraline 25 MG tablet  Commonly known as: ZOLOFT  Take 1 tablet (25 mg total) by mouth once daily.  Start taking on: February 8, 2023               Where to Get Your Medications        These medications were sent to [facility][id number] - [address] AT [address] [address]      Phone: [phone]   atorvastatin 40 MG tablet  folic acid 1 MG tablet  levothyroxine 150 MCG tablet  lisinopriL 20 MG tablet  risperiDONE 0.5 MG Tab  sertraline 25 MG tablet           PHYSICAL EXAM   VITALS: T 98.3 °F (36.8 °C)   BP (!) 159/90   P 76   RR 18   O2 (!) 94 %    Physical Exam  Vitals and nursing note reviewed.   Constitutional:       Appearance: Normal appearance.   Cardiovascular:      Rate and Rhythm: Normal rate.      Heart sounds: Normal heart sounds.   Pulmonary:      Effort: Pulmonary effort is normal.      Breath sounds: Normal breath sounds.   Neurological:      Mental Status: She is alert.          Discharge time: 33 minutes     Celso holguin MD  [facility]       DIAGNOSITCS   CBC:   Recent Labs   Lab 02/06/23  0535 02/07/23  0330   WBC 11.2 11.9*   HGB 10.0* 9.7*   HCT 34.5* 33.1*    408*     COAG:  No results for input(s): APTT, INR, PTT in the last 168 hours.  CMP:   Recent Labs   Lab 02/01/23  0330 02/06/23  0535 02/07/23  0330   CALCIUM 9.2 9.3 9.3    142 144   K 3.6 3.9 3.9   CO2 26 22 22   BUN 21.0* 26.3* 24.8*    CREATININE 1.56* 1.32* 1.29*     Estimated Creatinine Clearance: 53 mL/min (A) (based on SCr of 1.29 mg/dL (H)).  CARDIAC ENZYMES: No results for input(s): TROPONINI, CPK, CKMB in the last 72 hours.     No results for input(s): AMYLASE, LIPASE in the last 168 hours.      No results for input(s): POCTGLUCOSE in the last 72 hours.     Microbiology Results (last 7 days)       ** No results found for the last 168 hours. **               CT Head Without Contrast    Result Date: 1/30/2023  EXAMINATION: CT HEAD WITHOUT CONTRAST CLINICAL HISTORY: Dizziness, persistent/recurrent, cardiac or vascular cause suspected; TECHNIQUE: Low dose axial images were obtained through the head.  Coronal and sagittal reformations were also performed. Contrast was not administered. Automatic exposure control was utilized to reduce the patient's radiation dose. DLP= [id number] COMPARISON: 07/17/2015 FINDINGS: Hemorrhage: No acute intracranial hemorrhage is seen. CSF spaces: The ventricles, sulci and basal cisterns all appear somewhat prominent suggesting an element of global cerebral atrophy. Brain parenchyma: Pronounced microvascular change is seen in portions of the periventricular and deep white matter tracts. Vascular territory infarcts: Lacunar infarcts: There is a â?O5.7 mmâ?O lacunar infarct seen on â?OImage 24, Series 601â?O in the right. Corona radiata and adjacent capsuloganglionic region. Cerebellum: Unremarkable. Sella and skull base: The sella appears to be within normal limits for age. Intracranial calcifications: Incidental note is made of bilateral choroid plexus calcification. Incidental note is made of some pineal region calcification. Calvarium: No acute linear or depressed skull fracture is seen. Maxillofacial Structures: Paranasal sinuses: The visualized paranasal sinuses appear clear with no significant mucoperiosteal thickening or air fluid levels identified. Orbits: The orbits appear unremarkable. Zygomatic arches: The  zygomatic arches are intact and unremarkable. Temporal bones and mastoids: The temporal bones and mastoids appear unremarkable. TMJ: The mandibular condyles appear normally placed with respect to the mandibular fossa.     Impression: 1. Pronounced microvascular change is seen in portions of the periventricular and deep white matter tracts. Correlate clinically as regards additional evaluation and follow-up possibly with MRI. 2. No acute intracranial process identified. Details and other findings as noted above. Electronically signed by: Stephen Perry Date:    01/30/2023 Time:    06:17    MRI Brain Without Contrast    Result Date: 1/30/2023  EXAMINATION: MRI BRAIN WITHOUT CONTRAST CLINICAL HISTORY: Mental status change, unknown cause; TECHNIQUE: Multiplanar MRI sequences were performed of the brain without contrast. COMPARISON: CT brain without contrast January 29, 2023 FINDINGS: There are old lacunar infarcts which involve bilateral basal ganglia and the right corona radiata.  There is severe cerebral leukoencephalopathy which likely is caused by chronic microangiopathic ischemia with extensive periventricular and deep white matter T2 FLAIR hyperintense signals.  There are small old hemorrhagic byproducts which involve the mid aspect of the jenn and bilateral basal ganglia with dephasing artifacts on the gradient echo sequence.  There is left parietal lobe deep white matter small diffusion weighted restriction with corresponding signal dropout on ADC map on image 19 series 7 which is consistent with lacunar acute to subacute infarct.  The sella and suprasellar areas are unremarkable. The cerebellar tonsils are normally positioned. There is no acute intracranial hemorrhage, hydrocephalus, midline shift or mass effect. No acute extra axial fluid collections identified. The mastoid air cells are clear.     1.  Left parietal lobe small acute to subacute nonhemorrhagic infarct. 2.  Old lacunar infarcts and severe  chronic microangiopathic ischemia. . Electronically signed by: Luis Enrique Wright Date:    01/30/2023 Time:    11:43

## 2023-02-07 NOTE — PLAN OF CARE
Problem: Adult Inpatient Plan of Care  Goal: Plan of Care Review  Outcome: Adequate for Care Transition  Goal: Patient-Specific Goal (Individualized)  Outcome: Adequate for Care Transition  Goal: Absence of Hospital-Acquired Illness or Injury  Outcome: Adequate for Care Transition  Goal: Optimal Comfort and Wellbeing  Outcome: Adequate for Care Transition  Goal: Readiness for Transition of Care  Outcome: Adequate for Care Transition     Problem: Skin Injury Risk Increased  Goal: Skin Health and Integrity  Outcome: Adequate for Care Transition     Problem: Fall Injury Risk  Goal: Absence of Fall and Fall-Related Injury  Outcome: Adequate for Care Transition     Problem: Infection  Goal: Absence of Infection Signs and Symptoms  Outcome: Adequate for Care Transition     Problem: Fluid and Electrolyte Imbalance (Acute Kidney Injury/Impairment)  Goal: Fluid and Electrolyte Balance  Outcome: Adequate for Care Transition     Problem: Oral Intake Inadequate (Acute Kidney Injury/Impairment)  Goal: Optimal Nutrition Intake  Outcome: Adequate for Care Transition     Problem: Renal Function Impairment (Acute Kidney Injury/Impairment)  Goal: Effective Renal Function  Outcome: Adequate for Care Transition

## 2023-02-07 NOTE — PROGRESS NOTES
"Inpatient Nutrition Evaluation    Admit Date: 1/29/2023   Total duration of encounter: 9 days    Nutrition Recommendation/Prescription     Continue low sodium diet    Nutrition Assessment     Chart Review    Reason Seen: follow up    Malnutrition Screening Tool Results   Have you recently lost weight without trying?: No  Have you been eating poorly because of a decreased appetite?: No   MST Score: 0     Diagnosis:    Hypothyroidism 1/30/2023      Acute kidney insufficiency 1/30/2023      Essential hypertension 1/30/2023      AMS (altered mental status) 1/30/2023      Stroke 1/30/2023      Illicit drug use 1/31/2023      Electrolyte abnormality 1/31/2023      MDD (major depressive disorder)      Relevant Medical History:     Nutrition-Related Medications: atorvastatin, folic acid, levothyroxine,     Nutrition-Related Labs:   Latest Reference Range & Units 02/07/23 03:30   Sodium 136 - 145 mmol/L 144   Potassium 3.5 - 5.1 mmol/L 3.9   Chloride 98 - 107 mmol/L 109 (H)   CO2 22 - 29 mmol/L 22   Anion Gap mEq/L 13.0   BUN 9.8 - 20.1 mg/dL 24.8 (H)   Creatinine 0.55 - 1.02 mg/dL 1.29 (H)   BUN/CREAT RATIO  19   eGFR mls/min/1.73/m2 49   Glucose 74 - 100 mg/dL 118 (H)   Calcium 8.4 - 10.2 mg/dL 9.3   (H): Data is abnormally high    Diet Order: Diet Low Sodium, 2gm  Oral Supplement Order: none  Appetite/Oral Intake: good/% of meals  Factors Affecting Nutritional Intake: none identified  Food/Jehovah's witness/Cultural Preferences: none reported  Food Allergies: none reported    Skin Integrity: intact  Wound(s):       Comments    Pt intake is good, 75% of meal    Anthropometrics    Height: 5' 6" (167.6 cm) Height Method: Stated  Last Weight: 79.2 kg (174 lb 9.7 oz) (02/06/23 0500) Weight Method: Bed Scale  BMI (Calculated): 28.2  BMI Classification: overweight (BMI 25-29.9)     Ideal Body Weight (IBW), Female: 130 lb     % Ideal Body Weight, Female (lb): 145 %                             Usual Weight Provided By: unable to " obtain usual weight    Wt Readings from Last 3 Encounters:   02/06/23 0500 79.2 kg (174 lb 9.7 oz)   01/30/23 0018 85.5 kg (188 lb 7.9 oz)   01/29/23 1955 68 kg (150 lb)   05/04/15 1403 71.7 kg (157 lb 15.7 oz)      Weight Change(s) Since Admission:  Admit Weight: 68 kg (150 lb) (01/29/23 1955)      Patient Education    Not applicable.    Monitoring & Evaluation     Dietitian will monitor food and beverage intake, weight, weight change, electrolyte/renal panel, glucose/endocrine profile, and gastrointestinal profile.  Nutrition Risk/Follow-Up: low (follow-up in 5-7 days)  Patients assigned 'low nutrition risk' status do not qualify for a full nutritional assessment but will be monitored and re-evaluated in a 5-7 day time period. Please consult if re-evaluation needed sooner.

## 2023-02-07 NOTE — PLAN OF CARE
Problem: Adult Inpatient Plan of Care  Goal: Plan of Care Review  Outcome: Ongoing, Progressing  Goal: Patient-Specific Goal (Individualized)  Outcome: Ongoing, Progressing  Goal: Absence of Hospital-Acquired Illness or Injury  Outcome: Ongoing, Progressing  Intervention: Identify and Manage Fall Risk  Flowsheets (Taken 2/6/2023 1901)  Safety Promotion/Fall Prevention:   assistive device/personal item within reach   bed alarm set   medications reviewed   nonskid shoes/socks when out of bed   instructed to call staff for mobility  Intervention: Prevent Skin Injury  Flowsheets (Taken 2/6/2023 1901)  Body Position:   heels elevated   sitting up in bed   position changed independently   weight shifting  Skin Protection:   adhesive use limited   incontinence pads utilized  Intervention: Prevent and Manage VTE (Venous Thromboembolism) Risk  Flowsheets (Taken 2/6/2023 1901)  Activity Management:   Rolling - L1   Standing - L3   Sitting at edge of bed - L2  VTE Prevention/Management:   ambulation promoted   bleeding precations maintained   bleeding risk assessed  Range of Motion: active ROM (range of motion) encouraged  Intervention: Prevent Infection  Flowsheets (Taken 2/6/2023 1901)  Infection Prevention:   cohorting utilized   hand hygiene promoted   single patient room provided  Goal: Optimal Comfort and Wellbeing  Outcome: Ongoing, Progressing  Intervention: Monitor Pain and Promote Comfort  Flowsheets (Taken 2/6/2023 1901)  Pain Management Interventions:   medication offered   position adjusted   pillow support provided   quiet environment facilitated   care clustered  Intervention: Provide Person-Centered Care  Flowsheets (Taken 2/6/2023 1901)  Trust Relationship/Rapport:   care explained   choices provided   questions answered  Goal: Readiness for Transition of Care  Outcome: Ongoing, Progressing     Problem: Skin Injury Risk Increased  Goal: Skin Health and Integrity  Outcome: Ongoing, Progressing     Problem:  Fall Injury Risk  Goal: Absence of Fall and Fall-Related Injury  Outcome: Ongoing, Progressing     Problem: Infection  Goal: Absence of Infection Signs and Symptoms  Outcome: Ongoing, Progressing  Intervention: Prevent or Manage Infection  Flowsheets (Taken 2/6/2023 1901)  Infection Management: aseptic technique maintained  Isolation Precautions: protective     Problem: Fluid and Electrolyte Imbalance (Acute Kidney Injury/Impairment)  Goal: Fluid and Electrolyte Balance  Outcome: Ongoing, Progressing     Problem: Oral Intake Inadequate (Acute Kidney Injury/Impairment)  Goal: Optimal Nutrition Intake  Outcome: Ongoing, Progressing     Problem: Renal Function Impairment (Acute Kidney Injury/Impairment)  Goal: Effective Renal Function  Outcome: Ongoing, Progressing

## 2023-02-07 NOTE — PROGRESS NOTES
Patient discharged home @1345 with significant other and all belongings including her wallet (gave to her significant other Corby), wheelchair (no leg rests here), via private vehicle. AVS printed and given to patient and significant other. Discharge instructions including f/u appts and medications reviewed with pt and significant other, all questions answered. [facility]/U w/[facility] [place] ([facility] will call pt to schedule), f/u w/ Jose David DSOUZA on 2/24/23, and to be followed by [facility], spoke to Kira at [facility]. Patient's IV d/c'd prior to discharge. Patient on room air, vs stable, patient in no distress at discharge. Patient assisted into private vehicle with therapy assistance from patient's own w/c, significant other packed her w/c into vehicle. Signicant other to  new prescriptions from pharmacy on way home.

## 2023-02-07 NOTE — PLAN OF CARE
Problem: SLP  Goal: SLP Goal  Description: LTG: Pt will improve cognitive linguistic skills to SBA level to ensure safety upon discharge home.    STG:  Pt will orient x4 Logan. Not met  Pt will attend to structured task for 3 minutes given 3 or less redirections/cues. Not met  Pt will follow 3-step commands w/ 80% acc modA. Not met  Pt will provide solutions to common situations w/ 90% acc [place]. Not met  Pt will complete 3-4 step sequencing tasks w/ 80% acc modA. Not met  2/7/2023 1131 by Taty Bella CCC-SLP  Outcome: Adequate for Care Transition

## 2023-02-07 NOTE — PLAN OF CARE
[facility]  Discharge Reassessment    Primary Care Provider: No primary care provider on file.    Expected Discharge Date:     Reassessment (most recent)       Discharge Reassessment - 02/06/23 1819          Discharge Reassessment    Assessment Type Discharge Planning Reassessment     Did the patient's condition or plan change since previous assessment? No     Communicated LUIS ANTONIO with patient/caregiver Date not available/Unable to determine     Discharge Plan A Home with family     DME Needed Upon Discharge  none     Discharge Barriers Identified None     Why the patient remains in the hospital Requires continued medical care        Post-Acute Status    Post-Acute Authorization Home Health     Home Health Status Pending medical clearance/testing     Hospital Resources/Appts/Education Provided Provided patient/caregiver with written discharge plan information     Discharge Delays None known at this time

## 2023-02-08 ENCOUNTER — PATIENT OUTREACH (OUTPATIENT)
Dept: ADMINISTRATIVE | Facility: CLINIC | Age: 53
End: 2023-02-08
Payer: MEDICAID

## 2023-02-08 NOTE — PROGRESS NOTES
C3 nurse attempted to contact Alysia Washington  for a TCC post hospital discharge follow up call. No answer. Left voicemail with callback information. The patient has a scheduled HOSFU appointment with LIANNA Paul (Family Medicine) on 2/24/23 @ 1:00 p.m.

## 2023-02-09 NOTE — PROGRESS NOTES
2nd attempt C3 nurse attempted to contact Alysia S Felix  for a TCC post hospital discharge follow up call. No answer. Left voicemail with callback information. The patient has a scheduled HOS appointment with LIANNA Paul (Family Medicine) on 2/24/23 @ 1:00 p.m.

## 2023-02-09 NOTE — PROGRESS NOTES
3rd attempt C3 nurse attempted to contact Alysia S Felix  for a TCC post hospital discharge follow up call. No answer. Left voicemail with callback information. The patient has a scheduled HOS appointment with LIANNA Paul (Family Medicine) on 2/24/23 @ 1:00 p.m.

## 2023-02-09 NOTE — PT/OT/SLP DISCHARGE
Physical Therapy Discharge Summary    Name: Alysia Washington  MRN: [id number]   Principal Problem: Hypothyroidism     Patient Discharged from acute Physical Therapy on 23.  Please refer to prior PT noted date on 23 for functional status.     Assessment:     Patient appropriate for care in another setting.    Objective:     GOALS:   Multidisciplinary Problems       Physical Therapy Goals          Problem: Physical Therapy    Goal Priority Disciplines Outcome Goal Variances Interventions   Physical Therapy Goal     PT, PT/OT Adequate for Care Transition     Description: Goals to be met by: Discharge     Patient will increase functional independence with mobility by performin. Supine to sit with Modified [place] and Sit to supine with Modified [place]  2. Sit to stand transfer with Modified [place]  3. Bed to chair transfer with Modified [place] using Rolling Walker  4. Gait  x 175 feet with Modified [place] using Rolling Walker.   5. Increased functional strength to 5/5 for LLE.                         Reasons for Discontinuation of Therapy Services  Transfer to alternate level of care.      Plan:     Patient Discharged to: Home with Home Health Service.      2023

## 2023-02-10 NOTE — PLAN OF CARE
[facility]  Discharge Final Note    Primary Care Provider: LIANNA Paul    Expected Discharge Date: 2/7/2023    Final Discharge Note (most recent)       Final Note - 02/10/23 1319          Final Note    Assessment Type Final Discharge Note     Anticipated Discharge Disposition Home-Health Care [facility] [place] [place]     What phone number can be called within the next 1-3 days to see how you are doing after discharge? [phone]     Hospital Resources/Appts/Education Provided Provided patient/caregiver with written discharge plan information        Post-Acute Status    Post-Acute Authorization Home Health     Home Health Status Set-up Complete/Auth obtained     Discharge Delays None known at this time                     Important Message from Medicare             Contact Info [facility] [address] [place] [address]   Phone: [phone]       Next Steps: Follow up    Instructions: Outpatient department will call patient to set up follow-up appt    [facility]   Specialty: Home Health Services    [address] [place] [address]. [address]  [facility] [address]   Phone: [phone]       Next Steps: Follow up    LIANNA Paul   Specialty: Family Medicine   Relationship: PCP - General    [facility]  [address]  [place] [address]   Phone: [phone]       Next Steps: Go on 2/24/2023    Instructions: Will need new patient appt  2/24/23 @ 1:00 p.m. Spoke to Taylor

## 2023-02-24 ENCOUNTER — TELEPHONE (OUTPATIENT)
Dept: FAMILY MEDICINE | Facility: CLINIC | Age: 53
End: 2023-02-24

## 2023-02-24 NOTE — TELEPHONE ENCOUNTER
----- [facility] at 2/24/2023 11:21 AM CST -----  Regarding: need earlier appt  .Type:  Sooner Apoointment Request    Caller is requesting a sooner appointment.  Caller declined first available appointment listed below.  Caller will not accept being placed on the waitlist and is requesting a message be sent to doctor.  Name of Caller: pt's boyfriend (Corby Vázquez)    When is the first available appointment? 3-14-23    Symptoms: pt was in the hospital for a week     Would the patient rather a call back? Yes    Best Call Back Number: [phone]      Additional Information: pt's boyfriend said she cant walk and she urinates on herself and he works all day and come home she is full of urine she missed today's appt she maybe needs rehab he is only off on Monday or Tuesday he is considering a nursing home with the doctors help

## 2023-02-25 ENCOUNTER — HOSPITAL ENCOUNTER (OUTPATIENT)
Facility: HOSPITAL | Age: 53
Discharge: OTHER INSTITUTION | End: 2023-02-26
Attending: GENERAL PRACTICE | Admitting: GENERAL PRACTICE
Payer: MEDICAID

## 2023-02-25 DIAGNOSIS — R41.82 ALTERED MENTAL STATUS, UNSPECIFIED ALTERED MENTAL STATUS TYPE: Primary | ICD-10-CM

## 2023-02-25 DIAGNOSIS — R41.82 AMS (ALTERED MENTAL STATUS): ICD-10-CM

## 2023-02-25 DIAGNOSIS — R07.9 CHEST PAIN: ICD-10-CM

## 2023-02-25 LAB
ALBUMIN SERPL-MCNC: 3.3 G/DL (ref 3.5–5)
ALBUMIN/GLOB SERPL: 0.7 RATIO (ref 1.1–2)
ALP SERPL-CCNC: 133 UNIT/L (ref 40–150)
ALT SERPL-CCNC: 16 UNIT/L (ref 0–55)
AMPHET UR QL SCN: NEGATIVE
APPEARANCE UR: CLEAR
AST SERPL-CCNC: 18 UNIT/L (ref 5–34)
BACTERIA #/AREA URNS AUTO: NORMAL /HPF
BARBITURATE SCN PRESENT UR: NEGATIVE
BASOPHILS # BLD AUTO: 0.03 X10(3)/MCL (ref 0–0.2)
BASOPHILS NFR BLD AUTO: 0.2 %
BENZODIAZ UR QL SCN: NEGATIVE
BILIRUB UR QL STRIP.AUTO: NEGATIVE MG/DL
BILIRUBIN DIRECT+TOT PNL SERPL-MCNC: 0.3 MG/DL
BUN SERPL-MCNC: 24.8 MG/DL (ref 9.8–20.1)
CALCIUM SERPL-MCNC: 9.8 MG/DL (ref 8.4–10.2)
CANNABINOIDS UR QL SCN: NEGATIVE
CHLORIDE SERPL-SCNC: 103 MMOL/L (ref 98–107)
CO2 SERPL-SCNC: 27 MMOL/L (ref 22–29)
COCAINE UR QL SCN: NEGATIVE
COLOR UR AUTO: YELLOW
CREAT SERPL-MCNC: 1.42 MG/DL (ref 0.55–1.02)
EOSINOPHIL # BLD AUTO: 0.54 X10(3)/MCL (ref 0–0.9)
EOSINOPHIL NFR BLD AUTO: 4.2 %
ERYTHROCYTE [DISTWIDTH] IN BLOOD BY AUTOMATED COUNT: 16.4 % (ref 11.5–17)
GFR SERPLBLD CREATININE-BSD FMLA CKD-EPI: 44 MLS/MIN/1.73/M2
GLOBULIN SER-MCNC: 4.8 GM/DL (ref 2.4–3.5)
GLUCOSE SERPL-MCNC: 169 MG/DL (ref 74–100)
GLUCOSE UR QL STRIP.AUTO: NEGATIVE MG/DL
HCT VFR BLD AUTO: 33.2 % (ref 37–47)
HGB BLD-MCNC: 10.4 G/DL (ref 12–16)
IMM GRANULOCYTES # BLD AUTO: 0.1 X10(3)/MCL (ref 0–0.04)
IMM GRANULOCYTES NFR BLD AUTO: 0.8 %
KETONES UR QL STRIP.AUTO: NEGATIVE MG/DL
LEUKOCYTE ESTERASE UR QL STRIP.AUTO: NEGATIVE UNIT/L
LYMPHOCYTES # BLD AUTO: 1.83 X10(3)/MCL (ref 0.6–4.6)
LYMPHOCYTES NFR BLD AUTO: 14.3 %
MCH RBC QN AUTO: 26.2 PG
MCHC RBC AUTO-ENTMCNC: 31.3 G/DL (ref 33–36)
MCV RBC AUTO: 83.6 FL (ref 80–94)
MONOCYTES # BLD AUTO: 0.94 X10(3)/MCL (ref 0.1–1.3)
MONOCYTES NFR BLD AUTO: 7.3 %
NEUTROPHILS # BLD AUTO: 9.38 X10(3)/MCL (ref 2.1–9.2)
NEUTROPHILS NFR BLD AUTO: 73.2 %
NITRITE UR QL STRIP.AUTO: NEGATIVE
OPIATES UR QL SCN: POSITIVE
PCP UR QL: NEGATIVE
PH UR STRIP.AUTO: 5.5 [PH]
PH UR: 5.5 [PH] (ref 3–11)
PLATELET # BLD AUTO: 487 X10(3)/MCL (ref 130–400)
PMV BLD AUTO: 9.6 FL (ref 7.4–10.4)
POTASSIUM SERPL-SCNC: 3.5 MMOL/L (ref 3.5–5.1)
PROT SERPL-MCNC: 8.1 GM/DL (ref 6.4–8.3)
PROT UR QL STRIP.AUTO: NEGATIVE MG/DL
RBC # BLD AUTO: 3.97 X10(6)/MCL (ref 4.2–5.4)
RBC #/AREA URNS AUTO: NORMAL /HPF
RBC UR QL AUTO: ABNORMAL UNIT/L
SODIUM SERPL-SCNC: 142 MMOL/L (ref 136–145)
SP GR UR STRIP.AUTO: >=1.03
SPECIFIC GRAVITY, URINE AUTO (.000) (OHS): >=1.03 (ref 1–1.03)
SQUAMOUS #/AREA URNS AUTO: NORMAL /HPF
T4 FREE SERPL-MCNC: 1.23 NG/DL (ref 0.7–1.48)
TSH SERPL-ACNC: 3.22 UIU/ML (ref 0.35–4.94)
UROBILINOGEN UR STRIP-ACNC: 0.2 MG/DL
WBC # SPEC AUTO: 12.8 X10(3)/MCL (ref 4.5–11.5)
WBC #/AREA URNS AUTO: NORMAL /HPF

## 2023-02-25 PROCEDURE — 84443 ASSAY THYROID STIM HORMONE: CPT | Performed by: GENERAL PRACTICE

## 2023-02-25 PROCEDURE — 96361 HYDRATE IV INFUSION ADD-ON: CPT

## 2023-02-25 PROCEDURE — 25000003 PHARM REV CODE 250: Performed by: STUDENT IN AN ORGANIZED HEALTH CARE EDUCATION/TRAINING PROGRAM

## 2023-02-25 PROCEDURE — G0378 HOSPITAL OBSERVATION PER HR: HCPCS

## 2023-02-25 PROCEDURE — 93010 EKG 12-LEAD: ICD-10-PCS | Mod: ,,, | Performed by: INTERNAL MEDICINE

## 2023-02-25 PROCEDURE — 25000003 PHARM REV CODE 250: Performed by: NURSE PRACTITIONER

## 2023-02-25 PROCEDURE — 25000003 PHARM REV CODE 250: Performed by: GENERAL PRACTICE

## 2023-02-25 PROCEDURE — 63600175 PHARM REV CODE 636 W HCPCS: Performed by: STUDENT IN AN ORGANIZED HEALTH CARE EDUCATION/TRAINING PROGRAM

## 2023-02-25 PROCEDURE — 84439 ASSAY OF FREE THYROXINE: CPT | Performed by: GENERAL PRACTICE

## 2023-02-25 PROCEDURE — 94760 N-INVAS EAR/PLS OXIMETRY 1: CPT

## 2023-02-25 PROCEDURE — 96374 THER/PROPH/DIAG INJ IV PUSH: CPT

## 2023-02-25 PROCEDURE — 81001 URINALYSIS AUTO W/SCOPE: CPT | Mod: 59 | Performed by: GENERAL PRACTICE

## 2023-02-25 PROCEDURE — 80307 DRUG TEST PRSMV CHEM ANLYZR: CPT | Performed by: GENERAL PRACTICE

## 2023-02-25 PROCEDURE — 85025 COMPLETE CBC W/AUTO DIFF WBC: CPT | Performed by: GENERAL PRACTICE

## 2023-02-25 PROCEDURE — 96375 TX/PRO/DX INJ NEW DRUG ADDON: CPT

## 2023-02-25 PROCEDURE — 99285 EMERGENCY DEPT VISIT HI MDM: CPT | Mod: 25

## 2023-02-25 PROCEDURE — 96372 THER/PROPH/DIAG INJ SC/IM: CPT | Mod: 59 | Performed by: STUDENT IN AN ORGANIZED HEALTH CARE EDUCATION/TRAINING PROGRAM

## 2023-02-25 PROCEDURE — 97165 OT EVAL LOW COMPLEX 30 MIN: CPT

## 2023-02-25 PROCEDURE — 93005 ELECTROCARDIOGRAM TRACING: CPT

## 2023-02-25 PROCEDURE — 80053 COMPREHEN METABOLIC PANEL: CPT | Performed by: GENERAL PRACTICE

## 2023-02-25 PROCEDURE — 93010 ELECTROCARDIOGRAM REPORT: CPT | Mod: ,,, | Performed by: INTERNAL MEDICINE

## 2023-02-25 RX ORDER — ATORVASTATIN CALCIUM 40 MG/1
40 TABLET, FILM COATED ORAL NIGHTLY
Status: DISCONTINUED | OUTPATIENT
Start: 2023-02-25 | End: 2023-02-26 | Stop reason: HOSPADM

## 2023-02-25 RX ORDER — LABETALOL HCL 20 MG/4 ML
10 SYRINGE (ML) INTRAVENOUS 4 TIMES DAILY PRN
Status: DISCONTINUED | OUTPATIENT
Start: 2023-02-25 | End: 2023-02-26 | Stop reason: HOSPADM

## 2023-02-25 RX ORDER — SODIUM CHLORIDE, SODIUM LACTATE, POTASSIUM CHLORIDE, CALCIUM CHLORIDE 600; 310; 30; 20 MG/100ML; MG/100ML; MG/100ML; MG/100ML
INJECTION, SOLUTION INTRAVENOUS CONTINUOUS
Status: DISCONTINUED | OUTPATIENT
Start: 2023-02-25 | End: 2023-02-25

## 2023-02-25 RX ORDER — ATORVASTATIN CALCIUM 40 MG/1
40 TABLET, FILM COATED ORAL NIGHTLY
Status: DISCONTINUED | OUTPATIENT
Start: 2023-02-25 | End: 2023-02-25

## 2023-02-25 RX ORDER — LISINOPRIL 10 MG/1
20 TABLET ORAL DAILY
Status: DISCONTINUED | OUTPATIENT
Start: 2023-02-25 | End: 2023-02-25

## 2023-02-25 RX ORDER — ENOXAPARIN SODIUM 100 MG/ML
40 INJECTION SUBCUTANEOUS EVERY 24 HOURS
Status: DISCONTINUED | OUTPATIENT
Start: 2023-02-25 | End: 2023-02-26 | Stop reason: HOSPADM

## 2023-02-25 RX ORDER — TALC
9 POWDER (GRAM) TOPICAL NIGHTLY PRN
Status: DISCONTINUED | OUTPATIENT
Start: 2023-02-25 | End: 2023-02-26 | Stop reason: HOSPADM

## 2023-02-25 RX ORDER — ONDANSETRON 2 MG/ML
4 INJECTION INTRAMUSCULAR; INTRAVENOUS EVERY 8 HOURS PRN
Status: DISCONTINUED | OUTPATIENT
Start: 2023-02-25 | End: 2023-02-26 | Stop reason: HOSPADM

## 2023-02-25 RX ORDER — MAG HYDROX/ALUMINUM HYD/SIMETH 200-200-20
30 SUSPENSION, ORAL (FINAL DOSE FORM) ORAL 4 TIMES DAILY PRN
Status: DISCONTINUED | OUTPATIENT
Start: 2023-02-25 | End: 2023-02-26 | Stop reason: HOSPADM

## 2023-02-25 RX ORDER — POLYETHYLENE GLYCOL 3350 17 G/17G
17 POWDER, FOR SOLUTION ORAL 3 TIMES DAILY PRN
Status: DISCONTINUED | OUTPATIENT
Start: 2023-02-25 | End: 2023-02-26 | Stop reason: HOSPADM

## 2023-02-25 RX ORDER — BISACODYL 10 MG
10 SUPPOSITORY, RECTAL RECTAL DAILY PRN
Status: DISCONTINUED | OUTPATIENT
Start: 2023-02-25 | End: 2023-02-26 | Stop reason: HOSPADM

## 2023-02-25 RX ORDER — LISINOPRIL 10 MG/1
20 TABLET ORAL DAILY
Status: DISCONTINUED | OUTPATIENT
Start: 2023-02-25 | End: 2023-02-26 | Stop reason: HOSPADM

## 2023-02-25 RX ORDER — MORPHINE SULFATE 4 MG/ML
2 INJECTION, SOLUTION INTRAMUSCULAR; INTRAVENOUS EVERY 6 HOURS PRN
Status: DISCONTINUED | OUTPATIENT
Start: 2023-02-25 | End: 2023-02-26 | Stop reason: HOSPADM

## 2023-02-25 RX ORDER — LABETALOL HYDROCHLORIDE 5 MG/ML
10 INJECTION, SOLUTION INTRAVENOUS
Status: COMPLETED | OUTPATIENT
Start: 2023-02-25 | End: 2023-02-25

## 2023-02-25 RX ORDER — RISPERIDONE 0.5 MG/1
0.5 TABLET ORAL 2 TIMES DAILY
Status: DISCONTINUED | OUTPATIENT
Start: 2023-02-25 | End: 2023-02-25

## 2023-02-25 RX ORDER — ASPIRIN 81 MG/1
81 TABLET ORAL DAILY
Status: DISCONTINUED | OUTPATIENT
Start: 2023-02-25 | End: 2023-02-26 | Stop reason: HOSPADM

## 2023-02-25 RX ORDER — LABETALOL 200 MG/1
200 TABLET, FILM COATED ORAL EVERY 12 HOURS
Status: DISCONTINUED | OUTPATIENT
Start: 2023-02-25 | End: 2023-02-26 | Stop reason: HOSPADM

## 2023-02-25 RX ORDER — DIPHENHYDRAMINE HYDROCHLORIDE 50 MG/ML
25 INJECTION INTRAMUSCULAR; INTRAVENOUS ONCE
Status: COMPLETED | OUTPATIENT
Start: 2023-02-25 | End: 2023-02-25

## 2023-02-25 RX ORDER — ONDANSETRON 4 MG/1
8 TABLET, ORALLY DISINTEGRATING ORAL EVERY 8 HOURS PRN
Status: DISCONTINUED | OUTPATIENT
Start: 2023-02-25 | End: 2023-02-26 | Stop reason: HOSPADM

## 2023-02-25 RX ORDER — HYDROCODONE BITARTRATE AND ACETAMINOPHEN 5; 325 MG/1; MG/1
1 TABLET ORAL EVERY 6 HOURS PRN
Status: DISCONTINUED | OUTPATIENT
Start: 2023-02-25 | End: 2023-02-26 | Stop reason: HOSPADM

## 2023-02-25 RX ORDER — SERTRALINE HYDROCHLORIDE 50 MG/1
50 TABLET, FILM COATED ORAL DAILY
Status: DISCONTINUED | OUTPATIENT
Start: 2023-02-26 | End: 2023-02-26 | Stop reason: HOSPADM

## 2023-02-25 RX ORDER — FAMOTIDINE 20 MG/1
20 TABLET, FILM COATED ORAL DAILY
Status: DISCONTINUED | OUTPATIENT
Start: 2023-02-25 | End: 2023-02-26 | Stop reason: HOSPADM

## 2023-02-25 RX ORDER — FOLIC ACID 1 MG/1
1 TABLET ORAL DAILY
Status: DISCONTINUED | OUTPATIENT
Start: 2023-02-25 | End: 2023-02-26 | Stop reason: HOSPADM

## 2023-02-25 RX ORDER — SIMETHICONE 80 MG
1 TABLET,CHEWABLE ORAL 4 TIMES DAILY PRN
Status: DISCONTINUED | OUTPATIENT
Start: 2023-02-25 | End: 2023-02-26 | Stop reason: HOSPADM

## 2023-02-25 RX ORDER — ACETAMINOPHEN 325 MG/1
650 TABLET ORAL EVERY 4 HOURS PRN
Status: DISCONTINUED | OUTPATIENT
Start: 2023-02-25 | End: 2023-02-26 | Stop reason: HOSPADM

## 2023-02-25 RX ORDER — SODIUM CHLORIDE 0.9 % (FLUSH) 0.9 %
10 SYRINGE (ML) INJECTION
Status: DISCONTINUED | OUTPATIENT
Start: 2023-02-25 | End: 2023-02-26 | Stop reason: HOSPADM

## 2023-02-25 RX ORDER — SERTRALINE HYDROCHLORIDE 25 MG/1
25 TABLET, FILM COATED ORAL DAILY
Status: DISCONTINUED | OUTPATIENT
Start: 2023-02-25 | End: 2023-02-25

## 2023-02-25 RX ORDER — DEXTROSE 40 %
15 GEL (GRAM) ORAL
Status: DISCONTINUED | OUTPATIENT
Start: 2023-02-25 | End: 2023-02-26 | Stop reason: HOSPADM

## 2023-02-25 RX ORDER — NIFEDIPINE 30 MG/1
60 TABLET, EXTENDED RELEASE ORAL DAILY
Status: DISCONTINUED | OUTPATIENT
Start: 2023-02-25 | End: 2023-02-26 | Stop reason: HOSPADM

## 2023-02-25 RX ORDER — IPRATROPIUM BROMIDE AND ALBUTEROL SULFATE 2.5; .5 MG/3ML; MG/3ML
3 SOLUTION RESPIRATORY (INHALATION) EVERY 6 HOURS PRN
Status: DISCONTINUED | OUTPATIENT
Start: 2023-02-25 | End: 2023-02-26

## 2023-02-25 RX ORDER — IBUPROFEN 200 MG
24 TABLET ORAL
Status: DISCONTINUED | OUTPATIENT
Start: 2023-02-25 | End: 2023-02-26 | Stop reason: HOSPADM

## 2023-02-25 RX ORDER — ARIPIPRAZOLE 2 MG/1
2 TABLET ORAL NIGHTLY
Status: DISCONTINUED | OUTPATIENT
Start: 2023-02-25 | End: 2023-02-26 | Stop reason: HOSPADM

## 2023-02-25 RX ORDER — NALOXONE HCL 0.4 MG/ML
0.02 VIAL (ML) INJECTION
Status: DISCONTINUED | OUTPATIENT
Start: 2023-02-25 | End: 2023-02-26 | Stop reason: HOSPADM

## 2023-02-25 RX ORDER — IBUPROFEN 200 MG
16 TABLET ORAL
Status: DISCONTINUED | OUTPATIENT
Start: 2023-02-25 | End: 2023-02-26 | Stop reason: HOSPADM

## 2023-02-25 RX ORDER — SODIUM CHLORIDE 0.9 % (FLUSH) 0.9 %
10 SYRINGE (ML) INJECTION
Status: DISCONTINUED | OUTPATIENT
Start: 2023-02-25 | End: 2023-02-25

## 2023-02-25 RX ORDER — TALC
6 POWDER (GRAM) TOPICAL NIGHTLY PRN
Status: DISCONTINUED | OUTPATIENT
Start: 2023-02-25 | End: 2023-02-25

## 2023-02-25 RX ORDER — GLUCAGON 1 MG
1 KIT INJECTION
Status: DISCONTINUED | OUTPATIENT
Start: 2023-02-25 | End: 2023-02-26 | Stop reason: HOSPADM

## 2023-02-25 RX ORDER — FOLIC ACID 1 MG/1
1 TABLET ORAL DAILY
Status: DISCONTINUED | OUTPATIENT
Start: 2023-02-25 | End: 2023-02-25

## 2023-02-25 RX ADMIN — DIPHENHYDRAMINE HYDROCHLORIDE 25 MG: 50 INJECTION, SOLUTION INTRAMUSCULAR; INTRAVENOUS at 10:02

## 2023-02-25 RX ADMIN — LABETALOL HYDROCHLORIDE 200 MG: 200 TABLET, FILM COATED ORAL at 08:02

## 2023-02-25 RX ADMIN — ENOXAPARIN SODIUM 40 MG: 40 INJECTION SUBCUTANEOUS at 05:02

## 2023-02-25 RX ADMIN — LABETALOL HYDROCHLORIDE 200 MG: 200 TABLET, FILM COATED ORAL at 09:02

## 2023-02-25 RX ADMIN — ARIPIPRAZOLE 2 MG: 2 TABLET ORAL at 08:02

## 2023-02-25 RX ADMIN — ASPIRIN 81 MG: 81 TABLET, COATED ORAL at 09:02

## 2023-02-25 RX ADMIN — SODIUM CHLORIDE, POTASSIUM CHLORIDE, SODIUM LACTATE AND CALCIUM CHLORIDE: 600; 310; 30; 20 INJECTION, SOLUTION INTRAVENOUS at 07:02

## 2023-02-25 RX ADMIN — ATORVASTATIN CALCIUM 40 MG: 40 TABLET, FILM COATED ORAL at 08:02

## 2023-02-25 RX ADMIN — LISINOPRIL 20 MG: 10 TABLET ORAL at 09:02

## 2023-02-25 RX ADMIN — NIFEDIPINE 60 MG: 30 TABLET, FILM COATED, EXTENDED RELEASE ORAL at 09:02

## 2023-02-25 RX ADMIN — RISPERIDONE 0.5 MG: 0.5 TABLET ORAL at 09:02

## 2023-02-25 RX ADMIN — LABETALOL HYDROCHLORIDE 10 MG: 5 INJECTION, SOLUTION INTRAVENOUS at 05:02

## 2023-02-25 RX ADMIN — SERTRALINE 25 MG: 25 TABLET, FILM COATED ORAL at 09:02

## 2023-02-25 RX ADMIN — FOLIC ACID 1 MG: 1 TABLET ORAL at 09:02

## 2023-02-25 RX ADMIN — LEVOTHYROXINE SODIUM 150 MCG: 0.1 TABLET ORAL at 07:02

## 2023-02-25 RX ADMIN — FAMOTIDINE 20 MG: 20 TABLET, FILM COATED ORAL at 09:02

## 2023-02-25 NOTE — PLAN OF CARE
Problem: Occupational Therapy  Goal: Occupational Therapy Goal  Description: Goals to be met by: 3/11/23     Patient will increase functional independence with ADLs by performing:    LE Dressing with Minimal Assistance.  Grooming while standing at sink with Stand-by Assistance.  Toileting from toilet with Minimal Assistance for hygiene and clothing management.   Bathing from  shower chair/bench with Minimal Assistance.  Toilet transfer to toilet with Contact Guard Assistance.  Increased functional strength to 5/5 for ADL.    Outcome: Ongoing, Progressing

## 2023-02-25 NOTE — PT/OT/SLP EVAL
Occupational Therapy   Evaluation    Name: Alysia Washington  MRN: [id number]  Admitting Diagnosis: Acute kidney insufficiency  Recent Surgery: * No surgery found *      Recommendations:     Discharge Recommendations: psychiatric facility, nursing facility, basic, nursing facility, skilled  Discharge Equipment Recommendations:  other (see comments) (TBD pending progress with therapy)  Barriers to discharge:  Other (Comment) (pt discharged home with but with failure to thrive d/t required 24-hr assist and family unable to provide)    Assessment:     Alysia Washington is a 52 y.o. female with a medical diagnosis of Acute kidney insufficiency.  She presents with catatonic-like behavior and low arousal/lethargy, responding to some basic verbal cues but otherwise requiring at least hand over hand touching assist for all activities, nonverbal but able to indicate yes/no at times, cooperative with mobility with assist for tf supine to sitting EOB and sit to stand, and upon return to bed continuously attempting to roll to L side. Performance deficits affecting function: weakness, impaired self care skills, impaired functional mobility, impaired cognition, decreased coordination, decreased safety awareness, abnormal tone, impaired coordination, other (comment) (psych deficits).      Rehab Prognosis: Fair; patient would benefit from acute skilled OT services to address these deficits and reach maximum level of function.       Plan:     Patient to be seen 5 x/week to address the above listed problems via self-care/home management, therapeutic activities, therapeutic exercises, neuromuscular re-education  Plan of Care Expires:    Plan of Care Reviewed with: patient    Subjective     Chief Complaint: pt nonverbal, indicated no c/o pain when asked  Patient/Family Comments/goals: none stated; per nursing staff pt was brought in after discharge home with her spouse who reported he is unable to provide 24-hr care for pt and would find her  soiled if left alone    Occupational Profile:  Living Environment: home with /significant other; pt did not provide additional history  Previous level of function: not provided by pt  Roles and Routines: unknown  Equipment Used at Home: other (see comments) (pt nonverbal; DME at home unknown at this time)  Assistance upon Discharge: pt will require 24-hr care    Pain/Comfort:  Pain Rating 1: 0/10    Patients cultural, spiritual, Episcopalian conflicts given the current situation: no    Objective:     Communicated with: nurse and Dr Reyes prior to session.  Patient found left sidelying with restraints, peripheral IV, bed alarm upon OT entry to room.    General Precautions: Standard, fall  Orthopedic Precautions: N/A  Braces: N/A  Respiratory Status: Room air    Occupational Performance:    Bed Mobility:    Patient completed Rolling/Turning to Left with  modified independence  Patient completed Rolling/Turning to Right with moderate assistance  Patient completed Scooting/Bridging with total assistance  Patient completed Supine to Sit with moderate assistance  Patient completed Sit to Supine with moderate assistance    Functional Mobility/Transfers:  Patient completed Sit <> Stand Transfer with minimum assistance and of 2 persons  with  hand-held assist   Functional Mobility: Pt did not follow verbal or tactile cues to sidestep to HOB, with LLE going into extensor pattern when OT attempted to assist with weight shift onto RLE; pt was assisted back to sitting EOB and ambulation not attempted.  Pt unsafe to sit up OOB at this time.    Activities of Daily Living:  Feeding:  nurse reported setting pt up with breakfast tray this am when pt indicated yes when offered breakfast; pt did not demonstrate feeding herself at eval but did progress from drinking water with straw with min A initially to SBA to use R hand to bring cup to mouth in L sidelying with HOB elevated .  Grooming: moderate assistance to wipe face with  washcloth seated EOB; pt followed verbal cues to wipe face when washcloth placed in R hand, but did not complete task without mod A to finish  Lower Body Dressing: total assistance to don socks seated EOB  Toileting: total assistance to change urine-soiled brief in supine     Cognitive/Visual Perceptual:  Cognitive/Psychosocial Skills:     -       Oriented to: pt nonverbal, unable to assess   -       Follows Commands/attention:Inattentive, Follows one-step commands, and required max cuing or hand over hand tactile cues at times, inconsistent with following commands  -       Safety awareness/insight to disability: impaired   -       Mood/Affect/Coping skills/emotional control: Despondent, Cooperative, Flat affect, Lethargic, and Withdrawn  Visual/Perceptual:      -unable to assess .    Physical Exam:  OUMAR BRAVO WFL, following commands and hand over hand to demonstrate seated EOB.  Pt demonstrated preference of R hand for drinking and grooming but demonstrated squeezing with L hand with verbal/tactile cues to do so.  Strength unable to assess at this time d/t cognitive/psych/arousal deficits.    AMPA 6 Click ADL:  AMPAC Total Score:      Treatment & Education:  Pt educated on bed positioning, OT goals and POC, use of call bell for assist with all transfers OOB d/t high fall risk.  Pt did not verbalize understanding, will need reinforcement and is a high fall risk.    Patient left  in L sidelying with HOB elevated  with all lines intact, call button in reach, bed alarm on, and restraints reapplied at end of session    GOALS:   Multidisciplinary Problems       Occupational Therapy Goals          Problem: Occupational Therapy    Goal Priority Disciplines Outcome Interventions   Occupational Therapy Goal     OT, PT/OT Ongoing, Progressing    Description: Goals to be met by: 3/11/23     Patient will increase functional independence with ADLs by performing:    LE Dressing with Minimal Assistance.  Grooming while standing  at sink with Stand-by Assistance.  Toileting from toilet with Minimal Assistance for hygiene and clothing management.   Bathing from  shower chair/bench with Minimal Assistance.  Toilet transfer to toilet with Contact Guard Assistance.  Increased functional strength to 5/5 for ADL.                         History:     No past medical history on file.    No past surgical history on file.    Time Tracking:     OT Date of Treatment: 02/25/23  OT Start Time: 1124  OT Stop Time: 1139  OT Total Time (min): 15 min    Billable Minutes:Evaluation 15    2/25/2023

## 2023-02-25 NOTE — ASSESSMENT & PLAN NOTE
-patient with profound hypothyroidism on last admission with a TSH in the 150s   -she was started on levothyroxine 150 mcg and TSH has responded appropriately   -I have reviewed her home medications continue with current management no changes at this time

## 2023-02-25 NOTE — ASSESSMENT & PLAN NOTE
-CT head found pronounced microvascular change  -MRI on prior admission found small parietal lobe acute to subacute nonhemorrhagic infarct  -aspirin, atorvastatin, BP control  -ascvd risk of 72.3%

## 2023-02-25 NOTE — ED NOTES
"Patient arrive with spouse with complaints of confusion and incontinent. She was admitted a few days ago and discharged home. Pt and family was told to follow up with doctor. Patient had an appointment with doctor and spouse didn't take her cause"he had to work". Spouse return to er with patient with above complaint.   "

## 2023-02-25 NOTE — PLAN OF CARE
Problem: Adult Inpatient Plan of Care  Goal: Plan of Care Review  Outcome: Ongoing, Progressing  Goal: Patient-Specific Goal (Individualized)  Outcome: Ongoing, Progressing  Goal: Absence of Hospital-Acquired Illness or Injury  Outcome: Ongoing, Progressing  Goal: Optimal Comfort and Wellbeing  Outcome: Ongoing, Progressing  Intervention: Monitor Pain and Promote Comfort  Flowsheets (Taken 2/25/2023 0640)  Pain Management Interventions:   pain management plan reviewed with patient/caregiver   quiet environment facilitated  Intervention: Provide Person-Centered Care  Flowsheets (Taken 2/25/2023 0640)  Trust Relationship/Rapport:   care explained   choices provided   emotional support provided   empathic listening provided   questions answered   questions encouraged   reassurance provided   thoughts/feelings acknowledged  Goal: Readiness for Transition of Care  Outcome: Ongoing, Progressing     Problem: Skin Injury Risk Increased  Goal: Skin Health and Integrity  Outcome: Ongoing, Progressing     Problem: Infection  Goal: Absence of Infection Signs and Symptoms  Outcome: Ongoing, Progressing  Intervention: Prevent or Manage Infection  Flowsheets (Taken 2/25/2023 0822)  Fever Reduction/Comfort Measures:   lightweight clothing   lightweight bedding  Infection Management: aseptic technique maintained     Problem: Infection  Goal: Absence of Infection Signs and Symptoms  Outcome: Ongoing, Progressing  Intervention: Prevent or Manage Infection  Flowsheets (Taken 2/25/2023 0822)  Fever Reduction/Comfort Measures:   lightweight clothing   lightweight bedding  Infection Management: aseptic technique maintained

## 2023-02-25 NOTE — PSYCH EVALUATION
2/25/2023 4:56 PM   Alysia Washington   1970   [id number]       Initial Psychiatric Consult    Chief complaint: Ms. Washington does not provide any information.     SUBJECTIVE:   HPI:   Alysia Washington is a 52 y.o. female with past psychiatric history of nothing per her boyfriend Corby Vázquez, currently admitted with Acute kidney insufficiency.  Psychiatry has been consulted to address patient's lack of engagement.      Collateral:   Nurse Phyllis reports that Alysia has been mute.  She says that since admission, she has not really done anything.  She says that she has managed to make an egg sandwich for breakfast but only took one bite of it.    I spoke with Ms. Hatfield's boyfriend, Corby Vázquez.  He reports that he and Ms. Hatfield have been together for the past 12 years.  He says that their relationship started out of adultery by Ms. Hatfield.  He says that when her  found out about their affair, he put her out of the house and she came live with him because she has not had anywhere else to go.  He reports that Ms. Hatfield has not had any contact with either of her boys since her  put her out.  He further reports that her family does not get much involved with her due to him feeling that after he told them he needs help with her, they stopped accepting phone calls and blocked his number.    Mr. Vázquez reports that Ms. Hatfield has been crying on a daily basis for the past three months.  He says that she does nothing in the house, but eats.  He says that she does not cook nor does she perform any of her ADLs.  He says that he cooks and fixes her plate and she eats and feeds herself but that is it.  He further reports that every day he comes home from work, she is fll of urine and feces because she does not even take herself to the bathroom.  He says that she does not walk.  He says that she does not even talk to him so that he can try to figure out what is going on.  Mr. Vázquez says that Ms. Hatfield really stopped  taking care of herself though for about the last 8 months.  He says that she was not like this.  He says that he cannot do much more with her because he has to work and he cannot pay anyone to take care of her.  He saysthat she has to get better otherwise he cannot help her anymore.  He admits that he gave her a 1/2 tablet of Norco because she kept reporting being itching.  He said that he thought that would help her due to him thinking that she probably had been sore sitting in urine and feces all day.    Past Psychiatric History:   Previous Psychiatric Hospitalizations: None per boyfriend Corby   Previous Medication Trials: Zoloft, Risperdal  Previous Suicide Attempts: Unknown   History of Violence: None per boyfriend   Outpatient psychiatrist: None     Past Medical/Surgical History:   No past medical history on file.  No past surgical history on file.       Family Psychiatric History:   Unknown       Current Medications:   Scheduled Meds:    aspirin  81 mg Oral Daily    atorvastatin  40 mg Oral QHS    enoxaparin  40 mg Subcutaneous Daily    famotidine  20 mg Oral Daily    folic acid  1 mg Oral Daily    labetaloL  200 mg Oral Q12H    levothyroxine  150 mcg Oral Before breakfast    lisinopriL  20 mg Oral Daily    NIFEdipine  60 mg Oral Daily    risperiDONE  0.5 mg Oral BID    sertraline  25 mg Oral Daily      PRN Meds: acetaminophen, albuterol-ipratropium, aluminum-magnesium hydroxide-simethicone, bisacodyL, dextrose 10%, dextrose, glucagon (human recombinant), glucose, glucose, HYDROcodone-acetaminophen, labetalol, melatonin, morphine, naloxone, ondansetron, ondansetron, polyethylene glycol, simethicone, sodium chloride 0.9%   Psychotherapeutics (From admission, onward)      Start     Stop Route Frequency Ordered    02/25/23 0900  sertraline tablet 25 mg         -- Oral Daily 02/25/23 0649    02/25/23 0900  risperiDONE tablet 0.5 mg         -- Oral 2 times daily 02/25/23 0649          OTC Meds:    Home Meds:       Allergies:   Review of patient's allergies indicates:  No Known Allergies       Substance Abuse History:   Tobacco Use:Unknown   Use of Alcohol: Unknown   Recreational Drugs: Her boyfriend Corby reports that about 5 years or more ago, she was addicted to prescription Xanax but her health care provider at the time stopped giving her the prescriptions then.   Rehab History:Unknown       Nerurological History:   Seizures: Boyfriend Corby said that she had seizures after withdrawal from Xanax.   Head trauma: Unknown     Social History:  Housing Status: Lives with boyfriend Corby Vázquez  Relationship Status/Sexual Orientation: , has a boyfriend   Children: 2 sons  Education: Unknown   Employment Status/Info: Unknown    history: Unknown  History of physical/sexual abuse: Unknown   Access to gun: Unknown       Legal History:   Past Charges/Incarcerations:Unknown   Pending charges: Unknown       OBJECTIVE:   Vitals   Vitals:    02/25/23 1556   BP: (!) 165/91   Pulse: 82   Resp:    Temp:         Labs/Imaging/Studies:   Recent Results (from the past 48 hour(s))   Comprehensive metabolic panel    Collection Time: 02/25/23  4:02 AM   Result Value Ref Range    Sodium Level 142 136 - 145 mmol/L    Potassium Level 3.5 3.5 - 5.1 mmol/L    Chloride 103 98 - 107 mmol/L    Carbon Dioxide 27 22 - 29 mmol/L    Glucose Level 169 (H) 74 - 100 mg/dL    Blood Urea Nitrogen 24.8 (H) 9.8 - 20.1 mg/dL    Creatinine 1.42 (H) 0.55 - 1.02 mg/dL    Calcium Level Total 9.8 8.4 - 10.2 mg/dL    Protein Total 8.1 6.4 - 8.3 gm/dL    Albumin Level 3.3 (L) 3.5 - 5.0 g/dL    Globulin 4.8 (H) 2.4 - 3.5 gm/dL    Albumin/Globulin Ratio 0.7 (L) 1.1 - 2.0 ratio    Bilirubin Total 0.3 <=1.5 mg/dL    Alkaline Phosphatase 133 40 - 150 unit/L    Alanine Aminotransferase 16 0 - 55 unit/L    Aspartate Aminotransferase 18 5 - 34 unit/L    eGFR 44 mls/min/1.73/m2   TSH    Collection Time: 02/25/23  4:02 AM   Result Value Ref Range    Thyroid  Stimulating Hormone 3.224 0.350 - 4.940 uIU/mL   T4, Free    Collection Time: 02/25/23  4:02 AM   Result Value Ref Range    Thyroxine Free 1.23 0.70 - 1.48 ng/dL   CBC with Differential    Collection Time: 02/25/23  4:02 AM   Result Value Ref Range    WBC 12.8 (H) 4.5 - 11.5 x10(3)/mcL    RBC 3.97 (L) 4.20 - 5.40 x10(6)/mcL    Hgb 10.4 (L) 12.0 - 16.0 g/dL    Hct 33.2 (L) 37.0 - 47.0 %    MCV 83.6 80.0 - 94.0 fL    MCH 26.2 pg    MCHC 31.3 (L) 33.0 - 36.0 g/dL    RDW 16.4 11.5 - 17.0 %    Platelet 487 (H) 130 - 400 x10(3)/mcL    MPV 9.6 7.4 - 10.4 fL    Neut % 73.2 %    Lymph % 14.3 %    Mono % 7.3 %    Eos % 4.2 %    Basophil % 0.2 %    Lymph # 1.83 0.6 - 4.6 x10(3)/mcL    Neut # 9.38 (H) 2.1 - 9.2 x10(3)/mcL    Mono # 0.94 0.1 - 1.3 x10(3)/mcL    Eos # 0.54 0 - 0.9 x10(3)/mcL    Baso # 0.03 0 - 0.2 x10(3)/mcL    IG# 0.10 (H) 0 - 0.04 x10(3)/mcL    IG% 0.8 %   Drug Screen, Urine    Collection Time: 02/25/23  4:03 AM   Result Value Ref Range    Amphetamines, Urine Negative Negative    Barbituates, Urine Negative Negative    Benzodiazepine, Urine Negative Negative    Cannabinoids, Urine Negative Negative    Cocaine, Urine Negative Negative    Opiates, Urine Positive (A) Negative    Phencyclidine, Urine Negative Negative    pH, Urine 5.5 3.0 - 11.0    Specific Gravity, Urine Auto >=1.030 1.001 - 1.035   Urinalysis, Reflex to Urine Culture    Collection Time: 02/25/23  4:03 AM    Specimen: Urine   Result Value Ref Range    Color, UA Yellow Yellow, Light-Yellow, Dark Yellow, Anna Marie, Straw    Appearance, UA Clear Clear    Specific Gravity, UA >=1.030     pH, UA 5.5 5.0 - 8.5    Protein, UA Negative Negative mg/dL    Glucose, UA Negative Negative, Normal mg/dL    Ketones, UA Negative Negative mg/dL    Blood, UA Trace-Lysed (A) Negative unit/L    Bilirubin, UA Negative Negative mg/dL    Urobilinogen, UA 0.2 0.2, 1.0, Normal mg/dL    Nitrites, UA Negative Negative    Leukocyte Esterase, UA Negative Negative unit/L    Urinalysis, Microscopic    Collection Time: 02/25/23  4:03 AM   Result Value Ref Range    Bacteria, UA None Seen None Seen, Rare, Occasional /HPF    RBC, UA None Seen None Seen, 0-2, 3-5, 0-5 /HPF    WBC, UA 0-2 None Seen, 0-2, 3-5, 0-5 /HPF    Squamous Epithelial Cells, UA None Seen None Seen, Rare, Occasional, Occ /HPF      No results found for: PHENYTOIN, PHENOBARB, VALPROATE, CBMZ      Medical Review Of Systems:  Unable to assess due to no cooperation by Ms. Washington    Psychiatric Mental Status Exam:  General Appearance: lying in bed, disheveled  Arousal: alert  Behavior: minimal responses, poor eye contact, cries  Movements and Motor Activity: no abnormal involuntary movements noted  Orientation: oriented to person only, not able to recall date, month, year, or current [place] President, poor effort given during testing  Speech:  Only two responses, every other response was with crying.  Mood: Depressed  Affect: mood-incongruent, dysphoric, tearful  Thought Process: Unable to Assess  Associations: Unable to Assess  Thought Content and Perceptions: Unable to Assess  Recent and Remote Memory: Unable to Assess  Attention and Concentration: Unable to Assess  Fund of Knowledge: Unable to Assess  Insight: poor  Judgment: poor    ASSESSMENT/PLAN:   Depressive Disorder, NOS  Recent lacunar infarct    No past medical history on file.       Recommendations:   Recommend discontinuing risperidone  Increase Zoloft 50 mg PO daily  Recommend adding Abilify 2 mg PO Q HS  Recommend neuro consult to rule out neuro causes of symptoms considering recent reports suggesting acute/subacute lacunar infarct.  If neuro work up reveals no neuro causes, recommend a PEC due to her being gravely disabled.  Psych to continue following.      Gustabo Andres

## 2023-02-25 NOTE — HPI
52-year-old female with a past medical history of hypothyroidism, chronic microvascular cerebral ischemia, nonhemorrhagic infarcts, major depressive disorder.  Patient is not very vocal however she does respond appropriately. 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  Reportedly she was brought in by her boyfriend secondary to altered mental status at home.  Her boyfriend reports that she is at home alone the majority of the day while he is at work and he would return home and find her soiled and sitting in her feces. Workup in the ED with no acute findings CT head once again saw multiple lacunar infarcts of the right basal ganglia however these were present on CT head done 01/29/2023.  Patient remains with poor cognition/insight and only responsive with yes or no and one-word answers.

## 2023-02-25 NOTE — SUBJECTIVE & OBJECTIVE
No past medical history on file.    No past surgical history on file.    Review of patient's allergies indicates:  No Known Allergies    No current facility-administered medications on file prior to encounter.     Current Outpatient Medications on File Prior to Encounter   Medication Sig    atorvastatin (LIPITOR) 40 MG tablet Take 1 tablet (40 mg total) by mouth every evening.    folic acid (FOLVITE) 1 MG tablet Take 1 tablet (1 mg total) by mouth once daily.    levothyroxine (SYNTHROID) 150 MCG tablet Take 1 tablet (150 mcg total) by mouth before breakfast.    lisinopriL (PRINIVIL,ZESTRIL) 20 MG tablet Take 1 tablet (20 mg total) by mouth once daily.    risperiDONE (RISPERDAL) 0.5 MG Tab Take 1 tablet (0.5 mg total) by mouth 2 (two) times daily.    sertraline (ZOLOFT) 25 MG tablet Take 1 tablet (25 mg total) by mouth once daily.     Family History    None       Tobacco Use    Smoking status: Not on file    Smokeless tobacco: Not on file   Substance and Sexual Activity    Alcohol use: Not on file    Drug use: Not on file    Sexual activity: Not on file     Review of Systems   Skin:  Positive for rash (pruritis).   Objective:     Vital Signs (Most Recent):  Temp: 98 °F (36.7 °C) (02/25/23 0721)  Pulse: 86 (02/25/23 0937)  Resp: 18 (02/25/23 0937)  BP: (!) 154/97 (02/25/23 0923)  SpO2: 96 % (02/25/23 0937)   Vital Signs (24h Range):  Temp:  [98 °F (36.7 °C)-98.2 °F (36.8 °C)] 98 °F (36.7 °C)  Pulse:  [83-95] 86  Resp:  [18-20] 18  SpO2:  [94 %-99 %] 96 %  BP: (154-196)/() 154/97     Weight: 80 kg (176 lb 5.9 oz)  Body mass index is 28.47 kg/m².    Physical Exam  Constitutional:       General: She is not in acute distress.     Appearance: She is obese.   HENT:      Head: Normocephalic and atraumatic.      Nose: No congestion.   Cardiovascular:      Rate and Rhythm: Normal rate and regular rhythm.      Heart sounds: No murmur heard.  Pulmonary:      Effort: Pulmonary effort is normal.      Breath sounds: Normal  breath sounds.   Abdominal:      General: Bowel sounds are normal. There is no distension.      Palpations: Abdomen is soft. There is no mass.      Tenderness: There is no abdominal tenderness.   Musculoskeletal:         General: Normal range of motion.   Skin:     General: Skin is warm.      Findings: Rash (L thigh) present. No lesion.   Neurological:      General: No focal deficit present.      Cranial Nerves: No cranial nerve deficit.   Psychiatric:         Mood and Affect: Mood normal. Affect is flat.         Speech: She is noncommunicative.         Behavior: Behavior is slowed and withdrawn.           Significant Labs: All pertinent labs within the past 24 hours have been reviewed.    Significant Imaging: I have reviewed all pertinent imaging results/findings within the past 24 hours.

## 2023-02-25 NOTE — H&P
[facility]  [facility]  History & Physical    Patient Name: Alysia Washington  MRN: [id number]  Patient Class: OP- Observation  Admission Date: 2/25/2023  Attending Physician: Thairy G Reyes, DO   Primary Care Provider: LIANNA aPul         Patient information was obtained from patient and ER records.     Subjective:     Principal Problem:Acute kidney insufficiency    Chief Complaint:   Chief Complaint   Patient presents with    Altered Mental Status     Patient in per wc with boyfriend he states she is urinating on herself and is the same as last ED visit did not followup with PCP awake slow to answer starts crying         HPI: 52-year-old female with a past medical history of hypothyroidism, chronic microvascular cerebral ischemia, nonhemorrhagic infarcts, major depressive disorder.  Patient is not very vocal however she does respond appropriately. 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  Reportedly she was brought in by her boyfriend secondary to altered mental status at home.  Her boyfriend reports that she is at home alone the majority of the day while he is at work and he would return home and find her soiled and sitting in her feces. Workup in the ED with no acute findings CT head once again saw multiple lacunar infarcts of the right basal ganglia however these were present on CT head done 01/29/2023.  Patient remains with poor cognition/insight and only responsive with yes or no and one-word answers.      No past medical history on file.    No past surgical history on file.    Review of patient's allergies indicates:  No Known Allergies    No current facility-administered medications on file prior to encounter.     Current Outpatient Medications on File Prior to Encounter   Medication Sig    atorvastatin (LIPITOR) 40 MG tablet Take 1 tablet (40 mg total) by mouth every evening.    folic acid (FOLVITE)  1 MG tablet Take 1 tablet (1 mg total) by mouth once daily.    levothyroxine (SYNTHROID) 150 MCG tablet Take 1 tablet (150 mcg total) by mouth before breakfast.    lisinopriL (PRINIVIL,ZESTRIL) 20 MG tablet Take 1 tablet (20 mg total) by mouth once daily.    risperiDONE (RISPERDAL) 0.5 MG Tab Take 1 tablet (0.5 mg total) by mouth 2 (two) times daily.    sertraline (ZOLOFT) 25 MG tablet Take 1 tablet (25 mg total) by mouth once daily.     Family History    None       Tobacco Use    Smoking status: Not on file    Smokeless tobacco: Not on file   Substance and Sexual Activity    Alcohol use: Not on file    Drug use: Not on file    Sexual activity: Not on file     Review of Systems   Skin:  Positive for rash (pruritis).   Objective:     Vital Signs (Most Recent):  Temp: 98 °F (36.7 °C) (02/25/23 0721)  Pulse: 86 (02/25/23 0937)  Resp: 18 (02/25/23 0937)  BP: (!) 154/97 (02/25/23 0923)  SpO2: 96 % (02/25/23 0937)   Vital Signs (24h Range):  Temp:  [98 °F (36.7 °C)-98.2 °F (36.8 °C)] 98 °F (36.7 °C)  Pulse:  [83-95] 86  Resp:  [18-20] 18  SpO2:  [94 %-99 %] 96 %  BP: (154-196)/() 154/97     Weight: 80 kg (176 lb 5.9 oz)  Body mass index is 28.47 kg/m².    Physical Exam  Constitutional:       General: She is not in acute distress.     Appearance: She is obese.   HENT:      Head: Normocephalic and atraumatic.      Nose: No congestion.   Cardiovascular:      Rate and Rhythm: Normal rate and regular rhythm.      Heart sounds: No murmur heard.  Pulmonary:      Effort: Pulmonary effort is normal.      Breath sounds: Normal breath sounds.   Abdominal:      General: Bowel sounds are normal. There is no distension.      Palpations: Abdomen is soft. There is no mass.      Tenderness: There is no abdominal tenderness.   Musculoskeletal:         General: Normal range of motion.   Skin:     General: Skin is warm.      Findings: Rash (L thigh) present. No lesion.   Neurological:      General: No focal deficit present.       Cranial Nerves: No cranial nerve deficit.   Psychiatric:         Mood and Affect: Mood normal. Affect is flat.         Speech: She is noncommunicative.         Behavior: Behavior is slowed and withdrawn.           Significant Labs: All pertinent labs within the past 24 hours have been reviewed.    Significant Imaging: I have reviewed all pertinent imaging results/findings within the past 24 hours.    Assessment/Plan:     * Acute kidney insufficiency  -IVF, repeat BMP in AM      AMS (altered mental status)  -2/2 psych condition?  -unclear if patient has been taking her medications at home   -no acute toxic/metabolic source for her altered mentation and CT head with no acute findings      Hypothyroidism  -patient with profound hypothyroidism on last admission with a TSH in the 150s   -she was started on levothyroxine 150 mcg and TSH has responded appropriately   -I have reviewed her home medications continue with current management no changes at this time      Essential hypertension  -continue with nifedipine, lisinopril, labetalol      MDD (major depressive disorder)  -re-consult tele psychiatry   -continue with sertraline and risperidone in the meantime      Stroke  -CT head found pronounced microvascular change  -MRI on prior admission found small parietal lobe acute to subacute nonhemorrhagic infarct  -aspirin, atorvastatin, BP control  -ascvd risk of 72.3%    Illicit drug use  -opiate positive in the ED on UDS      Admit to observation status under my care  VTE Risk Mitigation (From admission, onward)         Ordered     enoxaparin injection 40 mg  Daily         02/25/23 0649     Place sequential compression device  Until discontinued         02/25/23 0649     IP VTE LOW RISK PATIENT  Once         02/25/23 0628     Place TRINITY hose  Until discontinued         02/25/23 0628                   Thairy G Reyes, DO  Department of Hospital Medicine   [facility]

## 2023-02-25 NOTE — PLAN OF CARE
Problem: Adult Inpatient Plan of Care  Goal: Plan of Care Review  Outcome: Ongoing, Progressing  Flowsheets (Taken 2/25/2023 1044)  Plan of Care Reviewed With: patient  Goal: Patient-Specific Goal (Individualized)  Outcome: Ongoing, Progressing  Flowsheets (Taken 2/25/2023 1044)  Anxieties, Fears or Concerns: Patient very itchy  Individualized Care Needs: asistance with ADLs  Goal: Absence of Hospital-Acquired Illness or Injury  Outcome: Ongoing, Progressing  Intervention: Identify and Manage Fall Risk  Flowsheets (Taken 2/25/2023 1044)  Safety Promotion/Fall Prevention:   assistive device/personal item within reach   bed alarm set   Fall Risk reviewed with patient/family   Fall Risk signage in place   medications reviewed   lighting adjusted   nonskid shoes/socks when out of bed   instructed to call staff for mobility   side rails raised x 3   Roll belt self-releasing  Intervention: Prevent Skin Injury  Flowsheets (Taken 2/25/2023 1044)  Skin Protection:   adhesive use limited   incontinence pads utilized   transparent dressing maintained   tubing/devices free from skin contact  Intervention: Prevent and Manage VTE (Venous Thromboembolism) Risk  Flowsheets (Taken 2/25/2023 1044)  Activity Management: Rolling - L1  VTE Prevention/Management:   fluids promoted   bleeding precations maintained   intravenous hydration  Range of Motion: active ROM (range of motion) encouraged  Intervention: Prevent Infection  Flowsheets (Taken 2/25/2023 1044)  Infection Prevention:   cohorting utilized   rest/sleep promoted   single patient room provided   hand hygiene promoted   environmental surveillance performed   equipment surfaces disinfected  Goal: Optimal Comfort and Wellbeing  Outcome: Ongoing, Progressing  Intervention: Monitor Pain and Promote Comfort  Flowsheets (Taken 2/25/2023 1044)  Pain Management Interventions:   care clustered   pillow support provided  Intervention: Provide Person-Centered Care  Flowsheets (Taken  2/25/2023 1044)  Trust Relationship/Rapport:   care explained   questions encouraged  Goal: Readiness for Transition of Care  Outcome: Ongoing, Progressing     Problem: Skin Injury Risk Increased  Goal: Skin Health and Integrity  Outcome: Ongoing, Progressing  Intervention: Optimize Skin Protection  Flowsheets (Taken 2/25/2023 1044)  Pressure Reduction Techniques: frequent weight shift encouraged  Pressure Reduction Devices: positioning supports utilized  Skin Protection:   adhesive use limited   incontinence pads utilized   transparent dressing maintained   tubing/devices free from skin contact  Head of Bed (HOB) Positioning:   HOB at 30 degrees   HOB at 20-30 degrees   HOB at 30-45 degrees  Intervention: Promote and Optimize Oral Intake  Flowsheets (Taken 2/25/2023 1044)  Oral Nutrition Promotion:   calorie-dense foods provided   calorie-dense liquids provided   rest periods promoted   physical activity promoted     Problem: Infection  Goal: Absence of Infection Signs and Symptoms  Outcome: Ongoing, Progressing  Intervention: Prevent or Manage Infection  Flowsheets (Taken 2/25/2023 1044)  Infection Management: aseptic technique maintained

## 2023-02-25 NOTE — ED PROVIDER NOTES
Encounter Date: 2/25/2023       History     Chief Complaint   Patient presents with    Altered Mental Status     Patient in per wc with boyfriend he states she is urinating on herself and is the same as last ED visit did not followup with PCP awake slow to answer starts crying      Pt is a 53yo f with recent stroke that presents to ed with ams. Pt boyfriend brings her in stating that she is altered, she is peeing, pooping on herself. He states this how she was her last visit when she had a stroke.     The history is provided by the spouse. No  was used.   Review of patient's allergies indicates:  No Known Allergies  No past medical history on file.  No past surgical history on file.  No family history on file.     Review of Systems   Unable to perform ROS: Mental status change     Physical Exam     Initial Vitals [02/25/23 0317]   BP Pulse Resp Temp SpO2   (!) 196/117 95 18 -- 96 %      MAP       --         Physical Exam    Constitutional: She appears well-developed and well-nourished.   HENT:   Head: Normocephalic.   Eyes: EOM are normal. Pupils are equal, round, and reactive to light.   Neck:   Normal range of motion.  Cardiovascular:  Normal rate, regular rhythm, normal heart sounds and intact distal pulses.           Pulmonary/Chest: Breath sounds normal.   Abdominal: Abdomen is soft. Bowel sounds are normal.   Musculoskeletal:      Cervical back: Normal range of motion.     Neurological: She is alert.   Follows commands        ED Course   Procedures  Labs Reviewed   COMPREHENSIVE METABOLIC PANEL - Abnormal; Notable for the following components:       Result Value    Glucose Level 169 (*)     Blood Urea Nitrogen 24.8 (*)     Creatinine 1.42 (*)     Albumin Level 3.3 (*)     Globulin 4.8 (*)     Albumin/Globulin Ratio 0.7 (*)     All other components within normal limits   DRUG SCREEN, URINE (BEAKER) - Abnormal; Notable for the following components:    Opiates, Urine Positive (*)     All other  components within normal limits    Narrative:     Cut off concentrations:    Amphetamines - 1000 ng/ml  Barbiturates - 200 ng/ml  Benzodiazepine - 200 ng/ml  Cannabinoids (THC) - 50 ng/ml  Cocaine - 300 ng/ml  Fentanyl - 1.0 ng/ml  MDMA - 500 ng/ml  Opiates - 300 ng/ml   Phencyclidine (PCP) - 25 ng/ml    Specimen submitted for drug analysis and tested for pH and specific gravity in order to evaluate sample integrity. Suspect tampering if specific gravity is <1.003 and/or pH is not within the range of 4.5 - 8.0  False negatives may result form substances such as bleach added to urine.  False positives may result for the presence of a substance with similar chemical structure to the drug or its metabolite.    This test provides only a PRELIMINARY analytical test result. A more specific alternate chemical method must be used in order to obtain a confirmed analytical result. Gas chromatography/mass spectrometry (GC/MS) is the preferred confirmatory method. Other chemical confirmation methods are available. Clinical consideration and professional judgement should be applied to any drug of abuse test result, particularly when preliminary positive results are used.    Positive results will be confirmed only at the physicians request. Unconfirmed screening results are to be used only for medical purposes (treatment).        URINALYSIS, REFLEX TO URINE CULTURE - Abnormal; Notable for the following components:    Blood, UA Trace-Lysed (*)     All other components within normal limits   CBC WITH DIFFERENTIAL - Abnormal; Notable for the following components:    WBC 12.8 (*)     RBC 3.97 (*)     Hgb 10.4 (*)     Hct 33.2 (*)     MCHC 31.3 (*)     Platelet 487 (*)     Neut # 9.38 (*)     IG# 0.10 (*)     All other components within normal limits   TSH - Normal   T4, FREE - Normal   URINALYSIS, MICROSCOPIC - Normal   CBC W/ AUTO DIFFERENTIAL    Narrative:     The following orders were created for panel order CBC auto  differential.  Procedure                               Abnormality         Status                     ---------                               -----------         ------                     CBC with Differential[[id number]]        Abnormal            Final result                 Please view results for these tests on the individual orders.          Imaging Results              CT Head Without Contrast (Preliminary result)  Result time 02/25/23 05:26:19      Preliminary result by Ortiz Vazquez MD (02/25/23 05:26:19)                   Narrative:    START OF REPORT:  Technique: CT of the head was performed without intravenous contrast with axial as well as coronal and sagittal images.    Comparison: None.    Dosage Information: Automated Exposure Control was utilized 1452.71 mGy.cm.    Clinical history: Altered Mental Status (Patient in per wc with boyfriend he states she is urinating on herself and is the same as last ED visit did not followup with PCP awake slow to answer starts crying ).    Findings:  Hemorrhage: No acute intracranial hemorrhage is seen.  CSF spaces: The ventricles sulci and basal cisterns are within normal limits for age.  Brain parenchyma: Pronounced microvascular change is seen in portions of the periventricular and deep white matter tracts.  Vascular territory infarcts:  Lacunar infarcts: There are multiple lacunar infarcts on the right including the right basal ganglia and deep periventricular white matter.  Cerebellum: Unremarkable.  Sella and skull base: The sella appears to be within normal limits for age.  Intracranial calcifications: Incidental note is made of bilateral choroid plexus calcification. Incidental note is made of some pineal region calcification.  Calvarium: No acute linear or depressed skull fracture is seen.    Maxillofacial Structures:  Paranasal sinuses: The visualized paranasal sinuses appear clear with no mucoperiosteal thickening or air fluid levels identified.  Orbits:  The orbits appear unremarkable.  Zygomatic arches: The zygomatic arches are intact and unremarkable.  Temporal bones and mastoids: The temporal bones and mastoids appear unremarkable.  TMJ: The mandibular condyles appear normally placed with respect to the mandibular fossa.      Impression:  1. Pronounced microvascular change is seen in portions of the periventricular and deep white matter tracts.  2. There are multiple lacunar infarcts on the right including the right basal ganglia and deep periventricular white matter.  3. No acute intracranial process identified. Details and other findings as noted above.                          Preliminary result by Interface, Rad Results In (02/25/23 05:26:19)                   Narrative:    START OF REPORT:  Technique: CT of the head was performed without intravenous contrast with axial as well as coronal and sagittal images.    Comparison: None.    Dosage Information: Automated Exposure Control was utilized 1452.71 mGy.cm.    Clinical history: Altered Mental Status (Patient in per wc with boyfriend he states she is urinating on herself and is the same as last ED visit did not followup with PCP awake slow to answer starts crying ).    Findings:  Hemorrhage: No acute intracranial hemorrhage is seen.  CSF spaces: The ventricles sulci and basal cisterns are within normal limits for age.  Brain parenchyma: Pronounced microvascular change is seen in portions of the periventricular and deep white matter tracts.  Vascular territory infarcts:  Lacunar infarcts: There are multiple lacunar infarcts on the right including the right basal ganglia and deep periventricular white matter.  Cerebellum: Unremarkable.  Sella and skull base: The sella appears to be within normal limits for age.  Intracranial calcifications: Incidental note is made of bilateral choroid plexus calcification. Incidental note is made of some pineal region calcification.  Calvarium: No acute linear or depressed skull  fracture is seen.    Maxillofacial Structures:  Paranasal sinuses: The visualized paranasal sinuses appear clear with no mucoperiosteal thickening or air fluid levels identified.  Orbits: The orbits appear unremarkable.  Zygomatic arches: The zygomatic arches are intact and unremarkable.  Temporal bones and mastoids: The temporal bones and mastoids appear unremarkable.  TMJ: The mandibular condyles appear normally placed with respect to the mandibular fossa.      Impression:  1. Pronounced microvascular change is seen in portions of the periventricular and deep white matter tracts.  2. There are multiple lacunar infarcts on the right including the right basal ganglia and deep periventricular white matter.  3. No acute intracranial process identified. Details and other findings as noted above.                                         X-Ray Chest 1 View (In process)                      Medications   labetaloL injection 10 mg (10 mg Intravenous Given 2/25/23 1087)     Medical Decision Making:   Initial Assessment:   Pt presents with ams   Differential Diagnosis:   Stroke   Met enceph   Dementia                         Clinical Impression:   Final diagnoses:  [R41.82] AMS (altered mental status)  [R41.82] Altered mental status, unspecified altered mental status type (Primary)        ED Disposition Condition    Observation Stable                Tor Alberto MD  02/25/23 7675

## 2023-02-25 NOTE — ASSESSMENT & PLAN NOTE
-2/2 psych condition?  -unclear if patient has been taking her medications at home   -no acute toxic/metabolic source for her altered mentation and CT head with no acute findings

## 2023-02-26 VITALS
TEMPERATURE: 99 F | HEIGHT: 66 IN | RESPIRATION RATE: 18 BRPM | BODY MASS INDEX: 28.34 KG/M2 | DIASTOLIC BLOOD PRESSURE: 80 MMHG | WEIGHT: 176.38 LBS | HEART RATE: 77 BPM | OXYGEN SATURATION: 94 % | SYSTOLIC BLOOD PRESSURE: 145 MMHG

## 2023-02-26 LAB
ANION GAP SERPL CALC-SCNC: 12 MEQ/L
B PERT.PT PRMT NPH QL NAA+NON-PROBE: NOT DETECTED
BASOPHILS # BLD AUTO: 0.02 X10(3)/MCL (ref 0–0.2)
BASOPHILS NFR BLD AUTO: 0.2 %
BUN SERPL-MCNC: 20.7 MG/DL (ref 9.8–20.1)
C PNEUM DNA NPH QL NAA+NON-PROBE: NOT DETECTED
CALCIUM SERPL-MCNC: 9.5 MG/DL (ref 8.4–10.2)
CHLORIDE SERPL-SCNC: 104 MMOL/L (ref 98–107)
CO2 SERPL-SCNC: 22 MMOL/L (ref 22–29)
CREAT SERPL-MCNC: 1.01 MG/DL (ref 0.55–1.02)
CREAT/UREA NIT SERPL: 20
EOSINOPHIL # BLD AUTO: 0.6 X10(3)/MCL (ref 0–0.9)
EOSINOPHIL NFR BLD AUTO: 4.7 %
ERYTHROCYTE [DISTWIDTH] IN BLOOD BY AUTOMATED COUNT: 16.8 % (ref 11.5–17)
ERYTHROCYTE [SEDIMENTATION RATE] IN BLOOD: 98 MM/HR (ref 0–20)
GFR SERPLBLD CREATININE-BSD FMLA CKD-EPI: >60 MLS/MIN/1.73/M2
GLUCOSE SERPL-MCNC: 116 MG/DL (ref 74–100)
HADV DNA NPH QL NAA+NON-PROBE: NOT DETECTED
HCOV 229E RNA NPH QL NAA+NON-PROBE: NOT DETECTED
HCOV HKU1 RNA NPH QL NAA+NON-PROBE: NOT DETECTED
HCOV NL63 RNA NPH QL NAA+NON-PROBE: NOT DETECTED
HCOV OC43 RNA NPH QL NAA+NON-PROBE: NOT DETECTED
HCT VFR BLD AUTO: 30.4 % (ref 37–47)
HGB BLD-MCNC: 9.4 G/DL (ref 12–16)
HMPV RNA NPH QL NAA+NON-PROBE: NOT DETECTED
HPIV1 RNA NPH QL NAA+NON-PROBE: NOT DETECTED
HPIV2 RNA NPH QL NAA+NON-PROBE: NOT DETECTED
HPIV3 RNA NPH QL NAA+NON-PROBE: NOT DETECTED
HPIV4 RNA NPH QL NAA+NON-PROBE: NOT DETECTED
IMM GRANULOCYTES # BLD AUTO: 0.07 X10(3)/MCL (ref 0–0.04)
IMM GRANULOCYTES NFR BLD AUTO: 0.6 %
LYMPHOCYTES # BLD AUTO: 2.6 X10(3)/MCL (ref 0.6–4.6)
LYMPHOCYTES NFR BLD AUTO: 20.5 %
M PNEUMO DNA NPH QL NAA+NON-PROBE: NOT DETECTED
MAGNESIUM SERPL-MCNC: 1.9 MG/DL (ref 1.6–2.6)
MCH RBC QN AUTO: 25.9 PG
MCHC RBC AUTO-ENTMCNC: 30.9 G/DL (ref 33–36)
MCV RBC AUTO: 83.7 FL (ref 80–94)
MONOCYTES # BLD AUTO: 0.87 X10(3)/MCL (ref 0.1–1.3)
MONOCYTES NFR BLD AUTO: 6.9 %
NEUTROPHILS # BLD AUTO: 8.51 X10(3)/MCL (ref 2.1–9.2)
NEUTROPHILS NFR BLD AUTO: 67.1 %
PLATELET # BLD AUTO: 392 X10(3)/MCL (ref 130–400)
PMV BLD AUTO: 9.5 FL (ref 7.4–10.4)
POTASSIUM SERPL-SCNC: 3.5 MMOL/L (ref 3.5–5.1)
RBC # BLD AUTO: 3.63 X10(6)/MCL (ref 4.2–5.4)
RSV RNA NPH QL NAA+NON-PROBE: NOT DETECTED
RV+EV RNA NPH QL NAA+NON-PROBE: NOT DETECTED
SODIUM SERPL-SCNC: 138 MMOL/L (ref 136–145)
WBC # SPEC AUTO: 12.7 X10(3)/MCL (ref 4.5–11.5)

## 2023-02-26 PROCEDURE — G0378 HOSPITAL OBSERVATION PER HR: HCPCS

## 2023-02-26 PROCEDURE — 94760 N-INVAS EAR/PLS OXIMETRY 1: CPT

## 2023-02-26 PROCEDURE — 96361 HYDRATE IV INFUSION ADD-ON: CPT

## 2023-02-26 PROCEDURE — 25000003 PHARM REV CODE 250: Performed by: STUDENT IN AN ORGANIZED HEALTH CARE EDUCATION/TRAINING PROGRAM

## 2023-02-26 PROCEDURE — 87798 DETECT AGENT NOS DNA AMP: CPT | Performed by: STUDENT IN AN ORGANIZED HEALTH CARE EDUCATION/TRAINING PROGRAM

## 2023-02-26 PROCEDURE — 63600175 PHARM REV CODE 636 W HCPCS: Performed by: STUDENT IN AN ORGANIZED HEALTH CARE EDUCATION/TRAINING PROGRAM

## 2023-02-26 PROCEDURE — 96372 THER/PROPH/DIAG INJ SC/IM: CPT | Performed by: STUDENT IN AN ORGANIZED HEALTH CARE EDUCATION/TRAINING PROGRAM

## 2023-02-26 PROCEDURE — 83735 ASSAY OF MAGNESIUM: CPT | Performed by: STUDENT IN AN ORGANIZED HEALTH CARE EDUCATION/TRAINING PROGRAM

## 2023-02-26 PROCEDURE — 85651 RBC SED RATE NONAUTOMATED: CPT | Performed by: STUDENT IN AN ORGANIZED HEALTH CARE EDUCATION/TRAINING PROGRAM

## 2023-02-26 PROCEDURE — 85025 COMPLETE CBC W/AUTO DIFF WBC: CPT | Performed by: STUDENT IN AN ORGANIZED HEALTH CARE EDUCATION/TRAINING PROGRAM

## 2023-02-26 PROCEDURE — 87633 RESP VIRUS 12-25 TARGETS: CPT | Performed by: STUDENT IN AN ORGANIZED HEALTH CARE EDUCATION/TRAINING PROGRAM

## 2023-02-26 PROCEDURE — 25000003 PHARM REV CODE 250: Performed by: NURSE PRACTITIONER

## 2023-02-26 PROCEDURE — 80048 BASIC METABOLIC PNL TOTAL CA: CPT | Performed by: STUDENT IN AN ORGANIZED HEALTH CARE EDUCATION/TRAINING PROGRAM

## 2023-02-26 RX ORDER — CETIRIZINE HYDROCHLORIDE 10 MG/1
10 TABLET ORAL DAILY
Status: DISCONTINUED | OUTPATIENT
Start: 2023-02-26 | End: 2023-02-26 | Stop reason: HOSPADM

## 2023-02-26 RX ADMIN — CETIRIZINE HYDROCHLORIDE 10 MG: 10 TABLET, FILM COATED ORAL at 11:02

## 2023-02-26 RX ADMIN — FOLIC ACID 1 MG: 1 TABLET ORAL at 09:02

## 2023-02-26 RX ADMIN — LABETALOL HYDROCHLORIDE 200 MG: 200 TABLET, FILM COATED ORAL at 09:02

## 2023-02-26 RX ADMIN — NIFEDIPINE 60 MG: 30 TABLET, FILM COATED, EXTENDED RELEASE ORAL at 09:02

## 2023-02-26 RX ADMIN — ASPIRIN 81 MG: 81 TABLET, COATED ORAL at 09:02

## 2023-02-26 RX ADMIN — FAMOTIDINE 20 MG: 20 TABLET, FILM COATED ORAL at 09:02

## 2023-02-26 RX ADMIN — ENOXAPARIN SODIUM 40 MG: 40 INJECTION SUBCUTANEOUS at 05:02

## 2023-02-26 RX ADMIN — SERTRALINE HYDROCHLORIDE 50 MG: 50 TABLET ORAL at 09:02

## 2023-02-26 RX ADMIN — LEVOTHYROXINE SODIUM 150 MCG: 0.1 TABLET ORAL at 06:02

## 2023-02-26 RX ADMIN — LISINOPRIL 20 MG: 10 TABLET ORAL at 09:02

## 2023-02-26 NOTE — PLAN OF CARE
Problem: Adult Inpatient Plan of Care  Goal: Plan of Care Review  Outcome: Ongoing, Progressing  Goal: Patient-Specific Goal (Individualized)  Outcome: Ongoing, Progressing  Flowsheets (Taken 2/26/2023 0307)  Anxieties, Fears or Concerns: Pt does not voince any concerns and/or anxieties at this time  Individualized Care Needs: Assistance with ADL's and someone to make sure pt takes her medications as prescribed.  Goal: Absence of Hospital-Acquired Illness or Injury  Outcome: Ongoing, Progressing  Goal: Optimal Comfort and Wellbeing  Outcome: Ongoing, Progressing  Goal: Readiness for Transition of Care  Outcome: Ongoing, Progressing     Problem: Skin Injury Risk Increased  Goal: Skin Health and Integrity  Outcome: Ongoing, Progressing  Intervention: Optimize Skin Protection  Flowsheets (Taken 2/26/2023 0307)  Pressure Reduction Techniques: frequent weight shift encouraged  Skin Protection: incontinence pads utilized  Head of Bed (HOB) Positioning: HOB elevated     Problem: Infection  Goal: Absence of Infection Signs and Symptoms  Outcome: Ongoing, Progressing  Intervention: Prevent or Manage Infection  Flowsheets (Taken 2/26/2023 0307)  Fever Reduction/Comfort Measures: fluid intake increased  Infection Management: aseptic technique maintained  Isolation Precautions: protective

## 2023-02-26 NOTE — SUBJECTIVE & OBJECTIVE
Interval History:  No acute events overnight, continues to complain of pruritis    Review of Systems   All other systems reviewed and are negative.  Objective:     Vital Signs (Most Recent):  Temp: 98 °F (36.7 °C) (02/26/23 0725)  Pulse: 74 (02/26/23 0920)  Resp: 18 (02/26/23 0852)  BP: (!) 148/78 (02/26/23 0920)  SpO2: 97 % (02/26/23 0852)   Vital Signs (24h Range):  Temp:  [98 °F (36.7 °C)-99 °F (37.2 °C)] 98 °F (36.7 °C)  Pulse:  [73-86] 74  Resp:  [18-19] 18  SpO2:  [93 %-97 %] 97 %  BP: (124-165)/(69-91) 148/78     Weight: 80 kg (176 lb 5.9 oz)  Body mass index is 28.47 kg/m².    Intake/Output Summary (Last 24 hours) at 2/26/2023 1025  Last data filed at 2/26/2023 0908  Gross per 24 hour   Intake 900 ml   Output --   Net 900 ml      Physical Exam  Constitutional:       General: She is not in acute distress.     Appearance: She is obese.   HENT:      Head: Normocephalic and atraumatic.      Nose: No congestion.   Cardiovascular:      Rate and Rhythm: Normal rate and regular rhythm.      Heart sounds: No murmur heard.  Pulmonary:      Effort: Pulmonary effort is normal.      Breath sounds: Normal breath sounds.   Abdominal:      General: Bowel sounds are normal. There is no distension.      Palpations: Abdomen is soft. There is no mass.      Tenderness: There is no abdominal tenderness.   Musculoskeletal:         General: Normal range of motion.   Skin:     General: Skin is warm.      Findings: Rash (L thigh) present. No lesion.   Neurological:      General: No focal deficit present.      Cranial Nerves: No cranial nerve deficit.   Psychiatric:         Attention and Perception: Attention normal.         Mood and Affect: Mood normal. Affect is flat, tearful and inappropriate.         Speech: She is noncommunicative.         Behavior: Behavior is slowed and withdrawn.         Judgment: Judgment is inappropriate.       Significant Labs: All pertinent labs within the past 24 hours have been reviewed.    Significant  Imaging: I have reviewed all pertinent imaging results/findings within the past 24 hours.

## 2023-02-26 NOTE — HOSPITAL COURSE
Patient admitted for debilitating mental status at home.  Her partner reports he is unable to care for her therefore decided to bring her into the ED. On prior recent admission patient was started on levothyroxine for uncontrolled hypothyroidism, TSH improved and will maintain on current dosage.  Patient remains at what I know her baseline mental status to be- she is poorly communicative and significantly debilitated.  On prior admission CT head found pronounced microvascular change and MRI found small parietal acute to subacute nonhemorrhagic infarct.  Further neurologic workup for reasons why her mental status is so poor yielded a nonreactive syphilis/HIV, B12 was also within normal limits.  Folate was low and she was started on folic acid replacement.  At this point could potentially benefit from lumbar puncture for further evaluation of encephalitis however if further neurologic workup yields no findings she would continue to benefit from inpatient psychiatric evaluation.  Pt to be transferred to [facility] for further neuro evaluation given that tele psychiatry on 02/25 requested further neuro workup prior to considering inpatient psych.

## 2023-02-26 NOTE — PLAN OF CARE
[facility]  Initial Discharge Assessment       Primary Care Provider: LIANNA Paul    Admission Diagnosis: Altered mental status, unspecified altered mental status type [R41.82]  AMS (altered mental status) [R41.82]    Admission Date: 2/25/2023  Expected Discharge Date:          Payor: MEDICAID / Plan: [place] [place] [place] / Product Type: Managed Medicaid /     Extended Emergency Contact Information  Primary Emergency Contact: Felix flores  Mobile Phone: [phone]  Relation: Significant other    Discharge Plan A: Home with family, Home Health  Discharge Plan B: New Nursing Home placement - senior care care facility      [id number] [place], [place] - [address] AT [facility] [address]  [address]  [facility] [address]  Phone: [phone] Fax: [phone]      Initial Assessment (most recent)       Adult Discharge Assessment - 02/26/23 0837          Discharge Assessment    Assessment Type Discharge Planning Assessment     Confirmed/corrected address, phone number and insurance Yes     Confirmed Demographics Correct on Facesheet     Source of Information family     If unable to respond/provide information was family/caregiver contacted? Yes     Contact Name/Number Norberto spouse [phone]     Does patient/caregiver understand observation status Yes     Communicated LUIS ANTONIO with patient/caregiver Date not available/Unable to determine     Reason For Admission MARY     People in Home spouse     Facility Arrived From: Home     Do you expect to return to your current living situation? Yes     Do you have help at home or someone to help you manage your care at home? Yes     Who are your caregiver(s) and their phone number(s)? Norberto spouse [phone]     Prior to hospitilization cognitive status: Not Oriented to Place     Current cognitive status: Not Oriented to Place     Walking or Climbing Stairs ambulation difficulty, requires equipment      Mobility Management walker     Home Layout Able to live on 1st floor     Equipment Currently Used at Home walker, rolling     Readmission within 30 days? No     Patient currently being followed by outpatient case management? No     Do you currently have service(s) that help you manage your care at home? No     Do you take prescription medications? Yes     Do you have prescription coverage? Yes     Coverage [employer]     Do you have any problems affording any of your prescribed medications? TBD     Is the patient taking medications as prescribed? yes     Who is going to help you get home at discharge? Norberto spouse [phone]     How do you get to doctors appointments? family or friend will provide     Are you on dialysis? No     Do you take coumadin? No     Discharge Plan A Home with family;Home Health     Discharge Plan B New Nursing Home placement - residential care facility     DME Needed Upon Discharge  walker, rolling     Discharge Plan discussed with: Spouse/sig other     Name(s) and Number(s) Norberto spouse [phone]        Physical Activity    On average, how many days per week do you engage in moderate to strenuous exercise (like a brisk walk)? 0 days     On average, how many minutes do you engage in exercise at this level? 0 min        Financial Resource Strain    How hard is it for you to pay for the very basics like food, housing, medical care, and heating? Not hard at all        Housing Stability    In the last 12 months, was there a time when you were not able to pay the mortgage or rent on time? No     In the last 12 months, how many places have you lived? 1     In the last 12 months, was there a time when you did not have a steady place to sleep or slept in a shelter (including now)? No        Transportation Needs    In the past 12 months, has lack of transportation kept you from medical appointments or from getting medications? No     In the past 12 months, has lack of transportation kept  you from meetings, work, or from getting things needed for daily living? No        Food Insecurity    Within the past 12 months, you worried that your food would run out before you got the money to buy more. Never true     Within the past 12 months, the food you bought just didn't last and you didn't have money to get more. Never true        Stress    Do you feel stress - tense, restless, nervous, or anxious, or unable to sleep at night because your mind is troubled all the time - these days? Only a little        Social Connections    In a typical week, how many times do you talk on the phone with family, friends, or neighbors? More than three times a week     How often do you get together with friends or relatives? More than three times a week     How often do you attend Christian or Temple services? 1 to 4 times per year     Do you belong to any clubs or organizations such as Christian groups, unions, fraternal or athletic groups, or school groups? No     How often do you attend meetings of the clubs or organizations you belong to? 1 to 4 times per year     Are you , , , , never , or living with a partner?         Alcohol Use    Q1: How often do you have a drink containing alcohol? Never     Q2: How many drinks containing alcohol do you have on a typical day when you are drinking? Patient does not drink     Q3: How often do you have six or more drinks on one occasion? Never        OTHER    Name(s) of People in Home Norberto spouse [phone]

## 2023-02-26 NOTE — ASSESSMENT & PLAN NOTE
-2/2 psych condition?  -unclear if patient has been taking her medications at home   -no acute toxic/metabolic source for her altered mentation and repeat CT head with no acute findings  -transfer for neuro eval for potential LP

## 2023-02-26 NOTE — PROGRESS NOTES
[facility]  [facility] Medicine  Progress Note    Patient Name: Alysia Washington  MRN: [id number]  Patient Class: OP- Observation   Admission Date: 2/25/2023  Length of Stay: 0 days  Attending Physician: Thairy G Reyes, DO  Primary Care Provider: LIANNA Paul        Subjective:     Principal Problem:Acute kidney insufficiency        HPI:  52-year-old female with a past medical history of hypothyroidism, chronic microvascular cerebral ischemia, nonhemorrhagic infarcts, major depressive disorder.  Patient is not very vocal however she does respond appropriately. 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  Reportedly she was brought in by her boyfriend secondary to altered mental status at home.  Her boyfriend reports that she is at home alone the majority of the day while he is at work and he would return home and find her soiled and sitting in her feces. Workup in the ED with no acute findings CT head once again saw multiple lacunar infarcts of the right basal ganglia however these were present on CT head done 01/29/2023.  Patient remains with poor cognition/insight and only responsive with yes or no and one-word answers.      Overview/Hospital Course:  Patient admitted for debilitating mental status at home.  Her partner reports he is unable to care for her therefore decided to bring her into the ED. On prior recent admission patient was started on levothyroxine for uncontrolled hypothyroidism, TSH improved and will maintain on current dosage.  Patient remains at what I know her baseline mental status to be- she is poorly communicative and significantly debilitated.  On prior admission CT head found pronounced microvascular change and MRI found small parietal acute to subacute nonhemorrhagic infarct.  Further neurologic workup for reasons why her mental status is so poor yielded a nonreactive syphilis/HIV, B12 was also  within normal limits.  Folate was low and she was started on folic acid replacement.  At this point could potentially benefit from lumbar puncture for further evaluation of encephalitis however if further neurologic workup yields no findings she would continue to benefit from inpatient psychiatric evaluation.  Attempting to transfer given that evaluation with tele psychiatry on 02/25 requested further neuro workup prior to considering inpatient psych.      Interval History:  No acute events overnight, continues to complain of pruritis    Review of Systems   All other systems reviewed and are negative.  Objective:     Vital Signs (Most Recent):  Temp: 98 °F (36.7 °C) (02/26/23 0725)  Pulse: 74 (02/26/23 0920)  Resp: 18 (02/26/23 0852)  BP: (!) 148/78 (02/26/23 0920)  SpO2: 97 % (02/26/23 0852)   Vital Signs (24h Range):  Temp:  [98 °F (36.7 °C)-99 °F (37.2 °C)] 98 °F (36.7 °C)  Pulse:  [73-86] 74  Resp:  [18-19] 18  SpO2:  [93 %-97 %] 97 %  BP: (124-165)/(69-91) 148/78     Weight: 80 kg (176 lb 5.9 oz)  Body mass index is 28.47 kg/m².    Intake/Output Summary (Last 24 hours) at 2/26/2023 1025  Last data filed at 2/26/2023 0908  Gross per 24 hour   Intake 900 ml   Output --   Net 900 ml      Physical Exam  Constitutional:       General: She is not in acute distress.     Appearance: She is obese.   HENT:      Head: Normocephalic and atraumatic.      Nose: No congestion.   Cardiovascular:      Rate and Rhythm: Normal rate and regular rhythm.      Heart sounds: No murmur heard.  Pulmonary:      Effort: Pulmonary effort is normal.      Breath sounds: Normal breath sounds.   Abdominal:      General: Bowel sounds are normal. There is no distension.      Palpations: Abdomen is soft. There is no mass.      Tenderness: There is no abdominal tenderness.   Musculoskeletal:         General: Normal range of motion.   Skin:     General: Skin is warm.      Findings: Rash (L thigh) present. No lesion.   Neurological:      General: No  focal deficit present.      Cranial Nerves: No cranial nerve deficit.   Psychiatric:         Attention and Perception: Attention normal.         Mood and Affect: Mood normal. Affect is flat, tearful and inappropriate.         Speech: She is noncommunicative.         Behavior: Behavior is slowed and withdrawn.         Judgment: Judgment is inappropriate.       Significant Labs: All pertinent labs within the past 24 hours have been reviewed.    Significant Imaging: I have reviewed all pertinent imaging results/findings within the past 24 hours.      Assessment/Plan:      * Acute kidney insufficiency  -resolved with IVF    AMS (altered mental status)  -2/2 psych condition?  -unclear if patient has been taking her medications at home   -no acute toxic/metabolic source for her altered mentation and repeat CT head with no acute findings  -transfer for neuro eval for potential LP      Hypothyroidism  -patient with profound hypothyroidism on last admission with a TSH in the 150s   -she was started on levothyroxine 150 mcg and TSH has responded appropriately   -I have reviewed her home medications continue with current management no changes at this time      Essential hypertension  -continue with nifedipine, lisinopril, labetalol      MDD (major depressive disorder)  -Telepsych discontinued risperidone, increased Zoloft to 50 mg p.o. daily and recommend starting Abilify q.h.s. They have also recommended PEC vs transfer for neuro      Stroke  -CT head found pronounced microvascular change  -MRI on prior admission found small parietal lobe acute to subacute nonhemorrhagic infarct  -aspirin, atorvastatin, BP control  -ascvd risk of 72.3%    Illicit drug use  -opiate positive in the ED on UDS        VTE Risk Mitigation (From admission, onward)         Ordered     enoxaparin injection 40 mg  Daily         02/25/23 0649     Place sequential compression device  Until discontinued         02/25/23 0649     IP VTE LOW RISK PATIENT   Once         02/25/23 0628     Place TRINITY hose  Until discontinued         02/25/23 0628                Discharge Planning   LUIS ANTONIO:      Code Status: Full Code   Is the patient medically ready for discharge?:     Reason for patient still in hospital (select all that apply): Patient trending condition, Treatment, Consult recommendations and Pending disposition  Discharge Plan A: Home with family, Home Health                  Thairy G Reyes, DO  Department of Hospital Medicine   [facility]

## 2023-02-26 NOTE — ASSESSMENT & PLAN NOTE
-Telepsych discontinued risperidone, increased Zoloft to 50 mg p.o. daily and recommend starting Abilify q.h.s. They have also recommended PEC vs transfer for neuro

## 2023-02-26 NOTE — PLAN OF CARE
Problem: Adult Inpatient Plan of Care  Goal: Plan of Care Review  Outcome: Ongoing, Progressing  Flowsheets (Taken 2/26/2023 1153)  Plan of Care Reviewed With: patient  Goal: Patient-Specific Goal (Individualized)  Outcome: Ongoing, Progressing  Flowsheets (Taken 2/26/2023 1153)  Anxieties, Fears or Concerns: Pt denies anxities, fears, or concerns  Individualized Care Needs: Improve mobility and encourage medication intake  Goal: Absence of Hospital-Acquired Illness or Injury  Outcome: Ongoing, Progressing  Intervention: Identify and Manage Fall Risk  Flowsheets (Taken 2/26/2023 1153)  Safety Promotion/Fall Prevention:   assistive device/personal item within reach   bed alarm set   gait belt with ambulation   lighting adjusted   medications reviewed   nonskid shoes/socks when out of bed   room near unit station   instructed to call staff for mobility  Intervention: Prevent Skin Injury  Flowsheets (Taken 2/26/2023 1153)  Body Position: left  Skin Protection:   adhesive use limited   incontinence pads utilized   skin-to-device areas padded   skin sealant/moisture barrier applied   skin-to-skin areas padded   transparent dressing maintained   tubing/devices free from skin contact  Intervention: Prevent and Manage VTE (Venous Thromboembolism) Risk  Flowsheets (Taken 2/26/2023 1153)  Activity Management: Rolling - L1  VTE Prevention/Management: fluids promoted  Range of Motion: active ROM (range of motion) encouraged  Intervention: Prevent Infection  Flowsheets (Taken 2/26/2023 1153)  Infection Prevention:   environmental surveillance performed   equipment surfaces disinfected   hand hygiene promoted   personal protective equipment utilized   single patient room provided  Goal: Optimal Comfort and Wellbeing  Outcome: Ongoing, Progressing  Intervention: Monitor Pain and Promote Comfort  Flowsheets (Taken 2/26/2023 1153)  Pain Management Interventions:   care clustered   pillow support provided   position adjusted    quiet environment facilitated   relaxation techniques promoted  Intervention: Provide Person-Centered Care  Flowsheets (Taken 2/26/2023 1153)  Trust Relationship/Rapport:   care explained   empathic listening provided   questions encouraged   reassurance provided   thoughts/feelings acknowledged  Goal: Readiness for Transition of Care  Outcome: Ongoing, Progressing     Problem: Skin Injury Risk Increased  Goal: Skin Health and Integrity  Outcome: Ongoing, Progressing  Intervention: Optimize Skin Protection  Flowsheets (Taken 2/26/2023 1153)  Pressure Reduction Techniques:   frequent weight shift encouraged   heels elevated off bed   weight shift assistance provided  Pressure Reduction Devices: positioning supports utilized  Skin Protection:   adhesive use limited   incontinence pads utilized   skin-to-device areas padded   skin sealant/moisture barrier applied   skin-to-skin areas padded   transparent dressing maintained   tubing/devices free from skin contact  Head of Bed (HOB) Positioning: HOB at 30-45 degrees  Intervention: Promote and Optimize Oral Intake  Flowsheets (Taken 2/26/2023 1153)  Oral Nutrition Promotion: calorie-dense foods provided     Problem: Infection  Goal: Absence of Infection Signs and Symptoms  Outcome: Ongoing, Progressing  Intervention: Prevent or Manage Infection  Flowsheets (Taken 2/26/2023 1153)  Fever Reduction/Comfort Measures:   fluid intake increased   lightweight bedding  Infection Management: aseptic technique maintained  Isolation Precautions:   protective   precautions maintained     Problem: Fall Injury Risk  Goal: Absence of Fall and Fall-Related Injury  Outcome: Ongoing, Progressing  Intervention: Identify and Manage Contributors  Flowsheets (Taken 2/26/2023 1153)  Self-Care Promotion:   BADL personal objects within reach   meal set-up provided   safe use of adaptive equipment encouraged  Medication Review/Management: medications reviewed  Intervention: Promote Injury-Free  Environment  Flowsheets (Taken 2/26/2023 1153)  Safety Promotion/Fall Prevention:   assistive device/personal item within reach   bed alarm set   gait belt with ambulation   lighting adjusted   medications reviewed   nonskid shoes/socks when out of bed   room near unit station   instructed to call staff for mobility

## 2023-02-27 NOTE — PSYCH
Called to complete telepsych follow up visit for today.  I was advised that Ms. Washington is with EMS right now preparing to transfer to [facility].

## 2023-02-27 NOTE — NURSING
Patient notified, unable to sign transfer form,Call placed to next of kin listed in patients chart, Corby @ [phone], no answer, LV for Mr Navarro to call, [phone] for update on status of patient.  Report called to [facility], [phone], spoke to Ortiz Lopez RN. [facility] notified of transfer. Packet with all images on disc, AVS and facesheet prepared to send with [facility].

## 2023-03-07 NOTE — PLAN OF CARE
[facility]  Discharge Final Note    Primary Care Provider: LIANNA Paul    Expected Discharge Date:     Final Discharge Note (most recent)       Final Note - 03/07/23 0655          Final Note    Assessment Type Final Discharge Note     Anticipated Dis[facility] Disposition [facility] with [facility]     What phone number can be called within the next 1-3 days to see how you are doing after discharge? [phone]     Hospital Resources/Appts/Education Provided Provided patient/caregiver with written discharge plan information                     Important Message from Medicare

## 2023-04-28 ENCOUNTER — LAB REQUISITION (OUTPATIENT)
Dept: LAB | Facility: HOSPITAL | Age: 53
End: 2023-04-28
Payer: MEDICAID

## 2023-04-28 DIAGNOSIS — E03.9 HYPOTHYROIDISM, UNSPECIFIED: ICD-10-CM

## 2023-04-28 DIAGNOSIS — E78.5 HYPERLIPIDEMIA, UNSPECIFIED: ICD-10-CM

## 2023-04-28 DIAGNOSIS — D64.9 ANEMIA, UNSPECIFIED: ICD-10-CM

## 2023-04-28 DIAGNOSIS — N18.9 CHRONIC KIDNEY DISEASE, UNSPECIFIED: ICD-10-CM

## 2023-04-28 DIAGNOSIS — E11.9 TYPE 2 DIABETES MELLITUS WITHOUT COMPLICATIONS: ICD-10-CM

## 2023-04-28 DIAGNOSIS — R56.9 UNSPECIFIED CONVULSIONS: ICD-10-CM

## 2023-04-28 LAB
ALBUMIN SERPL-MCNC: 2.3 G/DL (ref 3.5–5)
ALBUMIN/GLOB SERPL: 0.6 RATIO (ref 1.1–2)
ALP SERPL-CCNC: 83 UNIT/L (ref 40–150)
ALT SERPL-CCNC: 10 UNIT/L (ref 0–55)
AST SERPL-CCNC: 10 UNIT/L (ref 5–34)
BASOPHILS # BLD AUTO: 0.09 X10(3)/MCL (ref 0–0.2)
BASOPHILS NFR BLD AUTO: 0.6 %
BILIRUBIN DIRECT+TOT PNL SERPL-MCNC: 0.2 MG/DL
BUN SERPL-MCNC: 11.8 MG/DL (ref 9.8–20.1)
CALCIUM SERPL-MCNC: 8.9 MG/DL (ref 8.4–10.2)
CHLORIDE SERPL-SCNC: 106 MMOL/L (ref 98–107)
CHOLEST SERPL-MCNC: 112 MG/DL
CHOLEST/HDLC SERPL: 9 {RATIO} (ref 0–5)
CO2 SERPL-SCNC: 25 MMOL/L (ref 22–29)
CREAT SERPL-MCNC: 0.82 MG/DL (ref 0.55–1.02)
EOSINOPHIL # BLD AUTO: 0.62 X10(3)/MCL (ref 0–0.9)
EOSINOPHIL NFR BLD AUTO: 4 %
ERYTHROCYTE [DISTWIDTH] IN BLOOD BY AUTOMATED COUNT: 18.2 % (ref 11.5–17)
EST. AVERAGE GLUCOSE BLD GHB EST-MCNC: 142.7 MG/DL
GFR SERPLBLD CREATININE-BSD FMLA CKD-EPI: >60 MLS/MIN/1.73/M2
GLOBULIN SER-MCNC: 3.8 GM/DL (ref 2.4–3.5)
GLUCOSE SERPL-MCNC: 109 MG/DL (ref 74–100)
HBA1C MFR BLD: 6.6 %
HCT VFR BLD AUTO: 27.2 % (ref 37–47)
HDLC SERPL-MCNC: 13 MG/DL (ref 35–60)
HGB BLD-MCNC: 8.2 G/DL (ref 12–16)
IMM GRANULOCYTES # BLD AUTO: 0.41 X10(3)/MCL (ref 0–0.04)
IMM GRANULOCYTES NFR BLD AUTO: 2.7 %
LDLC SERPL CALC-MCNC: 74 MG/DL (ref 50–140)
LYMPHOCYTES # BLD AUTO: 2.6 X10(3)/MCL (ref 0.6–4.6)
LYMPHOCYTES NFR BLD AUTO: 16.9 %
MCH RBC QN AUTO: 23.9 PG (ref 27–31)
MCHC RBC AUTO-ENTMCNC: 30.1 G/DL (ref 33–36)
MCV RBC AUTO: 79.3 FL (ref 80–94)
MONOCYTES # BLD AUTO: 2.14 X10(3)/MCL (ref 0.1–1.3)
MONOCYTES NFR BLD AUTO: 13.9 %
NEUTROPHILS # BLD AUTO: 9.53 X10(3)/MCL (ref 2.1–9.2)
NEUTROPHILS NFR BLD AUTO: 61.9 %
NRBC BLD AUTO-RTO: 0 %
PLATELET # BLD AUTO: 458 X10(3)/MCL (ref 130–400)
PMV BLD AUTO: 10.3 FL (ref 7.4–10.4)
POTASSIUM SERPL-SCNC: 4 MMOL/L (ref 3.5–5.1)
PROT SERPL-MCNC: 6.1 GM/DL (ref 6.4–8.3)
RBC # BLD AUTO: 3.43 X10(6)/MCL (ref 4.2–5.4)
SODIUM SERPL-SCNC: 145 MMOL/L (ref 136–145)
T3RU NFR SERPL: 38.1 % (ref 31–39)
T4 SERPL-MCNC: 7.7 UG/DL (ref 4.87–11.72)
TRIGL SERPL-MCNC: 125 MG/DL (ref 37–140)
TSH SERPL-ACNC: 0.08 UIU/ML (ref 0.35–4.94)
VALPROATE SERPL-MCNC: 25.3 UG/ML (ref 50–100)
VLDLC SERPL CALC-MCNC: 25 MG/DL
WBC # SPEC AUTO: 15.4 X10(3)/MCL (ref 4.5–11.5)

## 2023-04-28 PROCEDURE — 80053 COMPREHEN METABOLIC PANEL: CPT | Performed by: INTERNAL MEDICINE

## 2023-04-28 PROCEDURE — 84443 ASSAY THYROID STIM HORMONE: CPT | Performed by: INTERNAL MEDICINE

## 2023-04-28 PROCEDURE — 80061 LIPID PANEL: CPT | Performed by: INTERNAL MEDICINE

## 2023-04-28 PROCEDURE — 85025 COMPLETE CBC W/AUTO DIFF WBC: CPT | Performed by: INTERNAL MEDICINE

## 2023-04-28 PROCEDURE — 84436 ASSAY OF TOTAL THYROXINE: CPT | Performed by: INTERNAL MEDICINE

## 2023-04-28 PROCEDURE — 83036 HEMOGLOBIN GLYCOSYLATED A1C: CPT | Performed by: INTERNAL MEDICINE

## 2023-04-28 PROCEDURE — 80164 ASSAY DIPROPYLACETIC ACD TOT: CPT | Performed by: INTERNAL MEDICINE

## 2023-04-28 PROCEDURE — 84479 ASSAY OF THYROID (T3 OR T4): CPT | Performed by: INTERNAL MEDICINE

## 2023-05-01 PROCEDURE — 87186 SC STD MICRODIL/AGAR DIL: CPT | Performed by: INTERNAL MEDICINE

## 2023-05-01 PROCEDURE — 87088 URINE BACTERIA CULTURE: CPT | Performed by: INTERNAL MEDICINE

## 2023-05-01 PROCEDURE — 81003 URINALYSIS AUTO W/O SCOPE: CPT | Performed by: INTERNAL MEDICINE

## 2023-05-02 ENCOUNTER — LAB REQUISITION (OUTPATIENT)
Dept: LAB | Facility: HOSPITAL | Age: 53
End: 2023-05-02
Payer: MEDICAID

## 2023-05-02 DIAGNOSIS — D52.9 FOLATE DEFICIENCY ANEMIA, UNSPECIFIED: ICD-10-CM

## 2023-05-02 DIAGNOSIS — E61.1 IRON DEFICIENCY: ICD-10-CM

## 2023-05-02 DIAGNOSIS — R94.6 ABNORMAL RESULTS OF THYROID FUNCTION STUDIES: ICD-10-CM

## 2023-05-02 DIAGNOSIS — E03.9 HYPOTHYROIDISM, UNSPECIFIED: ICD-10-CM

## 2023-05-02 DIAGNOSIS — N73.0 ACUTE PARAMETRITIS AND PELVIC CELLULITIS: ICD-10-CM

## 2023-05-02 LAB
APPEARANCE UR: CLEAR
BILIRUB UR QL STRIP.AUTO: NEGATIVE MG/DL
COLOR UR AUTO: NORMAL
FOLATE SERPL-MCNC: 15 NG/ML (ref 7–31.4)
GLUCOSE UR QL STRIP.AUTO: NEGATIVE MG/DL
IRON SATN MFR SERPL: 9 % (ref 20–50)
IRON SERPL-MCNC: 25 UG/DL (ref 50–170)
KETONES UR QL STRIP.AUTO: NEGATIVE MG/DL
LEUKOCYTE ESTERASE UR QL STRIP.AUTO: NEGATIVE UNIT/L
NITRITE UR QL STRIP.AUTO: NEGATIVE
PH UR STRIP.AUTO: 6.5 [PH]
PROT UR QL STRIP.AUTO: NEGATIVE MG/DL
RBC UR QL AUTO: NEGATIVE UNIT/L
SP GR UR STRIP.AUTO: 1.01
T4 FREE SERPL-MCNC: 1.11 NG/DL (ref 0.7–1.48)
TIBC SERPL-MCNC: 248 UG/DL (ref 70–310)
TIBC SERPL-MCNC: 273 UG/DL (ref 250–450)
UROBILINOGEN UR STRIP-ACNC: 0.2 MG/DL

## 2023-05-02 PROCEDURE — 84439 ASSAY OF FREE THYROXINE: CPT | Performed by: INTERNAL MEDICINE

## 2023-05-02 PROCEDURE — 82746 ASSAY OF FOLIC ACID SERUM: CPT | Performed by: INTERNAL MEDICINE

## 2023-05-02 PROCEDURE — 83550 IRON BINDING TEST: CPT | Performed by: INTERNAL MEDICINE

## 2023-05-05 ENCOUNTER — LAB REQUISITION (OUTPATIENT)
Dept: LAB | Facility: HOSPITAL | Age: 53
End: 2023-05-05
Payer: MEDICAID

## 2023-05-05 DIAGNOSIS — R56.9 UNSPECIFIED CONVULSIONS: ICD-10-CM

## 2023-05-05 DIAGNOSIS — D64.9 ANEMIA, UNSPECIFIED: ICD-10-CM

## 2023-05-05 DIAGNOSIS — N18.9 CHRONIC KIDNEY DISEASE, UNSPECIFIED: ICD-10-CM

## 2023-05-05 LAB
ABS NEUT CALC (OHS): 8.56 X10(3)/MCL (ref 2.1–9.2)
ALBUMIN SERPL-MCNC: 2.3 G/DL (ref 3.5–5)
ALBUMIN/GLOB SERPL: 0.6 RATIO (ref 1.1–2)
ALP SERPL-CCNC: 103 UNIT/L (ref 40–150)
ALT SERPL-CCNC: 7 UNIT/L (ref 0–55)
AST SERPL-CCNC: 9 UNIT/L (ref 5–34)
BACTERIA UR CULT: ABNORMAL
BILIRUBIN DIRECT+TOT PNL SERPL-MCNC: 0.1 MG/DL
BUN SERPL-MCNC: 13.2 MG/DL (ref 9.8–20.1)
CALCIUM SERPL-MCNC: 8.4 MG/DL (ref 8.4–10.2)
CHLORIDE SERPL-SCNC: 105 MMOL/L (ref 98–107)
CO2 SERPL-SCNC: 25 MMOL/L (ref 22–29)
CREAT SERPL-MCNC: 0.81 MG/DL (ref 0.55–1.02)
EOSINOPHIL NFR BLD MANUAL: 1.16 X10(3)/MCL (ref 0–0.9)
EOSINOPHIL NFR BLD MANUAL: 8 % (ref 0–8)
ERYTHROCYTE [DISTWIDTH] IN BLOOD BY AUTOMATED COUNT: 18.4 % (ref 11.5–17)
GFR SERPLBLD CREATININE-BSD FMLA CKD-EPI: >60 MLS/MIN/1.73/M2
GLOBULIN SER-MCNC: 3.6 GM/DL (ref 2.4–3.5)
GLUCOSE SERPL-MCNC: 117 MG/DL (ref 74–100)
HCT VFR BLD AUTO: 27.6 % (ref 37–47)
HGB BLD-MCNC: 8.2 G/DL (ref 12–16)
LYMPHOCYTES NFR BLD MANUAL: 30 % (ref 13–40)
LYMPHOCYTES NFR BLD MANUAL: 4.35 X10(3)/MCL
MCH RBC QN AUTO: 23.9 PG (ref 27–31)
MCHC RBC AUTO-ENTMCNC: 29.7 G/DL (ref 33–36)
MCV RBC AUTO: 80.5 FL (ref 80–94)
MONOCYTES NFR BLD MANUAL: 0.44 X10(3)/MCL (ref 0.1–1.3)
MONOCYTES NFR BLD MANUAL: 3 % (ref 2–11)
NEUTROPHILS NFR BLD MANUAL: 59 % (ref 47–80)
NRBC BLD AUTO-RTO: 0 %
PLATELET # BLD AUTO: 374 X10(3)/MCL (ref 130–400)
PLATELET # BLD EST: NORMAL 10*3/UL
PMV BLD AUTO: 11.1 FL (ref 7.4–10.4)
POTASSIUM SERPL-SCNC: 4 MMOL/L (ref 3.5–5.1)
PROT SERPL-MCNC: 5.9 GM/DL (ref 6.4–8.3)
RBC # BLD AUTO: 3.43 X10(6)/MCL (ref 4.2–5.4)
RBC MORPH BLD: NORMAL
SODIUM SERPL-SCNC: 143 MMOL/L (ref 136–145)
VALPROATE SERPL-MCNC: 55.2 UG/ML (ref 50–100)
WBC # SPEC AUTO: 14.51 X10(3)/MCL (ref 4.5–11.5)

## 2023-05-05 PROCEDURE — 80164 ASSAY DIPROPYLACETIC ACD TOT: CPT | Performed by: INTERNAL MEDICINE

## 2023-05-05 PROCEDURE — 80053 COMPREHEN METABOLIC PANEL: CPT | Performed by: INTERNAL MEDICINE

## 2023-05-05 PROCEDURE — 85027 COMPLETE CBC AUTOMATED: CPT | Performed by: INTERNAL MEDICINE

## 2023-05-12 ENCOUNTER — LAB REQUISITION (OUTPATIENT)
Dept: LAB | Facility: HOSPITAL | Age: 53
End: 2023-05-12
Payer: MEDICAID

## 2023-05-12 DIAGNOSIS — D64.9 ANEMIA, UNSPECIFIED: ICD-10-CM

## 2023-05-12 LAB
ABS NEUT CALC (OHS): 8.52 X10(3)/MCL (ref 2.1–9.2)
ANISOCYTOSIS BLD QL SMEAR: ABNORMAL
EOSINOPHIL NFR BLD MANUAL: 18 % (ref 0–8)
EOSINOPHIL NFR BLD MANUAL: 2.89 X10(3)/MCL (ref 0–0.9)
ERYTHROCYTE [DISTWIDTH] IN BLOOD BY AUTOMATED COUNT: 18.6 % (ref 11.5–17)
HCT VFR BLD AUTO: 32.4 % (ref 37–47)
HGB BLD-MCNC: 9.6 G/DL (ref 12–16)
HYPOCHROMIA BLD QL SMEAR: ABNORMAL
LYMPHOCYTES NFR BLD MANUAL: 20 % (ref 13–40)
LYMPHOCYTES NFR BLD MANUAL: 3.22 X10(3)/MCL
MCH RBC QN AUTO: 23.4 PG (ref 27–31)
MCHC RBC AUTO-ENTMCNC: 29.6 G/DL (ref 33–36)
MCV RBC AUTO: 78.8 FL (ref 80–94)
METAMYELOCYTES NFR BLD MANUAL: 1 %
MICROCYTES BLD QL SMEAR: ABNORMAL
MONOCYTES NFR BLD MANUAL: 1.45 X10(3)/MCL (ref 0.1–1.3)
MONOCYTES NFR BLD MANUAL: 9 % (ref 2–11)
NEUTROPHILS NFR BLD MANUAL: 52 % (ref 47–80)
NRBC BLD AUTO-RTO: 0 %
PLATELET # BLD AUTO: 317 X10(3)/MCL (ref 130–400)
PLATELET # BLD EST: ADEQUATE 10*3/UL
PMV BLD AUTO: 10.3 FL (ref 7.4–10.4)
RBC # BLD AUTO: 4.11 X10(6)/MCL (ref 4.2–5.4)
RBC MORPH BLD: ABNORMAL
WBC # SPEC AUTO: 16.08 X10(3)/MCL (ref 4.5–11.5)

## 2023-05-12 PROCEDURE — 85025 COMPLETE CBC W/AUTO DIFF WBC: CPT | Performed by: INTERNAL MEDICINE

## 2023-05-12 PROCEDURE — 85027 COMPLETE CBC AUTOMATED: CPT | Performed by: INTERNAL MEDICINE

## 2023-05-29 PROBLEM — I63.9 STROKE: Status: RESOLVED | Noted: 2023-01-30 | Resolved: 2023-05-29

## 2023-05-29 PROBLEM — N28.9 ACUTE KIDNEY INSUFFICIENCY: Status: RESOLVED | Noted: 2023-01-30 | Resolved: 2023-05-29

## 2023-06-09 ENCOUNTER — LAB REQUISITION (OUTPATIENT)
Dept: LAB | Facility: HOSPITAL | Age: 53
End: 2023-06-09
Payer: MEDICAID

## 2023-06-09 DIAGNOSIS — E03.9 HYPOTHYROIDISM, UNSPECIFIED: ICD-10-CM

## 2023-06-09 LAB — TSH SERPL-ACNC: 7.74 UIU/ML (ref 0.35–4.94)

## 2023-06-09 PROCEDURE — 84443 ASSAY THYROID STIM HORMONE: CPT | Performed by: INTERNAL MEDICINE

## 2023-06-12 ENCOUNTER — LAB REQUISITION (OUTPATIENT)
Dept: LAB | Facility: HOSPITAL | Age: 53
End: 2023-06-12
Payer: MEDICAID

## 2023-06-12 DIAGNOSIS — E03.9 HYPOTHYROIDISM, UNSPECIFIED: ICD-10-CM

## 2023-06-12 LAB — TSH SERPL-ACNC: 7.94 UIU/ML (ref 0.35–4.94)

## 2023-06-12 PROCEDURE — 84443 ASSAY THYROID STIM HORMONE: CPT | Performed by: INTERNAL MEDICINE

## 2023-06-19 PROCEDURE — 87088 URINE BACTERIA CULTURE: CPT | Performed by: INTERNAL MEDICINE

## 2023-06-19 PROCEDURE — 81001 URINALYSIS AUTO W/SCOPE: CPT | Performed by: INTERNAL MEDICINE

## 2023-06-20 ENCOUNTER — LAB REQUISITION (OUTPATIENT)
Dept: LAB | Facility: HOSPITAL | Age: 53
End: 2023-06-20
Payer: MEDICAID

## 2023-06-20 DIAGNOSIS — N39.0 URINARY TRACT INFECTION, SITE NOT SPECIFIED: ICD-10-CM

## 2023-06-20 LAB
APPEARANCE UR: CLEAR
BACTERIA #/AREA URNS AUTO: NORMAL /HPF
BILIRUB UR QL STRIP.AUTO: NEGATIVE MG/DL
COLOR UR: YELLOW
GLUCOSE UR QL STRIP.AUTO: NEGATIVE MG/DL
KETONES UR QL STRIP.AUTO: NEGATIVE MG/DL
LEUKOCYTE ESTERASE UR QL STRIP.AUTO: ABNORMAL UNIT/L
NITRITE UR QL STRIP.AUTO: NEGATIVE
PH UR STRIP.AUTO: 6 [PH]
PROT UR QL STRIP.AUTO: NEGATIVE MG/DL
RBC #/AREA URNS AUTO: NORMAL /HPF
RBC UR QL AUTO: NEGATIVE UNIT/L
SP GR UR STRIP.AUTO: 1.02
SQUAMOUS #/AREA URNS AUTO: NORMAL /HPF
UROBILINOGEN UR STRIP-ACNC: 0.2 MG/DL
WBC #/AREA URNS AUTO: NORMAL /HPF

## 2023-06-22 LAB — BACTERIA UR CULT: NORMAL

## 2023-07-07 ENCOUNTER — LAB REQUISITION (OUTPATIENT)
Dept: LAB | Facility: HOSPITAL | Age: 53
End: 2023-07-07
Payer: MEDICAID

## 2023-07-07 DIAGNOSIS — R56.9 UNSPECIFIED CONVULSIONS: ICD-10-CM

## 2023-07-07 LAB — VALPROATE SERPL-MCNC: 67.3 UG/ML (ref 50–100)

## 2023-07-07 PROCEDURE — 80164 ASSAY DIPROPYLACETIC ACD TOT: CPT | Performed by: INTERNAL MEDICINE

## 2023-07-21 ENCOUNTER — LAB REQUISITION (OUTPATIENT)
Dept: LAB | Facility: HOSPITAL | Age: 53
End: 2023-07-21
Payer: MEDICAID

## 2023-07-21 DIAGNOSIS — E03.9 HYPOTHYROIDISM, UNSPECIFIED: ICD-10-CM

## 2023-07-21 LAB — TSH SERPL-ACNC: 4.43 UIU/ML (ref 0.35–4.94)

## 2023-07-21 PROCEDURE — 84443 ASSAY THYROID STIM HORMONE: CPT | Performed by: INTERNAL MEDICINE

## 2023-07-26 ENCOUNTER — LAB REQUISITION (OUTPATIENT)
Dept: LAB | Facility: HOSPITAL | Age: 53
End: 2023-07-26
Payer: MEDICAID

## 2023-07-26 DIAGNOSIS — E03.9 HYPOTHYROIDISM, UNSPECIFIED: ICD-10-CM

## 2023-07-26 LAB — TSH SERPL-ACNC: 2.8 UIU/ML (ref 0.35–4.94)

## 2023-07-26 PROCEDURE — 84443 ASSAY THYROID STIM HORMONE: CPT | Performed by: INTERNAL MEDICINE

## 2023-08-14 ENCOUNTER — LAB REQUISITION (OUTPATIENT)
Dept: LAB | Facility: HOSPITAL | Age: 53
End: 2023-08-14
Payer: MEDICAID

## 2023-08-14 DIAGNOSIS — D64.9 ANEMIA, UNSPECIFIED: ICD-10-CM

## 2023-08-14 DIAGNOSIS — N18.9 CHRONIC KIDNEY DISEASE, UNSPECIFIED: ICD-10-CM

## 2023-08-14 LAB
ALBUMIN SERPL-MCNC: 3.5 G/DL (ref 3.5–5)
ALBUMIN/GLOB SERPL: 0.9 RATIO (ref 1.1–2)
ALP SERPL-CCNC: 117 UNIT/L (ref 40–150)
ALT SERPL-CCNC: 16 UNIT/L (ref 0–55)
AST SERPL-CCNC: 16 UNIT/L (ref 5–34)
BASOPHILS # BLD AUTO: 0.07 X10(3)/MCL
BASOPHILS NFR BLD AUTO: 0.8 %
BILIRUB SERPL-MCNC: 0.2 MG/DL
BUN SERPL-MCNC: 24.6 MG/DL (ref 9.8–20.1)
CALCIUM SERPL-MCNC: 9.5 MG/DL (ref 8.4–10.2)
CHLORIDE SERPL-SCNC: 106 MMOL/L (ref 98–107)
CO2 SERPL-SCNC: 25 MMOL/L (ref 22–29)
CREAT SERPL-MCNC: 1.18 MG/DL (ref 0.55–1.02)
EOSINOPHIL # BLD AUTO: 0.52 X10(3)/MCL (ref 0–0.9)
EOSINOPHIL NFR BLD AUTO: 5.7 %
ERYTHROCYTE [DISTWIDTH] IN BLOOD BY AUTOMATED COUNT: 18.8 % (ref 11.5–17)
GFR SERPLBLD CREATININE-BSD FMLA CKD-EPI: 55 MLS/MIN/1.73/M2
GLOBULIN SER-MCNC: 3.8 GM/DL (ref 2.4–3.5)
GLUCOSE SERPL-MCNC: 101 MG/DL (ref 74–100)
HCT VFR BLD AUTO: 38.2 % (ref 37–47)
HGB BLD-MCNC: 11.9 G/DL (ref 12–16)
IMM GRANULOCYTES # BLD AUTO: 0.06 X10(3)/MCL (ref 0–0.04)
IMM GRANULOCYTES NFR BLD AUTO: 0.7 %
LYMPHOCYTES # BLD AUTO: 2.56 X10(3)/MCL (ref 0.6–4.6)
LYMPHOCYTES NFR BLD AUTO: 27.9 %
MCH RBC QN AUTO: 26.7 PG (ref 27–31)
MCHC RBC AUTO-ENTMCNC: 31.2 G/DL (ref 33–36)
MCV RBC AUTO: 85.8 FL (ref 80–94)
MONOCYTES # BLD AUTO: 0.9 X10(3)/MCL (ref 0.1–1.3)
MONOCYTES NFR BLD AUTO: 9.8 %
NEUTROPHILS # BLD AUTO: 5.08 X10(3)/MCL (ref 2.1–9.2)
NEUTROPHILS NFR BLD AUTO: 55.1 %
NRBC BLD AUTO-RTO: 0 %
PLATELET # BLD AUTO: 311 X10(3)/MCL (ref 130–400)
PMV BLD AUTO: 10.8 FL (ref 7.4–10.4)
POTASSIUM SERPL-SCNC: 4.8 MMOL/L (ref 3.5–5.1)
PROT SERPL-MCNC: 7.3 GM/DL (ref 6.4–8.3)
RBC # BLD AUTO: 4.45 X10(6)/MCL (ref 4.2–5.4)
SODIUM SERPL-SCNC: 144 MMOL/L (ref 136–145)
WBC # SPEC AUTO: 9.19 X10(3)/MCL (ref 4.5–11.5)

## 2023-08-14 PROCEDURE — 85025 COMPLETE CBC W/AUTO DIFF WBC: CPT | Performed by: INTERNAL MEDICINE

## 2023-08-14 PROCEDURE — 80053 COMPREHEN METABOLIC PANEL: CPT | Performed by: INTERNAL MEDICINE

## 2023-10-06 ENCOUNTER — LAB REQUISITION (OUTPATIENT)
Dept: LAB | Facility: HOSPITAL | Age: 53
End: 2023-10-06
Payer: MEDICAID

## 2023-10-06 DIAGNOSIS — E03.9 HYPOTHYROIDISM, UNSPECIFIED: ICD-10-CM

## 2023-10-06 DIAGNOSIS — N18.9 CHRONIC KIDNEY DISEASE, UNSPECIFIED: ICD-10-CM

## 2023-10-06 DIAGNOSIS — D64.9 ANEMIA, UNSPECIFIED: ICD-10-CM

## 2023-10-06 DIAGNOSIS — E78.5 HYPERLIPIDEMIA, UNSPECIFIED: ICD-10-CM

## 2023-10-06 LAB
ALBUMIN SERPL-MCNC: 3.3 G/DL (ref 3.5–5)
ALBUMIN/GLOB SERPL: 0.9 RATIO (ref 1.1–2)
ALP SERPL-CCNC: 125 UNIT/L (ref 40–150)
ALT SERPL-CCNC: 15 UNIT/L (ref 0–55)
AST SERPL-CCNC: 22 UNIT/L (ref 5–34)
BASOPHILS # BLD AUTO: 0.08 X10(3)/MCL
BASOPHILS NFR BLD AUTO: 0.8 %
BILIRUB SERPL-MCNC: 0.2 MG/DL
BUN SERPL-MCNC: 24.1 MG/DL (ref 9.8–20.1)
CALCIUM SERPL-MCNC: 9.3 MG/DL (ref 8.4–10.2)
CHLORIDE SERPL-SCNC: 108 MMOL/L (ref 98–107)
CHOLEST SERPL-MCNC: 185 MG/DL
CHOLEST/HDLC SERPL: 5 {RATIO} (ref 0–5)
CO2 SERPL-SCNC: 23 MMOL/L (ref 22–29)
CREAT SERPL-MCNC: 1.08 MG/DL (ref 0.55–1.02)
EOSINOPHIL # BLD AUTO: 0.47 X10(3)/MCL (ref 0–0.9)
EOSINOPHIL NFR BLD AUTO: 4.6 %
ERYTHROCYTE [DISTWIDTH] IN BLOOD BY AUTOMATED COUNT: 17.6 % (ref 11.5–17)
GFR SERPLBLD CREATININE-BSD FMLA CKD-EPI: >60 MLS/MIN/1.73/M2
GLOBULIN SER-MCNC: 3.7 GM/DL (ref 2.4–3.5)
GLUCOSE SERPL-MCNC: 142 MG/DL (ref 74–100)
HCT VFR BLD AUTO: 37.8 % (ref 37–47)
HDLC SERPL-MCNC: 37 MG/DL (ref 35–60)
HGB BLD-MCNC: 11.7 G/DL (ref 12–16)
IMM GRANULOCYTES # BLD AUTO: 0.11 X10(3)/MCL (ref 0–0.04)
IMM GRANULOCYTES NFR BLD AUTO: 1.1 %
LDLC SERPL CALC-MCNC: 115 MG/DL (ref 50–140)
LYMPHOCYTES # BLD AUTO: 2.9 X10(3)/MCL (ref 0.6–4.6)
LYMPHOCYTES NFR BLD AUTO: 28.5 %
MCH RBC QN AUTO: 28.3 PG (ref 27–31)
MCHC RBC AUTO-ENTMCNC: 31 G/DL (ref 33–36)
MCV RBC AUTO: 91.5 FL (ref 80–94)
MONOCYTES # BLD AUTO: 1.19 X10(3)/MCL (ref 0.1–1.3)
MONOCYTES NFR BLD AUTO: 11.7 %
NEUTROPHILS # BLD AUTO: 5.44 X10(3)/MCL (ref 2.1–9.2)
NEUTROPHILS NFR BLD AUTO: 53.3 %
NRBC BLD AUTO-RTO: 0 %
PLATELET # BLD AUTO: 280 X10(3)/MCL (ref 130–400)
PMV BLD AUTO: 10.7 FL (ref 7.4–10.4)
POTASSIUM SERPL-SCNC: 4.9 MMOL/L (ref 3.5–5.1)
PROT SERPL-MCNC: 7 GM/DL (ref 6.4–8.3)
RBC # BLD AUTO: 4.13 X10(6)/MCL (ref 4.2–5.4)
SODIUM SERPL-SCNC: 145 MMOL/L (ref 136–145)
TRIGL SERPL-MCNC: 163 MG/DL (ref 37–140)
TSH SERPL-ACNC: 12.66 UIU/ML (ref 0.35–4.94)
VALPROATE SERPL-MCNC: 54.8 UG/ML (ref 50–100)
VLDLC SERPL CALC-MCNC: 33 MG/DL
WBC # SPEC AUTO: 10.19 X10(3)/MCL (ref 4.5–11.5)

## 2023-10-06 PROCEDURE — 80061 LIPID PANEL: CPT | Performed by: INTERNAL MEDICINE

## 2023-10-06 PROCEDURE — 80164 ASSAY DIPROPYLACETIC ACD TOT: CPT | Performed by: INTERNAL MEDICINE

## 2023-10-06 PROCEDURE — 85025 COMPLETE CBC W/AUTO DIFF WBC: CPT | Performed by: INTERNAL MEDICINE

## 2023-10-06 PROCEDURE — 80053 COMPREHEN METABOLIC PANEL: CPT | Performed by: INTERNAL MEDICINE

## 2023-10-06 PROCEDURE — 84443 ASSAY THYROID STIM HORMONE: CPT | Performed by: INTERNAL MEDICINE

## 2023-10-14 ENCOUNTER — LAB REQUISITION (OUTPATIENT)
Dept: LAB | Facility: HOSPITAL | Age: 53
End: 2023-10-14
Payer: MEDICAID

## 2023-10-14 DIAGNOSIS — R05.9 COUGH, UNSPECIFIED: ICD-10-CM

## 2023-10-14 LAB
ALBUMIN SERPL-MCNC: 3.5 G/DL (ref 3.5–5)
ALBUMIN/GLOB SERPL: 0.8 RATIO (ref 1.1–2)
ALP SERPL-CCNC: 101 UNIT/L (ref 40–150)
ALT SERPL-CCNC: 17 UNIT/L (ref 0–55)
AST SERPL-CCNC: 25 UNIT/L (ref 5–34)
BASOPHILS # BLD AUTO: 0.05 X10(3)/MCL
BASOPHILS NFR BLD AUTO: 0.5 %
BILIRUB SERPL-MCNC: <0.5 MG/DL
BUN SERPL-MCNC: 23.3 MG/DL (ref 9.8–20.1)
CALCIUM SERPL-MCNC: 9.6 MG/DL (ref 8.4–10.2)
CHLORIDE SERPL-SCNC: 105 MMOL/L (ref 98–107)
CO2 SERPL-SCNC: 20 MMOL/L (ref 22–29)
CREAT SERPL-MCNC: 1.15 MG/DL (ref 0.55–1.02)
EOSINOPHIL # BLD AUTO: 0.26 X10(3)/MCL (ref 0–0.9)
EOSINOPHIL NFR BLD AUTO: 2.7 %
ERYTHROCYTE [DISTWIDTH] IN BLOOD BY AUTOMATED COUNT: 17.4 % (ref 11.5–17)
GFR SERPLBLD CREATININE-BSD FMLA CKD-EPI: 56 MLS/MIN/1.73/M2
GLOBULIN SER-MCNC: 4.4 GM/DL (ref 2.4–3.5)
GLUCOSE SERPL-MCNC: 129 MG/DL (ref 74–100)
HCT VFR BLD AUTO: 39.8 % (ref 37–47)
HGB BLD-MCNC: 12.5 G/DL (ref 12–16)
IMM GRANULOCYTES # BLD AUTO: 0.11 X10(3)/MCL (ref 0–0.04)
IMM GRANULOCYTES NFR BLD AUTO: 1.1 %
LYMPHOCYTES # BLD AUTO: 1.26 X10(3)/MCL (ref 0.6–4.6)
LYMPHOCYTES NFR BLD AUTO: 13.1 %
MCH RBC QN AUTO: 28.2 PG (ref 27–31)
MCHC RBC AUTO-ENTMCNC: 31.4 G/DL (ref 33–36)
MCV RBC AUTO: 89.8 FL (ref 80–94)
MONOCYTES # BLD AUTO: 0.84 X10(3)/MCL (ref 0.1–1.3)
MONOCYTES NFR BLD AUTO: 8.8 %
NEUTROPHILS # BLD AUTO: 7.08 X10(3)/MCL (ref 2.1–9.2)
NEUTROPHILS NFR BLD AUTO: 73.8 %
NRBC BLD AUTO-RTO: 0 %
PLATELET # BLD AUTO: 237 X10(3)/MCL (ref 130–400)
PMV BLD AUTO: 10.8 FL (ref 7.4–10.4)
POTASSIUM SERPL-SCNC: 5 MMOL/L (ref 3.5–5.1)
PROT SERPL-MCNC: 7.9 GM/DL (ref 6.4–8.3)
RBC # BLD AUTO: 4.43 X10(6)/MCL (ref 4.2–5.4)
SODIUM SERPL-SCNC: 142 MMOL/L (ref 136–145)
WBC # SPEC AUTO: 9.6 X10(3)/MCL (ref 4.5–11.5)

## 2023-10-14 PROCEDURE — 80053 COMPREHEN METABOLIC PANEL: CPT | Performed by: INTERNAL MEDICINE

## 2023-10-14 PROCEDURE — 85025 COMPLETE CBC W/AUTO DIFF WBC: CPT | Performed by: INTERNAL MEDICINE

## 2023-11-17 ENCOUNTER — LAB REQUISITION (OUTPATIENT)
Dept: LAB | Facility: HOSPITAL | Age: 53
End: 2023-11-17
Payer: MEDICAID

## 2023-11-17 DIAGNOSIS — E03.9 HYPOTHYROIDISM, UNSPECIFIED: ICD-10-CM

## 2023-11-17 LAB — TSH SERPL-ACNC: 6.65 UIU/ML (ref 0.35–4.94)

## 2023-11-17 PROCEDURE — 84443 ASSAY THYROID STIM HORMONE: CPT | Performed by: INTERNAL MEDICINE

## 2023-12-29 ENCOUNTER — LAB REQUISITION (OUTPATIENT)
Dept: LAB | Facility: HOSPITAL | Age: 53
End: 2023-12-29
Payer: MEDICAID

## 2023-12-29 DIAGNOSIS — E03.9 HYPOTHYROIDISM, UNSPECIFIED: ICD-10-CM

## 2023-12-29 LAB — TSH SERPL-ACNC: 17.84 UIU/ML (ref 0.35–4.94)

## 2023-12-29 PROCEDURE — 84443 ASSAY THYROID STIM HORMONE: CPT | Performed by: INTERNAL MEDICINE

## 2024-01-03 ENCOUNTER — LAB REQUISITION (OUTPATIENT)
Dept: LAB | Facility: HOSPITAL | Age: 54
End: 2024-01-03
Payer: MEDICAID

## 2024-01-03 DIAGNOSIS — Z51.81 ENCOUNTER FOR THERAPEUTIC DRUG LEVEL MONITORING: ICD-10-CM

## 2024-01-03 LAB — VALPROATE SERPL-MCNC: 53.6 UG/ML (ref 50–100)

## 2024-01-03 PROCEDURE — 80164 ASSAY DIPROPYLACETIC ACD TOT: CPT | Performed by: INTERNAL MEDICINE

## 2024-01-05 ENCOUNTER — LAB REQUISITION (OUTPATIENT)
Dept: LAB | Facility: HOSPITAL | Age: 54
End: 2024-01-05
Payer: MEDICAID

## 2024-01-05 DIAGNOSIS — E03.9 HYPOTHYROIDISM, UNSPECIFIED: ICD-10-CM

## 2024-01-05 LAB — TSH SERPL-ACNC: 3.09 UIU/ML (ref 0.35–4.94)

## 2024-01-05 PROCEDURE — 84443 ASSAY THYROID STIM HORMONE: CPT | Performed by: INTERNAL MEDICINE

## 2024-01-10 PROCEDURE — 81001 URINALYSIS AUTO W/SCOPE: CPT | Performed by: INTERNAL MEDICINE

## 2024-01-11 ENCOUNTER — LAB REQUISITION (OUTPATIENT)
Dept: LAB | Facility: HOSPITAL | Age: 54
End: 2024-01-11
Payer: MEDICAID

## 2024-01-11 DIAGNOSIS — N18.9 CHRONIC KIDNEY DISEASE, UNSPECIFIED: ICD-10-CM

## 2024-01-11 DIAGNOSIS — D64.9 ANEMIA, UNSPECIFIED: ICD-10-CM

## 2024-01-11 DIAGNOSIS — N39.0 URINARY TRACT INFECTION, SITE NOT SPECIFIED: ICD-10-CM

## 2024-01-11 LAB
ALBUMIN SERPL-MCNC: 3.4 G/DL (ref 3.5–5)
ALBUMIN/GLOB SERPL: 1 RATIO (ref 1.1–2)
ALP SERPL-CCNC: 118 UNIT/L (ref 40–150)
ALT SERPL-CCNC: 11 UNIT/L (ref 0–55)
APPEARANCE UR: CLEAR
AST SERPL-CCNC: 12 UNIT/L (ref 5–34)
BACTERIA #/AREA URNS AUTO: ABNORMAL /HPF
BASOPHILS # BLD AUTO: 0.07 X10(3)/MCL
BASOPHILS NFR BLD AUTO: 0.8 %
BILIRUB SERPL-MCNC: 0.2 MG/DL
BILIRUB UR QL STRIP.AUTO: NEGATIVE
BUN SERPL-MCNC: 24.1 MG/DL (ref 9.8–20.1)
CALCIUM SERPL-MCNC: 9.4 MG/DL (ref 8.4–10.2)
CHLORIDE SERPL-SCNC: 107 MMOL/L (ref 98–107)
CO2 SERPL-SCNC: 25 MMOL/L (ref 22–29)
COLOR UR AUTO: ABNORMAL
CREAT SERPL-MCNC: 0.96 MG/DL (ref 0.55–1.02)
EOSINOPHIL # BLD AUTO: 0.33 X10(3)/MCL (ref 0–0.9)
EOSINOPHIL NFR BLD AUTO: 3.6 %
ERYTHROCYTE [DISTWIDTH] IN BLOOD BY AUTOMATED COUNT: 14.1 % (ref 11.5–17)
GFR SERPLBLD CREATININE-BSD FMLA CKD-EPI: >60 MLS/MIN/1.73/M2
GLOBULIN SER-MCNC: 3.3 GM/DL (ref 2.4–3.5)
GLUCOSE SERPL-MCNC: 168 MG/DL (ref 74–100)
GLUCOSE UR QL STRIP.AUTO: NEGATIVE
HCT VFR BLD AUTO: 33.6 % (ref 37–47)
HGB BLD-MCNC: 10.8 G/DL (ref 12–16)
IMM GRANULOCYTES # BLD AUTO: 0.13 X10(3)/MCL (ref 0–0.04)
IMM GRANULOCYTES NFR BLD AUTO: 1.4 %
KETONES UR QL STRIP.AUTO: ABNORMAL
LEUKOCYTE ESTERASE UR QL STRIP.AUTO: NEGATIVE
LYMPHOCYTES # BLD AUTO: 2.66 X10(3)/MCL (ref 0.6–4.6)
LYMPHOCYTES NFR BLD AUTO: 29 %
MCH RBC QN AUTO: 29.9 PG (ref 27–31)
MCHC RBC AUTO-ENTMCNC: 32.1 G/DL (ref 33–36)
MCV RBC AUTO: 93.1 FL (ref 80–94)
MONOCYTES # BLD AUTO: 1.09 X10(3)/MCL (ref 0.1–1.3)
MONOCYTES NFR BLD AUTO: 11.9 %
NEUTROPHILS # BLD AUTO: 4.9 X10(3)/MCL (ref 2.1–9.2)
NEUTROPHILS NFR BLD AUTO: 53.3 %
NITRITE UR QL STRIP.AUTO: NEGATIVE
NRBC BLD AUTO-RTO: 0 %
PH UR STRIP.AUTO: 6 [PH]
PLATELET # BLD AUTO: 260 X10(3)/MCL (ref 130–400)
PMV BLD AUTO: 10.9 FL (ref 7.4–10.4)
POTASSIUM SERPL-SCNC: 4.5 MMOL/L (ref 3.5–5.1)
PROT SERPL-MCNC: 6.7 GM/DL (ref 6.4–8.3)
PROT UR QL STRIP.AUTO: NEGATIVE
RBC # BLD AUTO: 3.61 X10(6)/MCL (ref 4.2–5.4)
RBC #/AREA URNS AUTO: ABNORMAL /HPF
RBC UR QL AUTO: ABNORMAL
SODIUM SERPL-SCNC: 143 MMOL/L (ref 136–145)
SP GR UR STRIP.AUTO: 1.02 (ref 1–1.03)
SQUAMOUS #/AREA URNS AUTO: ABNORMAL /HPF
UROBILINOGEN UR STRIP-ACNC: 0.2
VALPROATE SERPL-MCNC: 67.3 UG/ML (ref 50–100)
WBC # SPEC AUTO: 9.18 X10(3)/MCL (ref 4.5–11.5)
WBC #/AREA URNS AUTO: ABNORMAL /HPF

## 2024-01-11 PROCEDURE — 80053 COMPREHEN METABOLIC PANEL: CPT | Performed by: INTERNAL MEDICINE

## 2024-01-11 PROCEDURE — 85025 COMPLETE CBC W/AUTO DIFF WBC: CPT | Performed by: INTERNAL MEDICINE

## 2024-01-11 PROCEDURE — 80164 ASSAY DIPROPYLACETIC ACD TOT: CPT | Performed by: INTERNAL MEDICINE

## 2024-01-22 PROCEDURE — 87086 URINE CULTURE/COLONY COUNT: CPT | Performed by: INTERNAL MEDICINE

## 2024-01-22 PROCEDURE — 81003 URINALYSIS AUTO W/O SCOPE: CPT | Performed by: INTERNAL MEDICINE

## 2024-01-23 ENCOUNTER — LAB REQUISITION (OUTPATIENT)
Dept: LAB | Facility: HOSPITAL | Age: 54
End: 2024-01-23
Payer: MEDICAID

## 2024-01-23 DIAGNOSIS — N39.0 URINARY TRACT INFECTION, SITE NOT SPECIFIED: ICD-10-CM

## 2024-01-23 LAB
APPEARANCE UR: CLEAR
BILIRUB UR QL STRIP.AUTO: NEGATIVE
COLOR UR AUTO: ABNORMAL
GLUCOSE UR QL STRIP.AUTO: NEGATIVE
KETONES UR QL STRIP.AUTO: ABNORMAL
LEUKOCYTE ESTERASE UR QL STRIP.AUTO: NEGATIVE
NITRITE UR QL STRIP.AUTO: NEGATIVE
PH UR STRIP.AUTO: 6 [PH]
PROT UR QL STRIP.AUTO: NEGATIVE
RBC UR QL AUTO: NEGATIVE
SP GR UR STRIP.AUTO: 1.02 (ref 1–1.03)
UROBILINOGEN UR STRIP-ACNC: 0.2

## 2024-01-25 LAB — BACTERIA UR CULT: NORMAL

## 2024-03-07 ENCOUNTER — LAB REQUISITION (OUTPATIENT)
Dept: LAB | Facility: HOSPITAL | Age: 54
End: 2024-03-07
Payer: MEDICAID

## 2024-03-07 DIAGNOSIS — E03.9 HYPOTHYROIDISM, UNSPECIFIED: ICD-10-CM

## 2024-03-07 DIAGNOSIS — I10 ESSENTIAL (PRIMARY) HYPERTENSION: ICD-10-CM

## 2024-03-07 LAB
ALBUMIN SERPL-MCNC: 3.3 G/DL (ref 3.5–5)
ALBUMIN/GLOB SERPL: 1 RATIO (ref 1.1–2)
ALP SERPL-CCNC: 117 UNIT/L (ref 40–150)
ALT SERPL-CCNC: 14 UNIT/L (ref 0–55)
AST SERPL-CCNC: 11 UNIT/L (ref 5–34)
BASOPHILS # BLD AUTO: 0.07 X10(3)/MCL
BASOPHILS NFR BLD AUTO: 0.7 %
BILIRUB SERPL-MCNC: 0.2 MG/DL
BUN SERPL-MCNC: 21.6 MG/DL (ref 9.8–20.1)
CALCIUM SERPL-MCNC: 9.4 MG/DL (ref 8.4–10.2)
CHLORIDE SERPL-SCNC: 104 MMOL/L (ref 98–107)
CO2 SERPL-SCNC: 28 MMOL/L (ref 22–29)
CREAT SERPL-MCNC: 0.97 MG/DL (ref 0.55–1.02)
EOSINOPHIL # BLD AUTO: 0.35 X10(3)/MCL (ref 0–0.9)
EOSINOPHIL NFR BLD AUTO: 3.7 %
ERYTHROCYTE [DISTWIDTH] IN BLOOD BY AUTOMATED COUNT: 13.7 % (ref 11.5–17)
GFR SERPLBLD CREATININE-BSD FMLA CKD-EPI: >60 MLS/MIN/1.73/M2
GLOBULIN SER-MCNC: 3.4 GM/DL (ref 2.4–3.5)
GLUCOSE SERPL-MCNC: 140 MG/DL (ref 74–100)
HCT VFR BLD AUTO: 35.9 % (ref 37–47)
HGB BLD-MCNC: 11.5 G/DL (ref 12–16)
IMM GRANULOCYTES # BLD AUTO: 0.14 X10(3)/MCL (ref 0–0.04)
IMM GRANULOCYTES NFR BLD AUTO: 1.5 %
LYMPHOCYTES # BLD AUTO: 2.97 X10(3)/MCL (ref 0.6–4.6)
LYMPHOCYTES NFR BLD AUTO: 31.2 %
MCH RBC QN AUTO: 29.8 PG (ref 27–31)
MCHC RBC AUTO-ENTMCNC: 32 G/DL (ref 33–36)
MCV RBC AUTO: 93 FL (ref 80–94)
MONOCYTES # BLD AUTO: 1.01 X10(3)/MCL (ref 0.1–1.3)
MONOCYTES NFR BLD AUTO: 10.6 %
NEUTROPHILS # BLD AUTO: 4.97 X10(3)/MCL (ref 2.1–9.2)
NEUTROPHILS NFR BLD AUTO: 52.3 %
NRBC BLD AUTO-RTO: 0 %
PLATELET # BLD AUTO: 281 X10(3)/MCL (ref 130–400)
PMV BLD AUTO: 10.8 FL (ref 7.4–10.4)
POTASSIUM SERPL-SCNC: 4.5 MMOL/L (ref 3.5–5.1)
PROT SERPL-MCNC: 6.7 GM/DL (ref 6.4–8.3)
RBC # BLD AUTO: 3.86 X10(6)/MCL (ref 4.2–5.4)
SODIUM SERPL-SCNC: 142 MMOL/L (ref 136–145)
TSH SERPL-ACNC: 1.72 UIU/ML (ref 0.35–4.94)
WBC # SPEC AUTO: 9.51 X10(3)/MCL (ref 4.5–11.5)

## 2024-03-07 PROCEDURE — 80053 COMPREHEN METABOLIC PANEL: CPT | Performed by: INTERNAL MEDICINE

## 2024-03-07 PROCEDURE — 84443 ASSAY THYROID STIM HORMONE: CPT | Performed by: INTERNAL MEDICINE

## 2024-03-07 PROCEDURE — 85025 COMPLETE CBC W/AUTO DIFF WBC: CPT | Performed by: INTERNAL MEDICINE

## 2024-03-21 ENCOUNTER — LAB REQUISITION (OUTPATIENT)
Dept: LAB | Facility: HOSPITAL | Age: 54
End: 2024-03-21
Payer: MEDICAID

## 2024-03-21 DIAGNOSIS — D64.9 ANEMIA, UNSPECIFIED: ICD-10-CM

## 2024-03-21 LAB
ALBUMIN SERPL-MCNC: 3.3 G/DL (ref 3.5–5)
ALBUMIN/GLOB SERPL: 0.9 RATIO (ref 1.1–2)
ALP SERPL-CCNC: 113 UNIT/L (ref 40–150)
ALT SERPL-CCNC: 15 UNIT/L (ref 0–55)
AST SERPL-CCNC: 12 UNIT/L (ref 5–34)
BASOPHILS # BLD AUTO: 0.07 X10(3)/MCL
BASOPHILS NFR BLD AUTO: 0.8 %
BILIRUB SERPL-MCNC: 0.2 MG/DL
BUN SERPL-MCNC: 19.2 MG/DL (ref 9.8–20.1)
CALCIUM SERPL-MCNC: 9 MG/DL (ref 8.4–10.2)
CHLORIDE SERPL-SCNC: 106 MMOL/L (ref 98–107)
CO2 SERPL-SCNC: 24 MMOL/L (ref 22–29)
CREAT SERPL-MCNC: 1.16 MG/DL (ref 0.55–1.02)
EOSINOPHIL # BLD AUTO: 0.34 X10(3)/MCL (ref 0–0.9)
EOSINOPHIL NFR BLD AUTO: 3.8 %
ERYTHROCYTE [DISTWIDTH] IN BLOOD BY AUTOMATED COUNT: 13.7 % (ref 11.5–17)
GFR SERPLBLD CREATININE-BSD FMLA CKD-EPI: 56 MLS/MIN/1.73/M2
GLOBULIN SER-MCNC: 3.8 GM/DL (ref 2.4–3.5)
GLUCOSE SERPL-MCNC: 234 MG/DL (ref 74–100)
HCT VFR BLD AUTO: 36.5 % (ref 37–47)
HGB BLD-MCNC: 11.6 G/DL (ref 12–16)
IMM GRANULOCYTES # BLD AUTO: 0.13 X10(3)/MCL (ref 0–0.04)
IMM GRANULOCYTES NFR BLD AUTO: 1.4 %
LYMPHOCYTES # BLD AUTO: 2.35 X10(3)/MCL (ref 0.6–4.6)
LYMPHOCYTES NFR BLD AUTO: 25.9 %
MCH RBC QN AUTO: 29.8 PG (ref 27–31)
MCHC RBC AUTO-ENTMCNC: 31.8 G/DL (ref 33–36)
MCV RBC AUTO: 93.8 FL (ref 80–94)
MONOCYTES # BLD AUTO: 0.82 X10(3)/MCL (ref 0.1–1.3)
MONOCYTES NFR BLD AUTO: 9.1 %
NEUTROPHILS # BLD AUTO: 5.35 X10(3)/MCL (ref 2.1–9.2)
NEUTROPHILS NFR BLD AUTO: 59 %
NRBC BLD AUTO-RTO: 0 %
PLATELET # BLD AUTO: 253 X10(3)/MCL (ref 130–400)
PMV BLD AUTO: 10.9 FL (ref 7.4–10.4)
POTASSIUM SERPL-SCNC: 4.2 MMOL/L (ref 3.5–5.1)
PROT SERPL-MCNC: 7.1 GM/DL (ref 6.4–8.3)
RBC # BLD AUTO: 3.89 X10(6)/MCL (ref 4.2–5.4)
SODIUM SERPL-SCNC: 141 MMOL/L (ref 136–145)
WBC # SPEC AUTO: 9.06 X10(3)/MCL (ref 4.5–11.5)

## 2024-03-21 PROCEDURE — 85025 COMPLETE CBC W/AUTO DIFF WBC: CPT | Performed by: INTERNAL MEDICINE

## 2024-03-21 PROCEDURE — 80053 COMPREHEN METABOLIC PANEL: CPT | Performed by: INTERNAL MEDICINE

## 2024-03-25 ENCOUNTER — LAB REQUISITION (OUTPATIENT)
Dept: LAB | Facility: HOSPITAL | Age: 54
End: 2024-03-25
Payer: MEDICAID

## 2024-03-25 DIAGNOSIS — E11.9 TYPE 2 DIABETES MELLITUS WITHOUT COMPLICATIONS: ICD-10-CM

## 2024-03-25 LAB
EST. AVERAGE GLUCOSE BLD GHB EST-MCNC: 188.6 MG/DL
HBA1C MFR BLD: 8.2 %

## 2024-03-25 PROCEDURE — 83036 HEMOGLOBIN GLYCOSYLATED A1C: CPT | Performed by: INTERNAL MEDICINE

## 2024-04-05 ENCOUNTER — LAB REQUISITION (OUTPATIENT)
Dept: LAB | Facility: HOSPITAL | Age: 54
End: 2024-04-05
Payer: MEDICAID

## 2024-04-05 DIAGNOSIS — D64.9 ANEMIA, UNSPECIFIED: ICD-10-CM

## 2024-04-05 DIAGNOSIS — E78.5 HYPERLIPIDEMIA, UNSPECIFIED: ICD-10-CM

## 2024-04-05 DIAGNOSIS — N18.9 CHRONIC KIDNEY DISEASE, UNSPECIFIED: ICD-10-CM

## 2024-04-05 DIAGNOSIS — Z51.81 ENCOUNTER FOR THERAPEUTIC DRUG LEVEL MONITORING: ICD-10-CM

## 2024-04-05 LAB
ALBUMIN SERPL-MCNC: 3.3 G/DL (ref 3.5–5)
ALBUMIN/GLOB SERPL: 0.9 RATIO (ref 1.1–2)
ALP SERPL-CCNC: 99 UNIT/L (ref 40–150)
ALT SERPL-CCNC: 14 UNIT/L (ref 0–55)
AST SERPL-CCNC: 22 UNIT/L (ref 5–34)
BASOPHILS # BLD AUTO: 0.05 X10(3)/MCL
BASOPHILS NFR BLD AUTO: 0.6 %
BILIRUB SERPL-MCNC: 0.2 MG/DL
BUN SERPL-MCNC: 20.6 MG/DL (ref 9.8–20.1)
CALCIUM SERPL-MCNC: 9 MG/DL (ref 8.4–10.2)
CHLORIDE SERPL-SCNC: 107 MMOL/L (ref 98–107)
CHOLEST SERPL-MCNC: 168 MG/DL
CHOLEST/HDLC SERPL: 5 {RATIO} (ref 0–5)
CO2 SERPL-SCNC: 25 MMOL/L (ref 22–29)
CREAT SERPL-MCNC: 0.92 MG/DL (ref 0.55–1.02)
EOSINOPHIL # BLD AUTO: 0.26 X10(3)/MCL (ref 0–0.9)
EOSINOPHIL NFR BLD AUTO: 2.9 %
ERYTHROCYTE [DISTWIDTH] IN BLOOD BY AUTOMATED COUNT: 13.8 % (ref 11.5–17)
GFR SERPLBLD CREATININE-BSD FMLA CKD-EPI: >60 MLS/MIN/1.73/M2
GLOBULIN SER-MCNC: 3.6 GM/DL (ref 2.4–3.5)
GLUCOSE SERPL-MCNC: 92 MG/DL (ref 74–100)
HCT VFR BLD AUTO: 36.4 % (ref 37–47)
HDLC SERPL-MCNC: 33 MG/DL (ref 35–60)
HGB BLD-MCNC: 11.7 G/DL (ref 12–16)
IMM GRANULOCYTES # BLD AUTO: 0.13 X10(3)/MCL (ref 0–0.04)
IMM GRANULOCYTES NFR BLD AUTO: 1.4 %
LDLC SERPL CALC-MCNC: 99 MG/DL (ref 50–140)
LYMPHOCYTES # BLD AUTO: 2.82 X10(3)/MCL (ref 0.6–4.6)
LYMPHOCYTES NFR BLD AUTO: 31.3 %
MCH RBC QN AUTO: 30 PG (ref 27–31)
MCHC RBC AUTO-ENTMCNC: 32.1 G/DL (ref 33–36)
MCV RBC AUTO: 93.3 FL (ref 80–94)
MONOCYTES # BLD AUTO: 0.93 X10(3)/MCL (ref 0.1–1.3)
MONOCYTES NFR BLD AUTO: 10.3 %
NEUTROPHILS # BLD AUTO: 4.83 X10(3)/MCL (ref 2.1–9.2)
NEUTROPHILS NFR BLD AUTO: 53.5 %
NRBC BLD AUTO-RTO: 0 %
PLATELET # BLD AUTO: 238 X10(3)/MCL (ref 130–400)
PMV BLD AUTO: 11.1 FL (ref 7.4–10.4)
POTASSIUM SERPL-SCNC: 4.7 MMOL/L (ref 3.5–5.1)
PROT SERPL-MCNC: 6.9 GM/DL (ref 6.4–8.3)
RBC # BLD AUTO: 3.9 X10(6)/MCL (ref 4.2–5.4)
SODIUM SERPL-SCNC: 145 MMOL/L (ref 136–145)
TRIGL SERPL-MCNC: 178 MG/DL (ref 37–140)
VALPROATE SERPL-MCNC: 64.6 UG/ML (ref 50–100)
VLDLC SERPL CALC-MCNC: 36 MG/DL
WBC # SPEC AUTO: 9.02 X10(3)/MCL (ref 4.5–11.5)

## 2024-04-05 PROCEDURE — 80061 LIPID PANEL: CPT | Performed by: INTERNAL MEDICINE

## 2024-04-05 PROCEDURE — 85025 COMPLETE CBC W/AUTO DIFF WBC: CPT | Performed by: INTERNAL MEDICINE

## 2024-04-05 PROCEDURE — 80164 ASSAY DIPROPYLACETIC ACD TOT: CPT | Performed by: INTERNAL MEDICINE

## 2024-04-05 PROCEDURE — 80053 COMPREHEN METABOLIC PANEL: CPT | Performed by: INTERNAL MEDICINE

## 2024-06-13 ENCOUNTER — LAB REQUISITION (OUTPATIENT)
Dept: LAB | Facility: HOSPITAL | Age: 54
End: 2024-06-13
Payer: MEDICAID

## 2024-06-13 DIAGNOSIS — I10 ESSENTIAL (PRIMARY) HYPERTENSION: ICD-10-CM

## 2024-06-13 DIAGNOSIS — N20.1 CALCULUS OF URETER: ICD-10-CM

## 2024-06-13 LAB
ALBUMIN SERPL-MCNC: 3.3 G/DL (ref 3.5–5)
ALBUMIN/GLOB SERPL: 0.9 RATIO (ref 1.1–2)
ALP SERPL-CCNC: 89 UNIT/L (ref 40–150)
ALT SERPL-CCNC: 28 UNIT/L (ref 0–55)
ANION GAP SERPL CALC-SCNC: 14 MEQ/L
AST SERPL-CCNC: 26 UNIT/L (ref 5–34)
BASOPHILS # BLD AUTO: 0.06 X10(3)/MCL
BASOPHILS NFR BLD AUTO: 0.7 %
BILIRUB SERPL-MCNC: 0.3 MG/DL
BUN SERPL-MCNC: 21.4 MG/DL (ref 9.8–20.1)
CALCIUM SERPL-MCNC: 8.9 MG/DL (ref 8.4–10.2)
CHLORIDE SERPL-SCNC: 107 MMOL/L (ref 98–107)
CO2 SERPL-SCNC: 24 MMOL/L (ref 22–29)
CREAT SERPL-MCNC: 0.91 MG/DL (ref 0.55–1.02)
CREAT/UREA NIT SERPL: 24
EOSINOPHIL # BLD AUTO: 0.25 X10(3)/MCL (ref 0–0.9)
EOSINOPHIL NFR BLD AUTO: 3 %
ERYTHROCYTE [DISTWIDTH] IN BLOOD BY AUTOMATED COUNT: 14.9 % (ref 11.5–17)
GFR SERPLBLD CREATININE-BSD FMLA CKD-EPI: >60 ML/MIN/1.73/M2
GLOBULIN SER-MCNC: 3.5 GM/DL (ref 2.4–3.5)
GLUCOSE SERPL-MCNC: 107 MG/DL (ref 74–100)
HCT VFR BLD AUTO: 39.5 % (ref 37–47)
HGB BLD-MCNC: 12.2 G/DL (ref 12–16)
IMM GRANULOCYTES # BLD AUTO: 0.1 X10(3)/MCL (ref 0–0.04)
IMM GRANULOCYTES NFR BLD AUTO: 1.2 %
LYMPHOCYTES # BLD AUTO: 2.52 X10(3)/MCL (ref 0.6–4.6)
LYMPHOCYTES NFR BLD AUTO: 29.9 %
MCH RBC QN AUTO: 28.9 PG (ref 27–31)
MCHC RBC AUTO-ENTMCNC: 30.9 G/DL (ref 33–36)
MCV RBC AUTO: 93.6 FL (ref 80–94)
MONOCYTES # BLD AUTO: 0.91 X10(3)/MCL (ref 0.1–1.3)
MONOCYTES NFR BLD AUTO: 10.8 %
NEUTROPHILS # BLD AUTO: 4.6 X10(3)/MCL (ref 2.1–9.2)
NEUTROPHILS NFR BLD AUTO: 54.4 %
NRBC BLD AUTO-RTO: 0 %
PLATELET # BLD AUTO: 206 X10(3)/MCL (ref 130–400)
PMV BLD AUTO: 11.2 FL (ref 7.4–10.4)
POTASSIUM SERPL-SCNC: 4.2 MMOL/L (ref 3.5–5.1)
PROT SERPL-MCNC: 6.8 GM/DL (ref 6.4–8.3)
RBC # BLD AUTO: 4.22 X10(6)/MCL (ref 4.2–5.4)
SODIUM SERPL-SCNC: 145 MMOL/L (ref 136–145)
WBC # SPEC AUTO: 8.44 X10(3)/MCL (ref 4.5–11.5)

## 2024-06-13 PROCEDURE — 85025 COMPLETE CBC W/AUTO DIFF WBC: CPT | Performed by: INTERNAL MEDICINE

## 2024-06-13 PROCEDURE — 80053 COMPREHEN METABOLIC PANEL: CPT | Performed by: INTERNAL MEDICINE

## 2024-07-03 ENCOUNTER — LAB REQUISITION (OUTPATIENT)
Dept: LAB | Facility: HOSPITAL | Age: 54
End: 2024-07-03
Payer: MEDICAID

## 2024-07-03 DIAGNOSIS — Z51.81 ENCOUNTER FOR THERAPEUTIC DRUG LEVEL MONITORING: ICD-10-CM

## 2024-07-03 LAB — VALPROATE SERPL-MCNC: 55.1 UG/ML (ref 50–100)

## 2024-07-03 PROCEDURE — 80164 ASSAY DIPROPYLACETIC ACD TOT: CPT | Performed by: INTERNAL MEDICINE

## 2024-08-09 DIAGNOSIS — H40.9 GLAUCOMA, UNSPECIFIED GLAUCOMA TYPE, UNSPECIFIED LATERALITY: Primary | ICD-10-CM

## 2024-09-06 ENCOUNTER — LAB REQUISITION (OUTPATIENT)
Dept: LAB | Facility: HOSPITAL | Age: 54
End: 2024-09-06
Payer: MEDICAID

## 2024-09-06 DIAGNOSIS — I10 ESSENTIAL (PRIMARY) HYPERTENSION: ICD-10-CM

## 2024-09-06 LAB
EST. AVERAGE GLUCOSE BLD GHB EST-MCNC: 134.1 MG/DL
HBA1C MFR BLD: 6.3 %

## 2024-09-06 PROCEDURE — 83036 HEMOGLOBIN GLYCOSYLATED A1C: CPT | Performed by: INTERNAL MEDICINE

## 2024-10-04 ENCOUNTER — LAB REQUISITION (OUTPATIENT)
Dept: LAB | Facility: HOSPITAL | Age: 54
End: 2024-10-04
Payer: MEDICAID

## 2024-10-04 DIAGNOSIS — R56.9 UNSPECIFIED CONVULSIONS: ICD-10-CM

## 2024-10-04 LAB — VALPROATE SERPL-MCNC: 65.6 UG/ML (ref 50–100)

## 2024-10-04 PROCEDURE — 80164 ASSAY DIPROPYLACETIC ACD TOT: CPT | Performed by: INTERNAL MEDICINE

## 2025-01-06 ENCOUNTER — LAB REQUISITION (OUTPATIENT)
Dept: LAB | Facility: HOSPITAL | Age: 55
End: 2025-01-06
Payer: MEDICAID

## 2025-01-06 DIAGNOSIS — R56.9 UNSPECIFIED CONVULSIONS: ICD-10-CM

## 2025-01-06 LAB — VALPROATE SERPL-MCNC: 59.2 UG/ML (ref 50–100)

## 2025-01-06 PROCEDURE — 80164 ASSAY DIPROPYLACETIC ACD TOT: CPT | Performed by: INTERNAL MEDICINE

## 2025-02-14 ENCOUNTER — LAB REQUISITION (OUTPATIENT)
Dept: LAB | Facility: HOSPITAL | Age: 55
End: 2025-02-14
Payer: MEDICAID

## 2025-02-14 DIAGNOSIS — N18.1 CHRONIC KIDNEY DISEASE, STAGE 1: ICD-10-CM

## 2025-02-14 LAB
ALBUMIN SERPL-MCNC: 3.3 G/DL (ref 3.5–5)
ALBUMIN/GLOB SERPL: 0.9 RATIO (ref 1.1–2)
ALP SERPL-CCNC: 89 UNIT/L (ref 40–150)
ALT SERPL-CCNC: 18 UNIT/L (ref 0–55)
ANION GAP SERPL CALC-SCNC: 12 MEQ/L
AST SERPL-CCNC: 17 UNIT/L (ref 5–34)
BASOPHILS # BLD AUTO: 0.09 X10(3)/MCL
BASOPHILS NFR BLD AUTO: 0.9 %
BILIRUB SERPL-MCNC: 0.3 MG/DL
BUN SERPL-MCNC: 14.9 MG/DL (ref 9.8–20.1)
CALCIUM SERPL-MCNC: 8.8 MG/DL (ref 8.4–10.2)
CHLORIDE SERPL-SCNC: 106 MMOL/L (ref 98–107)
CO2 SERPL-SCNC: 25 MMOL/L (ref 22–29)
CREAT SERPL-MCNC: 0.87 MG/DL (ref 0.55–1.02)
CREAT/UREA NIT SERPL: 17
EOSINOPHIL # BLD AUTO: 0.65 X10(3)/MCL (ref 0–0.9)
EOSINOPHIL NFR BLD AUTO: 6.8 %
ERYTHROCYTE [DISTWIDTH] IN BLOOD BY AUTOMATED COUNT: 16 % (ref 11.5–17)
GFR SERPLBLD CREATININE-BSD FMLA CKD-EPI: >60 ML/MIN/1.73/M2
GLOBULIN SER-MCNC: 3.5 GM/DL (ref 2.4–3.5)
GLUCOSE SERPL-MCNC: 106 MG/DL (ref 74–100)
HCT VFR BLD AUTO: 40.9 % (ref 37–47)
HGB BLD-MCNC: 12.7 G/DL (ref 12–16)
IMM GRANULOCYTES # BLD AUTO: 0.08 X10(3)/MCL (ref 0–0.04)
IMM GRANULOCYTES NFR BLD AUTO: 0.8 %
LYMPHOCYTES # BLD AUTO: 3.2 X10(3)/MCL (ref 0.6–4.6)
LYMPHOCYTES NFR BLD AUTO: 33.3 %
MCH RBC QN AUTO: 27.9 PG (ref 27–31)
MCHC RBC AUTO-ENTMCNC: 31.1 G/DL (ref 33–36)
MCV RBC AUTO: 89.7 FL (ref 80–94)
MONOCYTES # BLD AUTO: 0.83 X10(3)/MCL (ref 0.1–1.3)
MONOCYTES NFR BLD AUTO: 8.6 %
NEUTROPHILS # BLD AUTO: 4.77 X10(3)/MCL (ref 2.1–9.2)
NEUTROPHILS NFR BLD AUTO: 49.6 %
NRBC BLD AUTO-RTO: 0 %
PLATELET # BLD AUTO: 226 X10(3)/MCL (ref 130–400)
PMV BLD AUTO: 10.6 FL (ref 7.4–10.4)
POTASSIUM SERPL-SCNC: 4.5 MMOL/L (ref 3.5–5.1)
PROT SERPL-MCNC: 6.8 GM/DL (ref 6.4–8.3)
RBC # BLD AUTO: 4.56 X10(6)/MCL (ref 4.2–5.4)
SODIUM SERPL-SCNC: 143 MMOL/L (ref 136–145)
TSH SERPL-ACNC: 6.42 UIU/ML (ref 0.35–4.94)
WBC # BLD AUTO: 9.62 X10(3)/MCL (ref 4.5–11.5)

## 2025-02-14 PROCEDURE — 84443 ASSAY THYROID STIM HORMONE: CPT | Performed by: INTERNAL MEDICINE

## 2025-02-14 PROCEDURE — 80053 COMPREHEN METABOLIC PANEL: CPT | Performed by: INTERNAL MEDICINE

## 2025-02-14 PROCEDURE — 85025 COMPLETE CBC W/AUTO DIFF WBC: CPT | Performed by: INTERNAL MEDICINE

## 2025-03-20 ENCOUNTER — LAB REQUISITION (OUTPATIENT)
Dept: LAB | Facility: HOSPITAL | Age: 55
End: 2025-03-20
Payer: MEDICAID

## 2025-03-20 DIAGNOSIS — N18.1 CHRONIC KIDNEY DISEASE, STAGE 1: ICD-10-CM

## 2025-03-20 LAB
ALBUMIN SERPL-MCNC: 3.6 G/DL (ref 3.5–5)
ALBUMIN/GLOB SERPL: 1 RATIO (ref 1.1–2)
ALP SERPL-CCNC: 105 UNIT/L (ref 40–150)
ALT SERPL-CCNC: 15 UNIT/L (ref 0–55)
ANION GAP SERPL CALC-SCNC: 15 MEQ/L
AST SERPL-CCNC: 14 UNIT/L (ref 5–34)
BASOPHILS # BLD AUTO: 0.07 X10(3)/MCL
BASOPHILS NFR BLD AUTO: 0.5 %
BILIRUB SERPL-MCNC: 0.3 MG/DL
BUN SERPL-MCNC: 14.1 MG/DL (ref 9.8–20.1)
CALCIUM SERPL-MCNC: 9.2 MG/DL (ref 8.4–10.2)
CHLORIDE SERPL-SCNC: 106 MMOL/L (ref 98–107)
CO2 SERPL-SCNC: 24 MMOL/L (ref 22–29)
CREAT SERPL-MCNC: 0.84 MG/DL (ref 0.55–1.02)
CREAT/UREA NIT SERPL: 17
EOSINOPHIL # BLD AUTO: 0.16 X10(3)/MCL (ref 0–0.9)
EOSINOPHIL NFR BLD AUTO: 1.2 %
ERYTHROCYTE [DISTWIDTH] IN BLOOD BY AUTOMATED COUNT: 16.2 % (ref 11.5–17)
GFR SERPLBLD CREATININE-BSD FMLA CKD-EPI: >60 ML/MIN/1.73/M2
GLOBULIN SER-MCNC: 3.6 GM/DL (ref 2.4–3.5)
GLUCOSE SERPL-MCNC: 92 MG/DL (ref 74–100)
HCT VFR BLD AUTO: 43.5 % (ref 37–47)
HGB BLD-MCNC: 13.8 G/DL (ref 12–16)
IMM GRANULOCYTES # BLD AUTO: 0.1 X10(3)/MCL (ref 0–0.04)
IMM GRANULOCYTES NFR BLD AUTO: 0.8 %
LYMPHOCYTES # BLD AUTO: 3.5 X10(3)/MCL (ref 0.6–4.6)
LYMPHOCYTES NFR BLD AUTO: 26.9 %
MCH RBC QN AUTO: 27.5 PG (ref 27–31)
MCHC RBC AUTO-ENTMCNC: 31.7 G/DL (ref 33–36)
MCV RBC AUTO: 86.7 FL (ref 80–94)
MONOCYTES # BLD AUTO: 1.1 X10(3)/MCL (ref 0.1–1.3)
MONOCYTES NFR BLD AUTO: 8.4 %
NEUTROPHILS # BLD AUTO: 8.09 X10(3)/MCL (ref 2.1–9.2)
NEUTROPHILS NFR BLD AUTO: 62.2 %
NRBC BLD AUTO-RTO: 0 %
PLATELET # BLD AUTO: 301 X10(3)/MCL (ref 130–400)
PMV BLD AUTO: 10.4 FL (ref 7.4–10.4)
POTASSIUM SERPL-SCNC: 4.1 MMOL/L (ref 3.5–5.1)
PROT SERPL-MCNC: 7.2 GM/DL (ref 6.4–8.3)
RBC # BLD AUTO: 5.02 X10(6)/MCL (ref 4.2–5.4)
SODIUM SERPL-SCNC: 145 MMOL/L (ref 136–145)
WBC # BLD AUTO: 13.02 X10(3)/MCL (ref 4.5–11.5)

## 2025-03-20 PROCEDURE — 85025 COMPLETE CBC W/AUTO DIFF WBC: CPT | Performed by: INTERNAL MEDICINE

## 2025-03-20 PROCEDURE — 80053 COMPREHEN METABOLIC PANEL: CPT | Performed by: INTERNAL MEDICINE

## 2025-03-24 ENCOUNTER — OFFICE VISIT (OUTPATIENT)
Dept: OPHTHALMOLOGY | Facility: CLINIC | Age: 55
End: 2025-03-24
Payer: MEDICAID

## 2025-03-24 VITALS — BODY MASS INDEX: 28.34 KG/M2 | HEIGHT: 66 IN | WEIGHT: 176.38 LBS

## 2025-03-24 DIAGNOSIS — E11.9 TYPE 2 DIABETES MELLITUS WITHOUT RETINOPATHY: ICD-10-CM

## 2025-03-24 DIAGNOSIS — H53.001 AMBLYOPIA, RIGHT EYE: ICD-10-CM

## 2025-03-24 DIAGNOSIS — H52.03 HYPEROPIA OF BOTH EYES: ICD-10-CM

## 2025-03-24 DIAGNOSIS — H25.13 AGE-RELATED NUCLEAR CATARACT OF BOTH EYES: Primary | ICD-10-CM

## 2025-03-24 PROCEDURE — 99213 OFFICE O/P EST LOW 20 MIN: CPT | Mod: PBBFAC,PN

## 2025-03-24 RX ORDER — METOPROLOL TARTRATE 25 MG/1
25 TABLET, FILM COATED ORAL 2 TIMES DAILY
COMMUNITY
Start: 2025-03-17

## 2025-03-24 RX ORDER — DIVALPROEX SODIUM 500 MG/1
500 TABLET, DELAYED RELEASE ORAL 2 TIMES DAILY
COMMUNITY
Start: 2025-03-17

## 2025-03-24 RX ORDER — OLANZAPINE 5 MG/1
TABLET ORAL
COMMUNITY
Start: 2024-12-04

## 2025-03-24 RX ORDER — OLOPATADINE HYDROCHLORIDE 1 MG/ML
SOLUTION/ DROPS OPHTHALMIC
COMMUNITY
Start: 2025-03-12

## 2025-03-24 RX ORDER — AMLODIPINE BESYLATE 10 MG/1
10 TABLET ORAL
COMMUNITY
Start: 2025-03-23

## 2025-03-24 RX ORDER — CYCLOSPORINE 0.5 MG/ML
EMULSION OPHTHALMIC
COMMUNITY
Start: 2025-03-07

## 2025-03-24 RX ORDER — PHENYLEPH/TROPICAMIDE IN WATER 2.5 %-1 %
1 DROPS OPHTHALMIC (EYE) ONCE
Status: COMPLETED | OUTPATIENT
Start: 2025-03-24 | End: 2025-03-24

## 2025-03-24 RX ADMIN — Medication 1 DROP: at 08:03

## 2025-03-24 NOTE — PROGRESS NOTES
Assessment /Plan     For exam results, see Encounter Report.    Glaucoma, unspecified glaucoma type, unspecified laterality  -     tropicamide /PHENYLephrine opthalmic solution 1 drop      Age-related nuclear sclerosis, both eyes  Hyperopia, both eyes  Amblyopia, right eye?  - Poor historian (hx of stroke)  - Subjective refraction and retinoscopy consistent for the left eye at around +1.00, no improvement in vision for the right eye, patient does report poor vision in that eye since childhood but not history of wearing glasses as a child or strab surgery, also never heard of a lazy eye - poor historian though   - No evidence of pathology in the right eye - unable to get testing (bedridden), trace pallor maybe of the optic nerve but appears similar to left eye - likely amblyopia if history is reliable   - Use +1.00 readers for distance, +2.50 readers for near  - Return to clinic if no improvement in vision/change in ability to perform daily activities    Hx of stroke   Unable to get VF   Limits exam as patient is bedridden, but no evidence of VF defects on CVF    Type 2 diabetes mellitus without retinopathy   - Last A1c 6.3 9/6/24 (down from 8.2% 3/25/24)  - Continue blood sugar/blood pressure control with PCP   - Annual DFE    RTC 1 year annual DFE

## 2025-04-04 ENCOUNTER — LAB REQUISITION (OUTPATIENT)
Dept: LAB | Facility: HOSPITAL | Age: 55
End: 2025-04-04
Payer: MEDICAID

## 2025-04-04 DIAGNOSIS — N18.1 CHRONIC KIDNEY DISEASE, STAGE 1: ICD-10-CM

## 2025-04-04 LAB
ALBUMIN SERPL-MCNC: 3.5 G/DL (ref 3.5–5)
ALBUMIN/GLOB SERPL: 1 RATIO (ref 1.1–2)
ALP SERPL-CCNC: 101 UNIT/L (ref 40–150)
ALT SERPL-CCNC: 13 UNIT/L (ref 0–55)
ANION GAP SERPL CALC-SCNC: 11 MEQ/L
AST SERPL-CCNC: 16 UNIT/L (ref 11–45)
BASOPHILS # BLD AUTO: 0.09 X10(3)/MCL
BASOPHILS NFR BLD AUTO: 0.7 %
BILIRUB SERPL-MCNC: 0.5 MG/DL
BUN SERPL-MCNC: 13.1 MG/DL (ref 9.8–20.1)
CALCIUM SERPL-MCNC: 9 MG/DL (ref 8.4–10.2)
CHLORIDE SERPL-SCNC: 105 MMOL/L (ref 98–107)
CHOLEST SERPL-MCNC: 158 MG/DL
CHOLEST/HDLC SERPL: 5 {RATIO} (ref 0–5)
CO2 SERPL-SCNC: 26 MMOL/L (ref 22–29)
CREAT SERPL-MCNC: 0.8 MG/DL (ref 0.55–1.02)
CREAT/UREA NIT SERPL: 16
EOSINOPHIL # BLD AUTO: 0.59 X10(3)/MCL (ref 0–0.9)
EOSINOPHIL NFR BLD AUTO: 4.8 %
ERYTHROCYTE [DISTWIDTH] IN BLOOD BY AUTOMATED COUNT: 16.5 % (ref 11.5–17)
GFR SERPLBLD CREATININE-BSD FMLA CKD-EPI: >60 ML/MIN/1.73/M2
GLOBULIN SER-MCNC: 3.6 GM/DL (ref 2.4–3.5)
GLUCOSE SERPL-MCNC: 101 MG/DL (ref 74–100)
HCT VFR BLD AUTO: 44.3 % (ref 37–47)
HDLC SERPL-MCNC: 33 MG/DL (ref 35–60)
HGB BLD-MCNC: 13.9 G/DL (ref 12–16)
IMM GRANULOCYTES # BLD AUTO: 0.13 X10(3)/MCL (ref 0–0.04)
IMM GRANULOCYTES NFR BLD AUTO: 1.1 %
LDLC SERPL CALC-MCNC: 94 MG/DL (ref 50–140)
LYMPHOCYTES # BLD AUTO: 3.25 X10(3)/MCL (ref 0.6–4.6)
LYMPHOCYTES NFR BLD AUTO: 26.7 %
MCH RBC QN AUTO: 28 PG (ref 27–31)
MCHC RBC AUTO-ENTMCNC: 31.4 G/DL (ref 33–36)
MCV RBC AUTO: 89.3 FL (ref 80–94)
MONOCYTES # BLD AUTO: 0.96 X10(3)/MCL (ref 0.1–1.3)
MONOCYTES NFR BLD AUTO: 7.9 %
NEUTROPHILS # BLD AUTO: 7.17 X10(3)/MCL (ref 2.1–9.2)
NEUTROPHILS NFR BLD AUTO: 58.8 %
NRBC BLD AUTO-RTO: 0 %
PLATELET # BLD AUTO: 262 X10(3)/MCL (ref 130–400)
PMV BLD AUTO: 10.7 FL (ref 7.4–10.4)
POTASSIUM SERPL-SCNC: 4.5 MMOL/L (ref 3.5–5.1)
PROT SERPL-MCNC: 7.1 GM/DL (ref 6.4–8.3)
RBC # BLD AUTO: 4.96 X10(6)/MCL (ref 4.2–5.4)
SODIUM SERPL-SCNC: 142 MMOL/L (ref 136–145)
TRIGL SERPL-MCNC: 154 MG/DL (ref 37–140)
TSH SERPL-ACNC: 20.47 UIU/ML (ref 0.35–4.94)
VALPROATE SERPL-MCNC: 68.1 UG/ML (ref 50–100)
VLDLC SERPL CALC-MCNC: 31 MG/DL
WBC # BLD AUTO: 12.19 X10(3)/MCL (ref 4.5–11.5)

## 2025-04-04 PROCEDURE — 85025 COMPLETE CBC W/AUTO DIFF WBC: CPT | Performed by: INTERNAL MEDICINE

## 2025-04-04 PROCEDURE — 80061 LIPID PANEL: CPT | Performed by: INTERNAL MEDICINE

## 2025-04-04 PROCEDURE — 80164 ASSAY DIPROPYLACETIC ACD TOT: CPT | Performed by: INTERNAL MEDICINE

## 2025-04-04 PROCEDURE — 80053 COMPREHEN METABOLIC PANEL: CPT | Performed by: INTERNAL MEDICINE

## 2025-04-04 PROCEDURE — 84443 ASSAY THYROID STIM HORMONE: CPT | Performed by: INTERNAL MEDICINE

## 2025-05-30 ENCOUNTER — LAB REQUISITION (OUTPATIENT)
Dept: LAB | Facility: HOSPITAL | Age: 55
End: 2025-05-30
Payer: MEDICAID

## 2025-05-30 DIAGNOSIS — E03.9 HYPOTHYROIDISM, UNSPECIFIED: ICD-10-CM

## 2025-05-30 LAB — TSH SERPL-ACNC: 0.37 UIU/ML (ref 0.35–4.94)

## 2025-05-30 PROCEDURE — 84443 ASSAY THYROID STIM HORMONE: CPT | Performed by: INTERNAL MEDICINE

## 2025-06-03 ENCOUNTER — LAB REQUISITION (OUTPATIENT)
Dept: LAB | Facility: HOSPITAL | Age: 55
End: 2025-06-03
Payer: MEDICAID

## 2025-06-03 DIAGNOSIS — E03.9 HYPOTHYROIDISM, UNSPECIFIED: ICD-10-CM

## 2025-06-03 LAB — TSH SERPL-ACNC: 0.37 UIU/ML (ref 0.35–4.94)

## 2025-06-03 PROCEDURE — 84443 ASSAY THYROID STIM HORMONE: CPT | Performed by: INTERNAL MEDICINE

## 2025-07-02 ENCOUNTER — LAB REQUISITION (OUTPATIENT)
Dept: LAB | Facility: HOSPITAL | Age: 55
End: 2025-07-02
Payer: MEDICAID

## 2025-07-02 DIAGNOSIS — R56.9 UNSPECIFIED CONVULSIONS: ICD-10-CM

## 2025-07-02 LAB — VALPROATE SERPL-MCNC: 40 UG/ML (ref 50–100)

## 2025-07-02 PROCEDURE — 80164 ASSAY DIPROPYLACETIC ACD TOT: CPT | Performed by: INTERNAL MEDICINE
